# Patient Record
Sex: FEMALE | Race: WHITE | NOT HISPANIC OR LATINO | Employment: OTHER | URBAN - METROPOLITAN AREA
[De-identification: names, ages, dates, MRNs, and addresses within clinical notes are randomized per-mention and may not be internally consistent; named-entity substitution may affect disease eponyms.]

---

## 2017-08-16 ENCOUNTER — TRANSCRIBE ORDERS (OUTPATIENT)
Dept: BONE DENSITY | Facility: HOSPITAL | Age: 72
End: 2017-08-16

## 2017-08-16 DIAGNOSIS — Z78.0 MENOPAUSE: Primary | ICD-10-CM

## 2017-09-12 ENCOUNTER — HOSPITAL ENCOUNTER (OUTPATIENT)
Dept: BONE DENSITY | Facility: HOSPITAL | Age: 72
Discharge: HOME | End: 2017-09-12
Admitting: INTERNAL MEDICINE

## 2017-09-12 DIAGNOSIS — Z78.0 MENOPAUSE: ICD-10-CM

## 2017-09-12 PROCEDURE — 77080 DXA BONE DENSITY AXIAL: CPT

## 2017-09-22 ENCOUNTER — OFFICE VISIT (OUTPATIENT)
Dept: PULMONOLOGY | Facility: CLINIC | Age: 72
End: 2017-09-22

## 2017-09-22 VITALS
HEIGHT: 65 IN | WEIGHT: 136 LBS | BODY MASS INDEX: 22.66 KG/M2 | RESPIRATION RATE: 16 BRPM | DIASTOLIC BLOOD PRESSURE: 68 MMHG | OXYGEN SATURATION: 95 % | HEART RATE: 74 BPM | TEMPERATURE: 97.2 F | SYSTOLIC BLOOD PRESSURE: 104 MMHG

## 2017-09-22 DIAGNOSIS — J98.4 RESTRICTIVE LUNG DISEASE: Primary | ICD-10-CM

## 2017-09-22 PROCEDURE — 94375 RESPIRATORY FLOW VOLUME LOOP: CPT | Performed by: INTERNAL MEDICINE

## 2017-09-22 PROCEDURE — 99204 OFFICE O/P NEW MOD 45 MIN: CPT | Performed by: INTERNAL MEDICINE

## 2017-09-22 PROCEDURE — 94729 DIFFUSING CAPACITY: CPT | Performed by: INTERNAL MEDICINE

## 2017-09-22 PROCEDURE — 94726 PLETHYSMOGRAPHY LUNG VOLUMES: CPT | Performed by: INTERNAL MEDICINE

## 2017-09-22 RX ORDER — ERGOCALCIFEROL 1.25 MG/1
50000 CAPSULE ORAL DAILY
COMMUNITY
End: 2017-10-17

## 2017-09-22 RX ORDER — MULTIVIT-MIN/IRON/FOLIC ACID/K 18-600-40
1000 CAPSULE ORAL DAILY
COMMUNITY

## 2017-09-22 RX ORDER — LAMOTRIGINE 100 MG/1
50 TABLET ORAL DAILY
Refills: 0 | COMMUNITY
Start: 2017-09-15 | End: 2018-05-04

## 2017-09-22 RX ORDER — PANTOPRAZOLE SODIUM 40 MG/1
1 TABLET, DELAYED RELEASE ORAL DAILY
Refills: 0 | COMMUNITY
Start: 2017-08-09 | End: 2018-06-28 | Stop reason: SDUPTHER

## 2017-09-22 RX ORDER — CHLORAL HYDRATE 500 MG
1000 CAPSULE ORAL
COMMUNITY
End: 2023-01-06

## 2017-09-22 RX ORDER — FLUOXETINE 10 MG/1
1 CAPSULE ORAL DAILY
Refills: 0 | COMMUNITY
Start: 2017-08-30 | End: 2017-09-22 | Stop reason: SDUPTHER

## 2017-09-22 RX ORDER — DESLORATADINE 5 MG/1
5 TABLET ORAL DAILY
COMMUNITY
End: 2017-10-17

## 2017-09-22 NOTE — PROGRESS NOTES
Initial Pulmonary Consult Note    Subjective   Reason for consultation: Shortness of Breath    Maura Barry is a 72 y.o. female is being seen for consultation today at the request of Dr. Aleida Stein MD    History of Present Illness  Ms. Barry is a 71yo F who is referred for shortness of breath and an abnormal chest xray. She notes that she began feeling unwell back in July. She had a chest xray performed and there were changes in the left lower lobe. She was treated with antibiotics and had a repeat chest xray which continued to show the same changes. She does not have a chronic cough and she is able to do most of her ADLS. She mainly experiencing shortness of breath with exertion. She is a never smoker. Her symptoms have been stable to slightly worse over time.     Active Ambulatory Problems     Diagnosis Date Noted   • No Active Ambulatory Problems     Resolved Ambulatory Problems     Diagnosis Date Noted   • No Resolved Ambulatory Problems     Past Medical History:   Diagnosis Date   • Depression    • Depression        Past Surgical History:   Procedure Laterality Date   • BLADDER SUSPENSION         Family History   Problem Relation Age of Onset   • Stroke Mother    • Alzheimer's disease Father    • Asthma Brother    • Heart failure Brother    • Breast cancer Neg Hx        Social History     Social History   • Marital status:      Spouse name: N/A   • Number of children: N/A   • Years of education: N/A     Occupational History   • Not on file.     Social History Main Topics   • Smoking status: Never Smoker   • Smokeless tobacco: Never Used   • Alcohol use No   • Drug use: No   • Sexual activity: Defer     Other Topics Concern   • Not on file     Social History Narrative       Allergies   Allergen Reactions   • Penicillins        Current Outpatient Prescriptions   Medication Sig Dispense Refill   • ALLERGY SERUM INJECTION Inject  under the skin 1 (One) Time Per Week.     • Ascorbic Acid (VITAMIN C)  "500 MG capsule Take 100 mg by mouth Daily.     • aspirin 81 MG tablet Take 81 mg by mouth Daily.     • Calcium-Magnesium (CALCIUM MAGNESIUM 750) 300-300 MG tablet Take 3 tablets by mouth Daily.     • desloratadine (CLARINEX) 5 MG tablet Take 5 mg by mouth Daily.     • estradiol (ESTRACE) 1 MG tablet Take 0.5 tablets by mouth 2 (two) times a day.     • FLUoxetine (PROZAC) 20 MG capsule Take 30 mg by mouth Daily.     • Glucosamine HCl 1000 MG tablet Take 2,000 mg by mouth Daily.     • lamoTRIgine (LaMICtal) 100 MG tablet 1 tablet Daily.  0   • magnesium oxide (MAGOX) 400 (241.3 Mg) MG tablet tablet Take 400 mg by mouth Daily.     • memantine (NAMENDA) 10 MG tablet Take 10 mg by mouth daily.     • montelukast (SINGULAIR) 10 MG tablet Take 10 mg by mouth every night.     • Omega-3 Fatty Acids (FISH OIL) 1000 MG capsule capsule Take 1,000 mg by mouth Daily With Breakfast.     • pantoprazole (PROTONIX) 40 MG EC tablet 1 tablet Daily.  0   • traZODone (DESYREL) 50 MG tablet Take 50 mg by mouth every night.     • vitamin D (ERGOCALCIFEROL) 07330 units capsule capsule Take 50,000 Units by mouth Daily.       No current facility-administered medications for this visit.        Review of Systems  All other systems were reviewed and are negative.  Exceptions are included in the HPI or as otherwise noted above.     Objective   Blood pressure 104/68, pulse 74, temperature 97.2 °F (36.2 °C), resp. rate 16, height 65\" (165.1 cm), weight 136 lb (61.7 kg), SpO2 95 %.  Physical Exam   Constitutional: She is oriented to person, place, and time. She appears well-developed and well-nourished. No distress.   HENT:   Head: Normocephalic and atraumatic.   Mouth/Throat: Oropharynx is clear and moist.   Eyes: Conjunctivae are normal. Pupils are equal, round, and reactive to light. No scleral icterus.   Neck: Normal range of motion. Neck supple. No tracheal deviation present. No thyromegaly present.   Cardiovascular: Normal rate, regular rhythm " and normal heart sounds.    Pulmonary/Chest: Effort normal. No respiratory distress. She has no wheezes. She has rales in the right lower field and the left lower field.   Abdominal: Soft. Bowel sounds are normal. There is no tenderness.   Musculoskeletal: Normal range of motion. She exhibits no edema.   Lymphadenopathy:     She has no cervical adenopathy.   Neurological: She is alert and oriented to person, place, and time. She exhibits normal muscle tone. Coordination normal.   Skin: Skin is warm and dry. No rash noted. No erythema.   Psychiatric: She has a normal mood and affect. Her speech is normal and behavior is normal. Judgment normal.   Nursing note and vitals reviewed.      PFTs:  Performed in clinic and personally reviewed.   There is no obstruction.   There is mild restriction.   DLCO is reduced.     Imaging:  Outside imaging personally reviewed. Chest xrays from June and August 2017 show increased reticulation at the bases bilaterally. This is worse on the left.     Assessment/Plan   Maura was seen today for shortness of breath.    Diagnoses and all orders for this visit:    Restrictive lung disease  -     CT Chest Hi Resolution; Future  -     Pulmonary Function Test        Discussion:  Ms. Barry is a 73yo F who is referred for abnormal chest xray.     1. Restrictive Lung Disease  - PFTs show mild restriction and low DLCO. This raises the concern for an interstitial lung disease but could also be secondary to kyphosis from aging.   - Check a high resolution CT scan of the chest to assess for interstitial lung disease. If there is an NSIP pattern, plan to check ILD labs. If there is a UIP pattern, will need to discuss starting one of the newer medications for IPF.   - If CT scan is negative, can continue to monitor PFTs.   - I will call Ms. Barry with the results of her CT scan. She will follow up in clinic after that.          I personally spent over half of a total 45 minutes face to face with the  patient in counseling and discussion and/or coordination of care as described above.

## 2017-09-27 ENCOUNTER — TRANSCRIBE ORDERS (OUTPATIENT)
Dept: ADMINISTRATIVE | Facility: HOSPITAL | Age: 72
End: 2017-09-27

## 2017-09-27 DIAGNOSIS — Z12.31 VISIT FOR SCREENING MAMMOGRAM: Primary | ICD-10-CM

## 2017-09-28 ENCOUNTER — HOSPITAL ENCOUNTER (OUTPATIENT)
Dept: CT IMAGING | Facility: HOSPITAL | Age: 72
Discharge: HOME | End: 2017-09-28
Attending: INTERNAL MEDICINE | Admitting: INTERNAL MEDICINE

## 2017-09-28 DIAGNOSIS — J98.4 RESTRICTIVE LUNG DISEASE: ICD-10-CM

## 2017-09-28 PROCEDURE — 71250 CT THORAX DX C-: CPT

## 2017-10-12 ENCOUNTER — TELEPHONE (OUTPATIENT)
Dept: PULMONOLOGY | Facility: CLINIC | Age: 72
End: 2017-10-12

## 2017-10-12 NOTE — TELEPHONE ENCOUNTER
Ms Barry called for her CT results, which I reviewed with her.     She also mentioned the possibility of having a sleep study performed. She is followed by a neurologist for memory problems and states that her neurologist recommended a sleep study. Her neurologist is located at [facility] and she would rather have the study performed at [facility]. I explained the need for documentation from a face-to-face visit in order for insurance to approve a study. She is agreeable and will come in to see me next week.

## 2017-10-17 ENCOUNTER — OFFICE VISIT (OUTPATIENT)
Dept: PULMONOLOGY | Facility: CLINIC | Age: 72
End: 2017-10-17

## 2017-10-17 VITALS
SYSTOLIC BLOOD PRESSURE: 100 MMHG | HEART RATE: 80 BPM | WEIGHT: 137.8 LBS | OXYGEN SATURATION: 94 % | HEIGHT: 65 IN | RESPIRATION RATE: 16 BRPM | TEMPERATURE: 96.9 F | DIASTOLIC BLOOD PRESSURE: 60 MMHG | BODY MASS INDEX: 22.96 KG/M2

## 2017-10-17 DIAGNOSIS — R29.818 SUSPECTED SLEEP APNEA: Primary | ICD-10-CM

## 2017-10-17 DIAGNOSIS — R41.3 MEMORY DIFFICULTIES: ICD-10-CM

## 2017-10-17 PROCEDURE — 99212 OFFICE O/P EST SF 10 MIN: CPT | Performed by: NURSE PRACTITIONER

## 2017-10-17 RX ORDER — INFLUENZA VACCINE, ADJUVANTED 15; 15; 15 UG/.5ML; UG/.5ML; UG/.5ML
INJECTION, SUSPENSION INTRAMUSCULAR ONCE
Refills: 0 | COMMUNITY
Start: 2017-09-22 | End: 2020-05-18

## 2017-10-17 RX ORDER — FLUTICASONE PROPIONATE 50 MCG
SPRAY, SUSPENSION (ML) NASAL DAILY
Refills: 1 | COMMUNITY
Start: 2017-07-26 | End: 2020-01-22 | Stop reason: SDUPTHER

## 2017-10-17 NOTE — PROGRESS NOTES
"[facility] Pulmonary Follow up    CHIEF COMPLAINT    Possible sleep apnea    HISTORY OF PRESENT ILLNESS    Maura Barry is a 72 y.o.female here today to discuss testing for sleep apnea    She was first evaluated in our office by Dr. Stevenson on 9/22/17 for shortness of breath and an abnormal chest x-ray.  She had a CT of the chest and contacted me for results.  During that call she mentioned the possibility of sleep apnea and returns today to discuss testing.    She has been seen by neurologist for almost 2 years for memory problems.  She states that her neurologist mentioned being tested for sleep apnea however he is located at [place] and she would like to have the study performed at [facility].      She reports shortness of breath and EXCESSIVE daytime sleepiness.  She denies having to nap but states that she does have to rest throughout the day.  She typically sleeps alone but traveled with friends earlier this year.  On 2 separate occasions she was told by 2 separate friends that she stopped breathing in her sleep.    She did have a sleep study performed about 10 years ago but by her report it was \"fine\".  She takes trazodone 25 mg daily at bedtime but states that she has been on this for about 5 years.      There is no problem list on file for this patient.      Allergies   Allergen Reactions   • Penicillins        Current Outpatient Prescriptions:   •  ALLERGY SERUM INJECTION, Inject  under the skin 1 (One) Time Per Week., Disp: , Rfl:   •  Ascorbic Acid (VITAMIN C) 500 MG capsule, Take 1,000 mg by mouth Daily., Disp: , Rfl:   •  aspirin 81 MG tablet, Take 81 mg by mouth Daily., Disp: , Rfl:   •  Calcium-Magnesium (CALCIUM MAGNESIUM 750) 300-300 MG tablet, Take 3 tablets by mouth Daily., Disp: , Rfl:   •  Cholecalciferol (VITAMIN D3) 5000 units capsule capsule, Take 5,000 Units by mouth Daily., Disp: , Rfl:   •  FLUAD 0.5 ML suspension prefilled syringe, 1 (One) Time., Disp: , Rfl: 0  •  FLUoxetine (PROZAC) 20 " "MG capsule, Take 30 mg by mouth Daily., Disp: , Rfl:   •  fluticasone (FLONASE) 50 MCG/ACT nasal spray, Daily., Disp: , Rfl: 1  •  Glucosamine HCl 1000 MG tablet, Take 2,000 mg by mouth Daily., Disp: , Rfl:   •  lamoTRIgine (LaMICtal) 100 MG tablet, 50 mg Daily., Disp: , Rfl: 0  •  magnesium oxide (MAGOX) 400 (241.3 Mg) MG tablet tablet, Take 400 mg by mouth Daily., Disp: , Rfl:   •  memantine (NAMENDA) 10 MG tablet, Take 10 mg by mouth 2 (Two) Times a Day., Disp: , Rfl:   •  montelukast (SINGULAIR) 10 MG tablet, Take 10 mg by mouth every night., Disp: , Rfl:   •  Omega-3 Fatty Acids (FISH OIL) 1000 MG capsule capsule, Take 1,000 mg by mouth Daily With Breakfast., Disp: , Rfl:   •  pantoprazole (PROTONIX) 40 MG EC tablet, 1 tablet Daily., Disp: , Rfl: 0  •  traZODone (DESYREL) 50 MG tablet, Take 25 mg by mouth Every Night., Disp: , Rfl:   MEDICATION LIST AND ALLERGIES REVIEWED.    Social History   Substance Use Topics   • Smoking status: Never Smoker   • Smokeless tobacco: Never Used   • Alcohol use No       FAMILY AND SOCIAL HISTORY REVIEWED.    Review of Systems   Constitutional: Negative for activity change and fatigue.   Respiratory: Positive for shortness of breath.    Cardiovascular: Negative for chest pain and palpitations.   Neurological:        Memory problems   .    /60  Pulse 80  Temp 96.9 °F (36.1 °C)  Resp 16  Ht 65\" (165.1 cm)  Wt 137 lb 12.8 oz (62.5 kg)  SpO2 94% Comment: RA  BMI 22.93 kg/m2    Physical Exam   Constitutional: She is oriented to person, place, and time. She appears well-developed. No distress.   HENT:   Head: Normocephalic and atraumatic.   Neck: Neck supple.   Pulmonary/Chest: Effort normal. No stridor. No respiratory distress.   Musculoskeletal: She exhibits no edema.   Neurological: She is alert and oriented to person, place, and time.   Skin: Skin is warm and dry.   Psychiatric: She has a normal mood and affect. Her behavior is normal.   Vitals " reviewed.        RESULTS      PROBLEM LIST    Problem List Items Addressed This Visit     None      Visit Diagnoses     Suspected sleep apnea    -  Primary    Relevant Orders    Polysomnography 4 or More Parameters    Memory difficulties        Relevant Orders    Polysomnography 4 or More Parameters            DISCUSSION    She does have some symptoms of sleep apnea including witnessed apneas, daytime somnolence, and memory problems.  We will set her up for a sleep study in the sleep lab.  She is agreeable to CPAP or BiPAP therapy if warranted.  I will follow-up with her after her sleep study.    Marleny Alvarez, REGGIE  10/17/23010:44 PM  Electronically signed     Please note that portions of this note were completed with a voice recognition program. Efforts were made to edit the dictations, but occasionally words are mistranscribed.      CC: Aleida Stein MD

## 2017-10-23 ENCOUNTER — HOSPITAL ENCOUNTER (OUTPATIENT)
Dept: SLEEP MEDICINE | Facility: HOSPITAL | Age: 72
Discharge: HOME | End: 2017-10-23
Admitting: NURSE PRACTITIONER

## 2017-10-23 VITALS
BODY MASS INDEX: 22.82 KG/M2 | WEIGHT: 137 LBS | HEIGHT: 65 IN | SYSTOLIC BLOOD PRESSURE: 118 MMHG | RESPIRATION RATE: 16 BRPM | DIASTOLIC BLOOD PRESSURE: 55 MMHG

## 2017-10-23 DIAGNOSIS — R29.818 SUSPECTED SLEEP APNEA: ICD-10-CM

## 2017-10-23 DIAGNOSIS — R41.3 MEMORY DIFFICULTIES: ICD-10-CM

## 2017-10-23 PROCEDURE — 95800 SLP STDY UNATTENDED: CPT

## 2017-10-23 PROCEDURE — 95800 SLP STDY UNATTENDED: CPT | Performed by: INTERNAL MEDICINE

## 2017-11-13 ENCOUNTER — HOSPITAL ENCOUNTER (OUTPATIENT)
Dept: MAMMOGRAPHY | Facility: HOSPITAL | Age: 72
Discharge: HOME | End: 2017-11-13
Attending: OBSTETRICS & GYNECOLOGY | Admitting: OBSTETRICS & GYNECOLOGY

## 2017-11-13 DIAGNOSIS — Z12.31 VISIT FOR SCREENING MAMMOGRAM: ICD-10-CM

## 2017-11-13 PROCEDURE — 77063 BREAST TOMOSYNTHESIS BI: CPT

## 2017-11-13 PROCEDURE — G0202 SCR MAMMO BI INCL CAD: HCPCS | Performed by: RADIOLOGY

## 2017-11-13 PROCEDURE — 77063 BREAST TOMOSYNTHESIS BI: CPT | Performed by: RADIOLOGY

## 2017-11-13 PROCEDURE — G0202 SCR MAMMO BI INCL CAD: HCPCS

## 2018-01-08 ENCOUNTER — OFFICE VISIT (OUTPATIENT)
Dept: PULMONOLOGY | Facility: CLINIC | Age: 73
End: 2018-01-08

## 2018-01-08 VITALS
HEIGHT: 65 IN | OXYGEN SATURATION: 94 % | SYSTOLIC BLOOD PRESSURE: 120 MMHG | DIASTOLIC BLOOD PRESSURE: 72 MMHG | BODY MASS INDEX: 22.99 KG/M2 | TEMPERATURE: 98.7 F | WEIGHT: 138 LBS | HEART RATE: 84 BPM

## 2018-01-08 DIAGNOSIS — Z99.89 OSA ON CPAP: Primary | ICD-10-CM

## 2018-01-08 DIAGNOSIS — G47.33 OSA ON CPAP: Primary | ICD-10-CM

## 2018-01-08 PROCEDURE — 99213 OFFICE O/P EST LOW 20 MIN: CPT | Performed by: NURSE PRACTITIONER

## 2018-01-08 RX ORDER — RANITIDINE 300 MG/1
TABLET ORAL
Refills: 0 | COMMUNITY
Start: 2017-11-13 | End: 2018-07-26 | Stop reason: SDUPTHER

## 2018-01-08 NOTE — PROGRESS NOTES
[facility] Pulmonary Follow up    CHIEF COMPLAINT    KRISTY    HISTORY OF PRESENT ILLNESS    Maura Barry is a 72 y.o.female here today for follow up on her sleep apnea.    She was initially seen by Dr Stevenson for evaluation of an abnormal chest xray as well as some dyspnea on exertion. She had a HRCT that showed limited basilar bronchiectasis. When she contacted me for her CT results, she mentioned the possibility of a sleep study as this had been suggested to her by her neurologist for numerous symptoms of sleep apnea.    She had a home sleep study on 10/27/17 which revealed an AHI of 7.2. Auto CPAP 8-18 cm was initiated. She tries to wear it each night for at least 7 hours. She has downloaded the accompanying amandeep for her CPAP to her phone and was quite excited to show me all of her numbers. I did not see an overall AHI for the last 30 days but it appears her nightly AHI is 4 or less. She has noticed an improvement with more energy and less fatigue. She still has some shortness of breath when walking up hill and has not noticed an improvement in her memory problems.     She does have an upcoming trip to [facility] planned. She does not intend to take her CPAP but is concerned about being reported non-compliant to her insurance company during her 2 week trip.         Patient Active Problem List   Diagnosis   • KRISTY on CPAP       Allergies   Allergen Reactions   • Penicillins Hives       Current Outpatient Prescriptions:   •  ALLERGY SERUM INJECTION, Inject  under the skin 1 (One) Time Per Week., Disp: , Rfl:   •  Ascorbic Acid (VITAMIN C) 500 MG capsule, Take 1,000 mg by mouth Daily., Disp: , Rfl:   •  aspirin 81 MG tablet, Take 81 mg by mouth Daily., Disp: , Rfl:   •  Calcium-Magnesium (CALCIUM MAGNESIUM 750) 300-300 MG tablet, Take 3 tablets by mouth Daily., Disp: , Rfl:   •  Cholecalciferol (VITAMIN D3) 5000 units capsule capsule, Take 5,000 Units by mouth Daily., Disp: , Rfl:   •  FLUAD 0.5 ML suspension prefilled  "syringe, 1 (One) Time., Disp: , Rfl: 0  •  FLUoxetine (PROZAC) 20 MG capsule, Take 40 mg by mouth Daily., Disp: , Rfl:   •  fluticasone (FLONASE) 50 MCG/ACT nasal spray, Daily., Disp: , Rfl: 1  •  Glucosamine HCl 1000 MG tablet, Take 2,000 mg by mouth Daily., Disp: , Rfl:   •  lamoTRIgine (LaMICtal) 100 MG tablet, 50 mg Daily., Disp: , Rfl: 0  •  magnesium oxide (MAGOX) 400 (241.3 Mg) MG tablet tablet, Take 400 mg by mouth Daily., Disp: , Rfl:   •  memantine (NAMENDA) 10 MG tablet, Take 10 mg by mouth 2 (Two) Times a Day., Disp: , Rfl:   •  montelukast (SINGULAIR) 10 MG tablet, Take 10 mg by mouth every night., Disp: , Rfl:   •  Omega-3 Fatty Acids (FISH OIL) 1000 MG capsule capsule, Take 1,000 mg by mouth Daily With Breakfast., Disp: , Rfl:   •  pantoprazole (PROTONIX) 40 MG EC tablet, 1 tablet Daily., Disp: , Rfl: 0  •  raNITIdine (ZANTAC) 300 MG tablet, , Disp: , Rfl: 0  •  traZODone (DESYREL) 50 MG tablet, Take 25 mg by mouth Every Night., Disp: , Rfl:   MEDICATION LIST AND ALLERGIES REVIEWED.    Social History   Substance Use Topics   • Smoking status: Never Smoker   • Smokeless tobacco: Never Used   • Alcohol use No       FAMILY AND SOCIAL HISTORY REVIEWED.    Review of Systems   Constitutional: Negative for activity change and fatigue.   Respiratory: Positive for shortness of breath (on exertion).    Cardiovascular: Negative for chest pain and palpitations.   Neurological:        Memory problems   .    /72 (BP Location: Right arm, Patient Position: Sitting, Cuff Size: Adult)  Pulse 84  Temp 98.7 °F (37.1 °C)  Ht 165.1 cm (65\")  Wt 62.6 kg (138 lb)  SpO2 94%  BMI 22.96 kg/m2    Physical Exam   Constitutional: She is oriented to person, place, and time. She appears well-developed. No distress.   HENT:   Head: Normocephalic and atraumatic.   Neck: Neck supple.   Pulmonary/Chest: Effort normal. No stridor. No respiratory distress.   Musculoskeletal: She exhibits no edema.   Neurological: She is alert " and oriented to person, place, and time.   Skin: Skin is warm and dry.   Psychiatric: She has a normal mood and affect. Her behavior is normal.   Vitals reviewed.        RESULTS      Ct Chest Hi Resolution    Result Date: 9/28/2017  1.  Limited basilar bronchiectasis is noted. Trace scarring of the posterior inferior perimeter interstitium is noted. 2.  Otherwise, there is no groundglass disease, advanced parenchymal degenerative disease, bullous disease or pleural disease. Secondary perimeter, secondary pulmonary lobular septa are normal. Therefore, advanced fibrotic lung disease or significant interstitial global abnormalities are not identified.  D:  09/28/2017 E:  09/28/2017  This report was finalized on 9/28/2017 4:22 PM by Dr. Jairo Alvarez MD.        PROBLEM LIST    Problem List Items Addressed This Visit        Respiratory    KRISTY on CPAP - Primary    Overview     Home sleep study from 10/27/17 reveals mild KRISTY with AHI 7.2                 DISCUSSION    She has been quite compliant with her CPAP with a noticeable difference in her energy levels. We'll renew her CPAP supplies for 1 year.    I've recommended that she contact her insurance company regarding her upcoming trip to [facility] and ask about their compliance policy. Perhaps if she notifies them ahead of time, it will not be an issue.     She'll return to the office in 1 year or sooner if needed.     I spent 15 minutes with the patient. I spent > 50% percent of this time counseling and discussing diagnosis, current status and treatment options.    REGGIE Crocker  01/08/20181:58 PM  Electronically signed     Please note that portions of this note were completed with a voice recognition program. Efforts were made to edit the dictations, but occasionally words are mistranscribed.      CC: Aleida Stein MD

## 2018-05-04 ENCOUNTER — OFFICE VISIT (OUTPATIENT)
Dept: INTERNAL MEDICINE | Facility: CLINIC | Age: 73
End: 2018-05-04

## 2018-05-04 VITALS
HEIGHT: 65 IN | OXYGEN SATURATION: 95 % | SYSTOLIC BLOOD PRESSURE: 98 MMHG | HEART RATE: 86 BPM | WEIGHT: 126 LBS | DIASTOLIC BLOOD PRESSURE: 60 MMHG | BODY MASS INDEX: 20.99 KG/M2

## 2018-05-04 DIAGNOSIS — D64.9 ANEMIA, UNSPECIFIED TYPE: ICD-10-CM

## 2018-05-04 DIAGNOSIS — J30.89 ALLERGIC RHINITIS DUE TO OTHER ALLERGIC TRIGGER, UNSPECIFIED CHRONICITY, UNSPECIFIED SEASONALITY: ICD-10-CM

## 2018-05-04 DIAGNOSIS — F34.9 PERSISTENT MOOD DISORDER (HCC): ICD-10-CM

## 2018-05-04 DIAGNOSIS — R42 DIZZINESS: Primary | ICD-10-CM

## 2018-05-04 PROCEDURE — 99203 OFFICE O/P NEW LOW 30 MIN: CPT | Performed by: INTERNAL MEDICINE

## 2018-05-04 NOTE — PROGRESS NOTES
Establish Care; Fatigue (Onset for many years ); and Nausea (Started after she came back from [place]. Pt has felt loss of appetitte because of the nausea.)    Subjective   Maura Barry is a 72 y.o. female is here today for follow-up.    History of Present Illness   Kunal and Shesenthil's friend. Pt. Is here to establish, and has been with Dr. Stein here and at [facility], but this office is more convenient.Reports she is light headed , dizzy , nauseous.  Also has palps and weakness, and  Feels like she needs to sleep all the time.  Depression is getting worse, and is followed by [facility], and is having issues with being jittery.  Had labs when she got back from [place], and was having diarrhea, and had labs and meds , resolved now.  SHe reports was healthy , until she was taken off her estrogen, 10 yrs ago, getting and was taken off again 2 yrs ago.      Current Outpatient Prescriptions:   •  ALLERGY SERUM INJECTION, Inject  under the skin 1 (One) Time Per Week., Disp: , Rfl:   •  Ascorbic Acid (VITAMIN C) 500 MG capsule, Take 1,000 mg by mouth Daily., Disp: , Rfl:   •  aspirin 81 MG tablet, Take 81 mg by mouth Daily., Disp: , Rfl:   •  Calcium-Magnesium (CALCIUM MAGNESIUM 750) 300-300 MG tablet, Take 3 tablets by mouth Daily., Disp: , Rfl:   •  Cholecalciferol (VITAMIN D3) 5000 units capsule capsule, Take 5,000 Units by mouth Daily., Disp: , Rfl:   •  FLUAD 0.5 ML suspension prefilled syringe, 1 (One) Time., Disp: , Rfl: 0  •  FLUoxetine (PROZAC) 20 MG capsule, Take 40 mg by mouth Daily., Disp: , Rfl:   •  fluticasone (FLONASE) 50 MCG/ACT nasal spray, Daily., Disp: , Rfl: 1  •  Glucosamine HCl 1000 MG tablet, Take 2,000 mg by mouth Daily., Disp: , Rfl:   •  magnesium oxide (MAGOX) 400 (241.3 Mg) MG tablet tablet, Take 400 mg by mouth Daily., Disp: , Rfl:   •  memantine (NAMENDA) 10 MG tablet, Take 10 mg by mouth 2 (Two) Times a Day., Disp: , Rfl:   •  Omega-3 Fatty Acids (FISH OIL) 1000 MG capsule capsule, Take  "1,000 mg by mouth Daily With Breakfast., Disp: , Rfl:   •  pantoprazole (PROTONIX) 40 MG EC tablet, 1 tablet Daily., Disp: , Rfl: 0  •  raNITIdine (ZANTAC) 300 MG tablet, , Disp: , Rfl: 0  •  traZODone (DESYREL) 50 MG tablet, Take 25 mg by mouth Every Night., Disp: , Rfl:       The following portions of the patient's history were reviewed and updated as appropriate: allergies, current medications, past family history, past medical history, past social history, past surgical history and problem list.    Review of Systems   Constitutional: Negative for chills and fever.   HENT: Negative for ear discharge, ear pain, sinus pressure and sore throat.    Respiratory: Negative for cough, chest tightness and shortness of breath.    Cardiovascular: Negative for chest pain, palpitations and leg swelling.   Gastrointestinal: Positive for nausea. Negative for diarrhea and vomiting.   Musculoskeletal: Negative for arthralgias, back pain and myalgias.   Neurological: Positive for dizziness, weakness and light-headedness. Negative for syncope and headaches.   Psychiatric/Behavioral: Positive for sleep disturbance. Negative for confusion.       Objective   BP 98/60   Pulse 86   Ht 165.1 cm (65\")   Wt 57.2 kg (126 lb)   SpO2 95%   BMI 20.97 kg/m²   Physical Exam   Constitutional: She is oriented to person, place, and time. She appears well-developed and well-nourished.   HENT:   Head: Normocephalic and atraumatic.   Right Ear: Tympanic membrane is bulging.   Left Ear: Tympanic membrane is bulging.   Mouth/Throat: Posterior oropharyngeal erythema present. No oropharyngeal exudate or tonsillar abscesses.   Eyes: Conjunctivae are normal. Pupils are equal, round, and reactive to light.   Neck: Normal range of motion. Neck supple. No thyromegaly present.   Cardiovascular: Normal rate and regular rhythm.    Pulmonary/Chest: Effort normal and breath sounds normal. No stridor.   Abdominal: Soft. Bowel sounds are normal. She exhibits no " distension. There is no tenderness.   Musculoskeletal: She exhibits no edema.   Lymphadenopathy:     She has no cervical adenopathy.   Neurological: She is alert and oriented to person, place, and time. No cranial nerve deficit.   Skin: Skin is warm and dry.   Psychiatric: Judgment normal. Her mood appears anxious. She exhibits a depressed mood.   Nursing note and vitals reviewed.        No results found for this or any previous visit.          Assessment/Plan   Diagnoses and all orders for this visit:    Dizziness  -     CBC & Differential  -     Comprehensive Metabolic Panel  -     TSH  -     Vitamin B12  -     KEEGAN With / DsDNA, RNP, Sjogrens A / B, Crump    Allergic rhinitis due to other allergic trigger, unspecified chronicity, unspecified seasonality    Anemia, unspecified type  -     CBC & Differential  -     KEEGAN With / DsDNA, RNP, Sjogrens A / B, Smith  -     Nuclear Antigen Antibody, IFA  -     Ferritin  -     Iron Profile    Persistent mood disorder   -     TSH             Adv. To hold off on going back on Estrogen for now. She has been able to come off it twice. Would try other modalities first.    Return in about 6 weeks (around 6/15/2018) for Next scheduled follow up.

## 2018-05-07 LAB
ALBUMIN SERPL-MCNC: 4.3 G/DL (ref 3.2–4.8)
ALBUMIN/GLOB SERPL: 1.7 G/DL (ref 1.5–2.5)
ALP SERPL-CCNC: 130 U/L (ref 25–100)
ALT SERPL-CCNC: 29 U/L (ref 7–40)
ANA SER QL: NEGATIVE
ANA TITR SER IF: NEGATIVE {TITER}
AST SERPL-CCNC: 30 U/L (ref 0–33)
BASOPHILS # BLD AUTO: 0.06 10*3/MM3 (ref 0–0.2)
BASOPHILS NFR BLD AUTO: 0.6 % (ref 0–1)
BILIRUB SERPL-MCNC: 0.3 MG/DL (ref 0.3–1.2)
BUN SERPL-MCNC: 20 MG/DL (ref 9–23)
BUN/CREAT SERPL: 25 (ref 7–25)
CALCIUM SERPL-MCNC: 9.6 MG/DL (ref 8.7–10.4)
CHLORIDE SERPL-SCNC: 106 MMOL/L (ref 99–109)
CO2 SERPL-SCNC: 33 MMOL/L (ref 20–31)
CREAT SERPL-MCNC: 0.8 MG/DL (ref 0.6–1.3)
EOSINOPHIL # BLD AUTO: 0.58 10*3/MM3 (ref 0–0.3)
EOSINOPHIL NFR BLD AUTO: 5.9 % (ref 0–3)
ERYTHROCYTE [DISTWIDTH] IN BLOOD BY AUTOMATED COUNT: 14.7 % (ref 11.3–14.5)
FERRITIN SERPL-MCNC: 31 NG/ML (ref 10–291)
GFR SERPLBLD CREATININE-BSD FMLA CKD-EPI: 71 ML/MIN/1.73
GFR SERPLBLD CREATININE-BSD FMLA CKD-EPI: 86 ML/MIN/1.73
GLOBULIN SER CALC-MCNC: 2.5 GM/DL
GLUCOSE SERPL-MCNC: 82 MG/DL (ref 70–100)
HCT VFR BLD AUTO: 43.7 % (ref 34.5–44)
HGB BLD-MCNC: 14 G/DL (ref 11.5–15.5)
IMM GRANULOCYTES # BLD: 0.02 10*3/MM3 (ref 0–0.03)
IMM GRANULOCYTES NFR BLD: 0.2 % (ref 0–0.6)
IRON SATN MFR SERPL: 18 % (ref 15–50)
IRON SERPL-MCNC: 60 MCG/DL (ref 50–175)
LYMPHOCYTES # BLD AUTO: 1.47 10*3/MM3 (ref 0.6–4.8)
LYMPHOCYTES NFR BLD AUTO: 15 % (ref 24–44)
MCH RBC QN AUTO: 31.3 PG (ref 27–31)
MCHC RBC AUTO-ENTMCNC: 32 G/DL (ref 32–36)
MCV RBC AUTO: 97.5 FL (ref 80–99)
MONOCYTES # BLD AUTO: 0.75 10*3/MM3 (ref 0–1)
MONOCYTES NFR BLD AUTO: 7.7 % (ref 0–12)
NEUTROPHILS # BLD AUTO: 6.92 10*3/MM3 (ref 1.5–8.3)
NEUTROPHILS NFR BLD AUTO: 70.6 % (ref 41–71)
PLATELET # BLD AUTO: 337 10*3/MM3 (ref 150–450)
POTASSIUM SERPL-SCNC: 4.8 MMOL/L (ref 3.5–5.5)
PROT SERPL-MCNC: 6.8 G/DL (ref 5.7–8.2)
RBC # BLD AUTO: 4.48 10*6/MM3 (ref 3.89–5.14)
SODIUM SERPL-SCNC: 142 MMOL/L (ref 132–146)
TIBC SERPL-MCNC: 340 MCG/DL (ref 250–450)
TSH SERPL DL<=0.005 MIU/L-ACNC: 1.63 MIU/ML (ref 0.35–5.35)
UIBC SERPL-MCNC: 280 MCG/DL
VIT B12 SERPL-MCNC: 503 PG/ML (ref 211–911)
WBC # BLD AUTO: 9.8 10*3/MM3 (ref 3.5–10.8)

## 2018-06-15 ENCOUNTER — OFFICE VISIT (OUTPATIENT)
Dept: INTERNAL MEDICINE | Facility: CLINIC | Age: 73
End: 2018-06-15

## 2018-06-15 VITALS
HEIGHT: 65 IN | BODY MASS INDEX: 20.99 KG/M2 | SYSTOLIC BLOOD PRESSURE: 124 MMHG | HEART RATE: 87 BPM | WEIGHT: 126 LBS | OXYGEN SATURATION: 97 % | DIASTOLIC BLOOD PRESSURE: 70 MMHG

## 2018-06-15 DIAGNOSIS — E61.1 IRON DEFICIENCY: ICD-10-CM

## 2018-06-15 DIAGNOSIS — E53.8 B12 DEFICIENCY: Primary | ICD-10-CM

## 2018-06-15 DIAGNOSIS — E55.9 VITAMIN D DEFICIENCY: ICD-10-CM

## 2018-06-15 DIAGNOSIS — E78.5 DYSLIPIDEMIA: ICD-10-CM

## 2018-06-15 DIAGNOSIS — R53.83 OTHER FATIGUE: ICD-10-CM

## 2018-06-15 PROCEDURE — 99213 OFFICE O/P EST LOW 20 MIN: CPT | Performed by: INTERNAL MEDICINE

## 2018-06-15 RX ORDER — FLUOXETINE HYDROCHLORIDE 60 MG/1
60 TABLET, FILM COATED ORAL; ORAL DAILY
COMMUNITY
End: 2019-09-18

## 2018-06-15 RX ORDER — FERROUS SULFATE 325(65) MG
325 TABLET ORAL
Qty: 30 TABLET | Refills: 5 | Status: SHIPPED | OUTPATIENT
Start: 2018-06-15 | End: 2019-01-01 | Stop reason: SDUPTHER

## 2018-06-15 NOTE — PROGRESS NOTES
Dizziness (6 wk follow up)    Subjective   Maura Barry is a 72 y.o. female is here today for follow-up.    History of Present Illness   She is here for follow up on her dizziness, which she reports is much better, than before. Reports she has been drinking a lot of fluids.  Would like to review labs , that she had last visit.    Current Outpatient Prescriptions:   •  ALLERGY SERUM INJECTION, Inject  under the skin 1 (One) Time Per Week., Disp: , Rfl:   •  Ascorbic Acid (VITAMIN C) 500 MG capsule, Take 1,000 mg by mouth Daily., Disp: , Rfl:   •  aspirin 81 MG tablet, Take 81 mg by mouth Daily., Disp: , Rfl:   •  Calcium-Magnesium (CALCIUM MAGNESIUM 750) 300-300 MG tablet, Take 3 tablets by mouth Daily., Disp: , Rfl:   •  Cholecalciferol (VITAMIN D3) 5000 units capsule capsule, Take 5,000 Units by mouth Daily., Disp: , Rfl:   •  FLUAD 0.5 ML suspension prefilled syringe, 1 (One) Time., Disp: , Rfl: 0  •  FLUoxetine (PROzac) 60 MG tablet, Take 60 mg by mouth Daily., Disp: , Rfl:   •  fluticasone (FLONASE) 50 MCG/ACT nasal spray, Daily., Disp: , Rfl: 1  •  Glucosamine HCl 1000 MG tablet, Take 2,000 mg by mouth Daily., Disp: , Rfl:   •  magnesium oxide (MAGOX) 400 (241.3 Mg) MG tablet tablet, Take 400 mg by mouth Daily., Disp: , Rfl:   •  memantine (NAMENDA) 10 MG tablet, Take 10 mg by mouth 2 (Two) Times a Day., Disp: , Rfl:   •  Omega-3 Fatty Acids (FISH OIL) 1000 MG capsule capsule, Take 1,000 mg by mouth Daily With Breakfast., Disp: , Rfl:   •  pantoprazole (PROTONIX) 40 MG EC tablet, 1 tablet Daily., Disp: , Rfl: 0  •  raNITIdine (ZANTAC) 300 MG tablet, , Disp: , Rfl: 0  •  traZODone (DESYREL) 50 MG tablet, Take 25 mg by mouth Every Night., Disp: , Rfl:   •  Cyanocobalamin 1000 MCG sublingual tablet, Place 1 tablet under the tongue Daily., Disp: 30 each, Rfl: 5  •  ferrous sulfate 325 (65 FE) MG tablet, Take 1 tablet by mouth Daily. With food, Disp: 30 tablet, Rfl: 5      The following portions of the patient's  "history were reviewed and updated as appropriate: allergies, current medications, past family history, past medical history, past social history, past surgical history and problem list.    Review of Systems   Constitutional: Positive for fatigue. Negative for chills and fever.   HENT: Negative for ear discharge, ear pain, sinus pressure and sore throat.    Respiratory: Negative for cough, chest tightness and shortness of breath.    Cardiovascular: Negative for chest pain, palpitations and leg swelling.   Gastrointestinal: Negative for diarrhea, nausea and vomiting.   Musculoskeletal: Negative for arthralgias, back pain and myalgias.   Neurological: Positive for dizziness. Negative for syncope and headaches.   Psychiatric/Behavioral: Negative for confusion and sleep disturbance.       Objective   /70   Pulse 87   Ht 165.1 cm (65\")   Wt 57.2 kg (126 lb)   SpO2 97%   BMI 20.97 kg/m²   Physical Exam   Constitutional: She is oriented to person, place, and time. She appears well-developed and well-nourished.   HENT:   Head: Normocephalic and atraumatic.   Right Ear: Tympanic membrane is bulging.   Left Ear: Tympanic membrane is bulging.   Mouth/Throat: Posterior oropharyngeal erythema present. No oropharyngeal exudate or tonsillar abscesses.   Eyes: Conjunctivae are normal. Pupils are equal, round, and reactive to light.   Neck: Normal range of motion. Neck supple. No thyromegaly present.   Cardiovascular: Normal rate and regular rhythm.    Pulmonary/Chest: Effort normal and breath sounds normal. No stridor.   Abdominal: Soft. Bowel sounds are normal. She exhibits no distension. There is no tenderness.   Musculoskeletal: She exhibits no edema.   Lymphadenopathy:     She has no cervical adenopathy.   Neurological: She is alert and oriented to person, place, and time. No cranial nerve deficit.   Skin: Skin is warm and dry.   Psychiatric: Judgment normal. Her mood appears anxious. She exhibits a depressed mood. "   Nursing note and vitals reviewed.        Results for orders placed or performed in visit on 05/04/18   Comprehensive Metabolic Panel   Result Value Ref Range    Glucose 82 70 - 100 mg/dL    BUN 20 9 - 23 mg/dL    Creatinine 0.80 0.60 - 1.30 mg/dL    eGFR Non African Am 71 >60 mL/min/1.73    eGFR African Am 86 >60 mL/min/1.73    BUN/Creatinine Ratio 25.0 7.0 - 25.0    Sodium 142 132 - 146 mmol/L    Potassium 4.8 3.5 - 5.5 mmol/L    Chloride 106 99 - 109 mmol/L    Total CO2 33.0 (H) 20.0 - 31.0 mmol/L    Calcium 9.6 8.7 - 10.4 mg/dL    Total Protein 6.8 5.7 - 8.2 g/dL    Albumin 4.30 3.20 - 4.80 g/dL    Globulin 2.5 gm/dL    A/G Ratio 1.7 1.5 - 2.5 g/dL    Total Bilirubin 0.3 0.3 - 1.2 mg/dL    Alkaline Phosphatase 130 (H) 25 - 100 U/L    AST (SGOT) 30 0 - 33 U/L    ALT (SGPT) 29 7 - 40 U/L   TSH   Result Value Ref Range    TSH 1.630 0.350 - 5.350 mIU/mL   Vitamin B12   Result Value Ref Range    Vitamin B-12 503 211 - 911 pg/mL   KEEGAN With / DsDNA, RNP, Sjogrens A / B, Crump   Result Value Ref Range    KEEGAN Direct Negative Negative   Nuclear Antigen Antibody, IFA   Result Value Ref Range    KEEGAN Negative    Ferritin   Result Value Ref Range    Ferritin 31.00 10.00 - 291.00 ng/mL   Iron Profile   Result Value Ref Range    TIBC 340 250 - 450 mcg/dL    UIBC 280 mcg/dL    Iron 60 50 - 175 mcg/dL    Iron Saturation 18 15 - 50 %   CBC & Differential   Result Value Ref Range    WBC 9.80 3.50 - 10.80 10*3/mm3    RBC 4.48 3.89 - 5.14 10*6/mm3    Hemoglobin 14.0 11.5 - 15.5 g/dL    Hematocrit 43.7 34.5 - 44.0 %    MCV 97.5 80.0 - 99.0 fL    MCH 31.3 (H) 27.0 - 31.0 pg    MCHC 32.0 32.0 - 36.0 g/dL    RDW 14.7 (H) 11.3 - 14.5 %    Platelets 337 150 - 450 10*3/mm3    Neutrophil Rel % 70.6 41.0 - 71.0 %    Lymphocyte Rel % 15.0 (L) 24.0 - 44.0 %    Monocyte Rel % 7.7 0.0 - 12.0 %    Eosinophil Rel % 5.9 (H) 0.0 - 3.0 %    Basophil Rel % 0.6 0.0 - 1.0 %    Neutrophils Absolute 6.92 1.50 - 8.30 10*3/mm3    Lymphocytes Absolute 1.47  0.60 - 4.80 10*3/mm3    Monocytes Absolute 0.75 0.00 - 1.00 10*3/mm3    Eosinophils Absolute 0.58 (H) 0.00 - 0.30 10*3/mm3    Basophils Absolute 0.06 0.00 - 0.20 10*3/mm3    Immature Granulocyte Rel % 0.2 0.0 - 0.6 %    Immature Grans Absolute 0.02 0.00 - 0.03 10*3/mm3             Assessment/Plan   Diagnoses and all orders for this visit:    B12 deficiency  -     Cyanocobalamin 1000 MCG sublingual tablet; Place 1 tablet under the tongue Daily.  -     CBC (No Diff); Future  -     Vitamin B12; Future    Iron deficiency  -     ferrous sulfate 325 (65 FE) MG tablet; Take 1 tablet by mouth Daily. With food  -     CBC (No Diff); Future  -     Ferritin; Future  -     Iron Profile; Future    Dyslipidemia  -     Comprehensive Metabolic Panel; Future  -     Lipid Panel; Future    Other fatigue    Vitamin D deficiency  -     Vitamin D 25 Hydroxy; Future    Other orders  -     FLUoxetine (PROzac) 60 MG tablet; Take 60 mg by mouth Daily.                 Return in about 3 months (around 9/15/2018) for Medicare Wellness- 9/5/18 at 12.30.

## 2018-06-28 RX ORDER — PANTOPRAZOLE SODIUM 40 MG/1
TABLET, DELAYED RELEASE ORAL
Qty: 30 TABLET | Refills: 5 | Status: SHIPPED | OUTPATIENT
Start: 2018-06-28 | End: 2019-06-07 | Stop reason: SDUPTHER

## 2018-07-26 RX ORDER — RANITIDINE 300 MG/1
TABLET ORAL
Qty: 30 TABLET | Refills: 5 | Status: SHIPPED | OUTPATIENT
Start: 2018-07-26 | End: 2019-09-18 | Stop reason: SDUPTHER

## 2018-08-15 ENCOUNTER — APPOINTMENT (OUTPATIENT)
Dept: LAB | Facility: HOSPITAL | Age: 73
End: 2018-08-15

## 2018-09-05 ENCOUNTER — OFFICE VISIT (OUTPATIENT)
Dept: INTERNAL MEDICINE | Facility: CLINIC | Age: 73
End: 2018-09-05

## 2018-09-05 VITALS
BODY MASS INDEX: 20.99 KG/M2 | WEIGHT: 126 LBS | HEIGHT: 65 IN | DIASTOLIC BLOOD PRESSURE: 56 MMHG | OXYGEN SATURATION: 98 % | SYSTOLIC BLOOD PRESSURE: 118 MMHG | HEART RATE: 92 BPM

## 2018-09-05 DIAGNOSIS — E61.1 IRON DEFICIENCY: ICD-10-CM

## 2018-09-05 DIAGNOSIS — E78.5 DYSLIPIDEMIA: ICD-10-CM

## 2018-09-05 DIAGNOSIS — E53.8 B12 DEFICIENCY: ICD-10-CM

## 2018-09-05 DIAGNOSIS — K21.9 GASTROESOPHAGEAL REFLUX DISEASE WITHOUT ESOPHAGITIS: ICD-10-CM

## 2018-09-05 DIAGNOSIS — R94.6 ABNORMAL THYROID FUNCTION TEST: Primary | ICD-10-CM

## 2018-09-05 PROCEDURE — G0008 ADMIN INFLUENZA VIRUS VAC: HCPCS | Performed by: INTERNAL MEDICINE

## 2018-09-05 PROCEDURE — G0439 PPPS, SUBSEQ VISIT: HCPCS | Performed by: INTERNAL MEDICINE

## 2018-09-05 PROCEDURE — 96160 PT-FOCUSED HLTH RISK ASSMT: CPT | Performed by: INTERNAL MEDICINE

## 2018-09-05 PROCEDURE — 90662 IIV NO PRSV INCREASED AG IM: CPT | Performed by: INTERNAL MEDICINE

## 2018-09-05 NOTE — PROGRESS NOTES
QUICK REFERENCE INFORMATION:  The ABCs of the Annual Wellness Visit    Subsequent Medicare Wellness Visit    HEALTH RISK ASSESSMENT    1945    Recent Hospitalizations:  No hospitalization(s) within the last year..        Current Medical Providers:  Patient Care Team:  Aleida Stein MD as PCP - General (Internal Medicine)  Aleida Stein MD as Referring Physician (Internal Medicine)        Smoking Status:  History   Smoking Status   • Never Smoker   Smokeless Tobacco   • Never Used       Alcohol Consumption:  History   Alcohol Use No       Depression Screen:   PHQ-2/PHQ-9 Depression Screening 9/5/2018   Little interest or pleasure in doing things 0   Feeling down, depressed, or hopeless 0   Total Score 0       Health Habits and Functional and Cognitive Screening:  Functional & Cognitive Status 9/5/2018   Do you have difficulty preparing food and eating? No   Do you have difficulty bathing yourself, getting dressed or grooming yourself? No   Do you have difficulty using the toilet? No   Do you have difficulty moving around from place to place? No   Do you have trouble with steps or getting out of a bed or a chair? No   In the past year have you fallen or experienced a near fall? No   Current Diet Well Balanced Diet   Dental Exam Up to date   Eye Exam Up to date   Exercise (times per week) 7 times per week   Current Exercise Activities Include Pilates   Do you need help using the phone?  No   Are you deaf or do you have serious difficulty hearing?  No   Do you need help with transportation? No   Do you need help shopping? No   Do you need help preparing meals?  No   Do you need help with housework?  No   Do you need help with laundry? No   Do you need help taking your medications? No   Do you need help managing money? No   Do you ever drive or ride in a car without wearing a seat belt? No   Have you felt unusual stress, anger or loneliness in the last month? No   Who do you live with? Alone   If you need  help, do you have trouble finding someone available to you? No   Have you been bothered in the last four weeks by sexual problems? No   Do you have difficulty concentrating, remembering or making decisions? No           Does the patient have evidence of cognitive impairment? No    Aspirin use counseling: Does not need ASA (and currently is not on it)      Recent Lab Results:  CMP:  Lab Results   Component Value Date    GLU 82 05/04/2018    BUN 20 05/04/2018    CREATININE 0.80 05/04/2018    EGFRIFNONA 71 05/04/2018    EGFRIFAFRI 86 05/04/2018    BCR 25.0 05/04/2018     05/04/2018    K 4.8 05/04/2018    CO2 33.0 (H) 05/04/2018    CALCIUM 9.6 05/04/2018    PROTENTOTREF 6.8 05/04/2018    ALBUMIN 4.30 05/04/2018    LABGLOBREF 2.5 05/04/2018    LABIL2 1.7 05/04/2018    BILITOT 0.3 05/04/2018    ALKPHOS 130 (H) 05/04/2018    AST 30 05/04/2018    ALT 29 05/04/2018     Lipid Panel:     HbA1c:       Visual Acuity:  No exam data present    Age-appropriate Screening Schedule:  Refer to the list below for future screening recommendations based on patient's age, sex and/or medical conditions. Orders for these recommended tests are listed in the plan section. The patient has been provided with a written plan.    Health Maintenance   Topic Date Due   • ZOSTER VACCINE (2 of 2) 07/05/2018   • MAMMOGRAM  11/13/2019   • COLONOSCOPY  07/10/2024   • TDAP/TD VACCINES (3 - Td) 07/11/2026   • INFLUENZA VACCINE  Completed   • PNEUMOCOCCAL VACCINES (65+ LOW/MEDIUM RISK)  Completed        Subjective   History of Present Illness    Maura Barry is a 73 y.o. female who presents for an Subsequent Wellness Visit.    The following portions of the patient's history were reviewed and updated as appropriate: allergies, current medications, past family history, past medical history, past social history, past surgical history and problem list.    Outpatient Medications Prior to Visit   Medication Sig Dispense Refill   • ALLERGY SERUM INJECTION  Inject  under the skin 1 (One) Time Per Week.     • Ascorbic Acid (VITAMIN C) 500 MG capsule Take 1,000 mg by mouth Daily.     • aspirin 81 MG tablet Take 81 mg by mouth Daily.     • Calcium-Magnesium (CALCIUM MAGNESIUM 750) 300-300 MG tablet Take 3 tablets by mouth Daily.     • Cholecalciferol (VITAMIN D3) 5000 units capsule capsule Take 5,000 Units by mouth Daily.     • Cyanocobalamin 1000 MCG sublingual tablet Place 1 tablet under the tongue Daily. 30 each 5   • ferrous sulfate 325 (65 FE) MG tablet Take 1 tablet by mouth Daily. With food 30 tablet 5   • FLUAD 0.5 ML suspension prefilled syringe 1 (One) Time.  0   • FLUoxetine (PROzac) 60 MG tablet Take 60 mg by mouth Daily.     • fluticasone (FLONASE) 50 MCG/ACT nasal spray Daily.  1   • Glucosamine HCl 1000 MG tablet Take 2,000 mg by mouth Daily.     • magnesium oxide (MAGOX) 400 (241.3 Mg) MG tablet tablet Take 400 mg by mouth Daily.     • memantine (NAMENDA) 10 MG tablet Take 10 mg by mouth 2 (Two) Times a Day.     • Omega-3 Fatty Acids (FISH OIL) 1000 MG capsule capsule Take 1,000 mg by mouth Daily With Breakfast.     • pantoprazole (PROTONIX) 40 MG EC tablet take 1 tablet by mouth once daily 30 tablet 5   • raNITIdine (ZANTAC) 300 MG tablet take 1 tablet by mouth at bedtime for STOMACH ACID 30 tablet 5   • traZODone (DESYREL) 50 MG tablet Take 25 mg by mouth Every Night.       No facility-administered medications prior to visit.        Patient Active Problem List   Diagnosis   • KRISTY on CPAP   • B12 deficiency   • Iron deficiency   • Dyslipidemia   • Other fatigue       Advance Care Planning:  has an advance directive - a copy HAS NOT been provided. Have asked the patient to send this to us to add to record.    Identification of Risk Factors:  Risk factors include: cardiovascular risk.    Review of Systems   Constitutional: Negative.  Negative for chills and fever.   HENT: Positive for congestion and postnasal drip. Negative for ear discharge, ear pain,  sinus pressure and sore throat.    Respiratory: Negative for cough, chest tightness and shortness of breath.    Cardiovascular: Negative for chest pain, palpitations and leg swelling.   Gastrointestinal: Negative for diarrhea, nausea and vomiting.   Musculoskeletal: Negative for arthralgias, back pain and myalgias.   Neurological: Positive for dizziness (better) and headaches. Negative for syncope.   Psychiatric/Behavioral: Negative for confusion and sleep disturbance.       Compared to one year ago, the patient feels her physical health is better.  Compared to one year ago, the patient feels her mental health is better.    Objective     Physical Exam   Constitutional: She is oriented to person, place, and time. She appears well-developed and well-nourished.   HENT:   Head: Normocephalic and atraumatic.   Right Ear: External ear normal. Tympanic membrane is bulging.   Left Ear: External ear normal. Tympanic membrane is bulging.   Mouth/Throat: Posterior oropharyngeal erythema present. No oropharyngeal exudate or tonsillar abscesses.   Eyes: Pupils are equal, round, and reactive to light. Conjunctivae are normal. No scleral icterus.   Neck: Normal range of motion. Neck supple. No thyromegaly present.   Cardiovascular: Normal rate and regular rhythm.  Exam reveals no friction rub.    No murmur heard.  Pulmonary/Chest: Effort normal and breath sounds normal. No stridor. She has no wheezes. She has no rales.   Abdominal: Soft. Bowel sounds are normal. She exhibits no distension and no mass. There is no tenderness. There is no guarding.   Musculoskeletal: She exhibits no edema.   Lymphadenopathy:     She has no cervical adenopathy.   Neurological: She is alert and oriented to person, place, and time. She displays normal reflexes. No cranial nerve deficit. She exhibits normal muscle tone. Coordination normal.   Skin: Skin is warm and dry.   Psychiatric: She has a normal mood and affect. Her behavior is normal. Judgment  "and thought content normal.   Nursing note and vitals reviewed.      Vitals:    09/05/18 1255   BP: 118/56   Pulse: 92   SpO2: 98%   Weight: 57.2 kg (126 lb)   Height: 165.1 cm (65\")   PainSc: 2  Comment: Pt side,       Patient's Body mass index is 20.97 kg/m². BMI is within normal parameters. No follow-up required.      Assessment/Plan   Patient Self-Management and Personalized Health Advice  The patient has been provided with information about: diet, exercise, weight management, prevention of cardiac or vascular disease, the relationship between weight and GERD, fall prevention, designing advance directives and supplements and preventive services including:   · Counseling for cardiovascular disease risk reduction, Diabetes screening, see lab orders, Exercise counseling provided, Fall Risk assessment done, Nutrition counseling provided, Zostavax vaccine (Herpes Zoster).    Visit Diagnoses:    ICD-10-CM ICD-9-CM   1. Abnormal thyroid function test R94.6 794.5   2. Iron deficiency E61.1 280.9   3. B12 deficiency E53.8 266.2   4. Dyslipidemia E78.5 272.4   5. Gastroesophageal reflux disease without esophagitis K21.9 530.81       Orders Placed This Encounter   Procedures   • Flu Vaccine High Dose PF 65YR+ ([id number])   • Thyroglobulin     Standing Status:   Future     Number of Occurrences:   1     Standing Expiration Date:   8/2/2019   • Thyroid Peroxidase Antibody     Standing Status:   Future     Number of Occurrences:   1       Outpatient Encounter Prescriptions as of 9/5/2018   Medication Sig Dispense Refill   • ALLERGY SERUM INJECTION Inject  under the skin 1 (One) Time Per Week.     • Ascorbic Acid (VITAMIN C) 500 MG capsule Take 1,000 mg by mouth Daily.     • aspirin 81 MG tablet Take 81 mg by mouth Daily.     • Calcium-Magnesium (CALCIUM MAGNESIUM 750) 300-300 MG tablet Take 3 tablets by mouth Daily.     • Cholecalciferol (VITAMIN D3) 5000 units capsule capsule Take 5,000 Units by mouth Daily.     • " Cyanocobalamin 1000 MCG sublingual tablet Place 1 tablet under the tongue Daily. 30 each 5   • ferrous sulfate 325 (65 FE) MG tablet Take 1 tablet by mouth Daily. With food 30 tablet 5   • FLUAD 0.5 ML suspension prefilled syringe 1 (One) Time.  0   • FLUoxetine (PROzac) 60 MG tablet Take 60 mg by mouth Daily.     • fluticasone (FLONASE) 50 MCG/ACT nasal spray Daily.  1   • Glucosamine HCl 1000 MG tablet Take 2,000 mg by mouth Daily.     • magnesium oxide (MAGOX) 400 (241.3 Mg) MG tablet tablet Take 400 mg by mouth Daily.     • memantine (NAMENDA) 10 MG tablet Take 10 mg by mouth 2 (Two) Times a Day.     • Omega-3 Fatty Acids (FISH OIL) 1000 MG capsule capsule Take 1,000 mg by mouth Daily With Breakfast.     • pantoprazole (PROTONIX) 40 MG EC tablet take 1 tablet by mouth once daily 30 tablet 5   • raNITIdine (ZANTAC) 300 MG tablet take 1 tablet by mouth at bedtime for STOMACH ACID 30 tablet 5   • traZODone (DESYREL) 50 MG tablet Take 25 mg by mouth Every Night.       No facility-administered encounter medications on file as of 9/5/2018.        Reviewed use of high risk medication in the elderly: yes  Reviewed for potential of harmful drug interactions in the elderly: yes    Subsequent Medicare Wellness    Subjective   Maura Barry is a 73 y.o. female is here today for follow-up.    HPI:     Pt is here for a follow up on her dizziness, gerd, iron deficiency.  Reports doing much better. Taking the decongestant and hydration helps.      Current Outpatient Prescriptions:   •  ALLERGY SERUM INJECTION, Inject  under the skin 1 (One) Time Per Week., Disp: , Rfl:   •  Ascorbic Acid (VITAMIN C) 500 MG capsule, Take 1,000 mg by mouth Daily., Disp: , Rfl:   •  aspirin 81 MG tablet, Take 81 mg by mouth Daily., Disp: , Rfl:   •  Calcium-Magnesium (CALCIUM MAGNESIUM 750) 300-300 MG tablet, Take 3 tablets by mouth Daily., Disp: , Rfl:   •  Cholecalciferol (VITAMIN D3) 5000 units capsule capsule, Take 5,000 Units by mouth  "Daily., Disp: , Rfl:   •  Cyanocobalamin 1000 MCG sublingual tablet, Place 1 tablet under the tongue Daily., Disp: 30 each, Rfl: 5  •  ferrous sulfate 325 (65 FE) MG tablet, Take 1 tablet by mouth Daily. With food, Disp: 30 tablet, Rfl: 5  •  FLUAD 0.5 ML suspension prefilled syringe, 1 (One) Time., Disp: , Rfl: 0  •  FLUoxetine (PROzac) 60 MG tablet, Take 60 mg by mouth Daily., Disp: , Rfl:   •  fluticasone (FLONASE) 50 MCG/ACT nasal spray, Daily., Disp: , Rfl: 1  •  Glucosamine HCl 1000 MG tablet, Take 2,000 mg by mouth Daily., Disp: , Rfl:   •  magnesium oxide (MAGOX) 400 (241.3 Mg) MG tablet tablet, Take 400 mg by mouth Daily., Disp: , Rfl:   •  memantine (NAMENDA) 10 MG tablet, Take 10 mg by mouth 2 (Two) Times a Day., Disp: , Rfl:   •  Omega-3 Fatty Acids (FISH OIL) 1000 MG capsule capsule, Take 1,000 mg by mouth Daily With Breakfast., Disp: , Rfl:   •  pantoprazole (PROTONIX) 40 MG EC tablet, take 1 tablet by mouth once daily, Disp: 30 tablet, Rfl: 5  •  raNITIdine (ZANTAC) 300 MG tablet, take 1 tablet by mouth at bedtime for STOMACH ACID, Disp: 30 tablet, Rfl: 5  •  traZODone (DESYREL) 50 MG tablet, Take 25 mg by mouth Every Night., Disp: , Rfl:       The following portions of the patient's history were reviewed and updated as appropriate: allergies, current medications, past family history, past medical history, past social history, past surgical history and problem list.        Objective   /56   Pulse 92   Ht 165.1 cm (65\")   Wt 57.2 kg (126 lb)   SpO2 98%   Breastfeeding? No   BMI 20.97 kg/m²         Results for orders placed or performed in visit on 05/04/18   Comprehensive Metabolic Panel   Result Value Ref Range    Glucose 82 70 - 100 mg/dL    BUN 20 9 - 23 mg/dL    Creatinine 0.80 0.60 - 1.30 mg/dL    eGFR Non African Am 71 >60 mL/min/1.73    eGFR African Am 86 >60 mL/min/1.73    BUN/Creatinine Ratio 25.0 7.0 - 25.0    Sodium 142 132 - 146 mmol/L    Potassium 4.8 3.5 - 5.5 mmol/L    " Chloride 106 99 - 109 mmol/L    Total CO2 33.0 (H) 20.0 - 31.0 mmol/L    Calcium 9.6 8.7 - 10.4 mg/dL    Total Protein 6.8 5.7 - 8.2 g/dL    Albumin 4.30 3.20 - 4.80 g/dL    Globulin 2.5 gm/dL    A/G Ratio 1.7 1.5 - 2.5 g/dL    Total Bilirubin 0.3 0.3 - 1.2 mg/dL    Alkaline Phosphatase 130 (H) 25 - 100 U/L    AST (SGOT) 30 0 - 33 U/L    ALT (SGPT) 29 7 - 40 U/L   TSH   Result Value Ref Range    TSH 1.630 0.350 - 5.350 mIU/mL   Vitamin B12   Result Value Ref Range    Vitamin B-12 503 211 - 911 pg/mL   KEEGAN With / DsDNA, RNP, Sjogrens A / B, Crump   Result Value Ref Range    KEEGAN Direct Negative Negative   Nuclear Antigen Antibody, IFA   Result Value Ref Range    KEEGAN Negative    Ferritin   Result Value Ref Range    Ferritin 31.00 10.00 - 291.00 ng/mL   Iron Profile   Result Value Ref Range    TIBC 340 250 - 450 mcg/dL    UIBC 280 mcg/dL    Iron 60 50 - 175 mcg/dL    Iron Saturation 18 15 - 50 %   CBC & Differential   Result Value Ref Range    WBC 9.80 3.50 - 10.80 10*3/mm3    RBC 4.48 3.89 - 5.14 10*6/mm3    Hemoglobin 14.0 11.5 - 15.5 g/dL    Hematocrit 43.7 34.5 - 44.0 %    MCV 97.5 80.0 - 99.0 fL    MCH 31.3 (H) 27.0 - 31.0 pg    MCHC 32.0 32.0 - 36.0 g/dL    RDW 14.7 (H) 11.3 - 14.5 %    Platelets 337 150 - 450 10*3/mm3    Neutrophil Rel % 70.6 41.0 - 71.0 %    Lymphocyte Rel % 15.0 (L) 24.0 - 44.0 %    Monocyte Rel % 7.7 0.0 - 12.0 %    Eosinophil Rel % 5.9 (H) 0.0 - 3.0 %    Basophil Rel % 0.6 0.0 - 1.0 %    Neutrophils Absolute 6.92 1.50 - 8.30 10*3/mm3    Lymphocytes Absolute 1.47 0.60 - 4.80 10*3/mm3    Monocytes Absolute 0.75 0.00 - 1.00 10*3/mm3    Eosinophils Absolute 0.58 (H) 0.00 - 0.30 10*3/mm3    Basophils Absolute 0.06 0.00 - 0.20 10*3/mm3    Immature Granulocyte Rel % 0.2 0.0 - 0.6 %    Immature Grans Absolute 0.02 0.00 - 0.03 10*3/mm3             Assessment/Plan   Diagnoses and all orders for this visit:    Abnormal thyroid function test  -     Thyroglobulin; Future  -     Thyroid Peroxidase  Antibody; Future    Iron deficiency    B12 deficiency    Dyslipidemia    Gastroesophageal reflux disease without esophagitis    Other orders  -     Flu Vaccine High Dose PF 65YR+ ([id number])    STable, continue current meds and supplements.  Labs done, reviewed with patient-   Cholesterol, electrolytes, thyroid labs are normal.           Return in about 1 year (around 9/5/2019) for Medicare Wellness.    Follow Up:  Return in about 1 year (around 9/5/2019) for Medicare Wellness.     An After Visit Summary and PPPS with all of these plans were given to the patient.

## 2018-10-09 ENCOUNTER — TRANSCRIBE ORDERS (OUTPATIENT)
Dept: ADMINISTRATIVE | Facility: HOSPITAL | Age: 73
End: 2018-10-09

## 2018-10-09 DIAGNOSIS — Z12.31 VISIT FOR SCREENING MAMMOGRAM: Primary | ICD-10-CM

## 2018-10-22 ENCOUNTER — TELEPHONE (OUTPATIENT)
Dept: PULMONOLOGY | Facility: CLINIC | Age: 73
End: 2018-10-22

## 2018-10-22 NOTE — TELEPHONE ENCOUNTER
CALL FROM PT STATING THAT SHE IS NO LONGER USING HER C-PAP MACHINE AND DOES NOT THINK THAT SHE NEEDS HER YEARLY FOLLOW UP WITH JACOB SILVA. SHE WILL CALL BACK IF ANYTHING ARISES AND SHE FILLS SHE NEEDS TO BE SEEN.

## 2018-10-29 ENCOUNTER — PATIENT MESSAGE (OUTPATIENT)
Dept: INTERNAL MEDICINE | Facility: CLINIC | Age: 73
End: 2018-10-29

## 2018-10-29 RX ORDER — DESLORATADINE 5 MG/1
5 TABLET ORAL DAILY
Qty: 90 TABLET | Refills: 1 | Status: SHIPPED | OUTPATIENT
Start: 2018-10-29 | End: 2019-04-26 | Stop reason: SDUPTHER

## 2018-10-29 NOTE — TELEPHONE ENCOUNTER
From: Maura Barry  To: Bronwyn Bird MD  Sent: 10/29/2018 9:39 AM EDT  Subject: Prescription Question    Hi Dr. LESLIE  Last week my pharmacy ([facility]) sent you a request to have my Desoratadine Rx refilled. They have not had a response from your office as yet. I am wondering if it is because my last Rx was a refill left over from Dr. Stein. I do not believe that you,as yet, have given me an Rx for this medication. I have been out for several days, so please respond as quickly as is convenient for you.   Thank you, Portia Barry

## 2018-11-27 ENCOUNTER — HOSPITAL ENCOUNTER (OUTPATIENT)
Dept: MAMMOGRAPHY | Facility: HOSPITAL | Age: 73
Discharge: HOME | End: 2018-11-27
Attending: OBSTETRICS & GYNECOLOGY | Admitting: OBSTETRICS & GYNECOLOGY

## 2018-11-27 DIAGNOSIS — Z12.31 VISIT FOR SCREENING MAMMOGRAM: ICD-10-CM

## 2018-11-27 PROCEDURE — 77067 SCR MAMMO BI INCL CAD: CPT | Performed by: RADIOLOGY

## 2018-11-27 PROCEDURE — 77067 SCR MAMMO BI INCL CAD: CPT

## 2018-11-27 PROCEDURE — 77063 BREAST TOMOSYNTHESIS BI: CPT

## 2018-11-27 PROCEDURE — 77063 BREAST TOMOSYNTHESIS BI: CPT | Performed by: RADIOLOGY

## 2018-12-11 ENCOUNTER — TELEPHONE (OUTPATIENT)
Dept: INTERNAL MEDICINE | Facility: CLINIC | Age: 73
End: 2018-12-11

## 2018-12-11 RX ORDER — PSEUDOEPHEDRINE HCL 120 MG/1
120 TABLET, FILM COATED, EXTENDED RELEASE ORAL EVERY 12 HOURS
Qty: 60 TABLET | Refills: 5 | Status: SHIPPED | OUTPATIENT
Start: 2018-12-11 | End: 2020-08-12 | Stop reason: SDUPTHER

## 2018-12-11 NOTE — TELEPHONE ENCOUNTER
PATIENT WOULD LIKE TO KNOW IF DR THRASHER COULD CALL HER IN A SCRIPT FOR [employer]? SHE STATES THAT SHE NORMALLY GETS IT OVER THE COUNTER BUT SHE WAS TOLD SHE NEEDED TO CALL HER PRIMARY DOCTOR TO HAVE THEM SEND IN A SCRIPT. THE PT WOULD LIKE TO GET A CALL BACK [phone] AS SHE WOULD LIKE THIS CALLED IN TODAY.

## 2019-01-01 DIAGNOSIS — E61.1 IRON DEFICIENCY: ICD-10-CM

## 2019-01-02 RX ORDER — FERROUS SULFATE 325(65) MG
TABLET ORAL
Qty: 30 TABLET | Refills: 0 | Status: SHIPPED | OUTPATIENT
Start: 2019-01-02 | End: 2019-01-27 | Stop reason: SDUPTHER

## 2019-01-27 DIAGNOSIS — E61.1 IRON DEFICIENCY: ICD-10-CM

## 2019-01-28 RX ORDER — FERROUS SULFATE 325(65) MG
TABLET ORAL
Qty: 30 TABLET | Refills: 5 | Status: SHIPPED | OUTPATIENT
Start: 2019-01-28 | End: 2019-09-18

## 2019-04-26 RX ORDER — DESLORATADINE 5 MG/1
5 TABLET ORAL DAILY
Qty: 90 TABLET | Refills: 0 | Status: SHIPPED | OUTPATIENT
Start: 2019-04-26 | End: 2019-09-18 | Stop reason: SDUPTHER

## 2019-06-07 RX ORDER — PANTOPRAZOLE SODIUM 40 MG/1
TABLET, DELAYED RELEASE ORAL
Qty: 30 TABLET | Refills: 0 | Status: SHIPPED | OUTPATIENT
Start: 2019-06-07 | End: 2019-08-23 | Stop reason: SDUPTHER

## 2019-08-23 RX ORDER — PANTOPRAZOLE SODIUM 40 MG/1
TABLET, DELAYED RELEASE ORAL
Qty: 30 TABLET | Refills: 0 | Status: SHIPPED | OUTPATIENT
Start: 2019-08-23 | End: 2019-09-18 | Stop reason: SDUPTHER

## 2019-09-02 RX ORDER — RANITIDINE 300 MG/1
TABLET ORAL
Qty: 30 TABLET | Refills: 0 | OUTPATIENT
Start: 2019-09-02

## 2019-09-09 ENCOUNTER — PATIENT MESSAGE (OUTPATIENT)
Dept: INTERNAL MEDICINE | Facility: CLINIC | Age: 74
End: 2019-09-09

## 2019-09-13 ENCOUNTER — LAB REQUISITION (OUTPATIENT)
Dept: LAB | Facility: HOSPITAL | Age: 74
End: 2019-09-13

## 2019-09-13 DIAGNOSIS — Z00.00 ROUTINE GENERAL MEDICAL EXAMINATION AT A HEALTH CARE FACILITY: ICD-10-CM

## 2019-09-13 PROCEDURE — 36415 COLL VENOUS BLD VENIPUNCTURE: CPT | Performed by: INTERNAL MEDICINE

## 2019-09-14 ENCOUNTER — RESULTS ENCOUNTER (OUTPATIENT)
Dept: INTERNAL MEDICINE | Facility: CLINIC | Age: 74
End: 2019-09-14

## 2019-09-14 DIAGNOSIS — E53.8 B12 DEFICIENCY: ICD-10-CM

## 2019-09-16 LAB
Lab: NORMAL
VIT B12 SERPL-MCNC: 1322 PG/ML (ref 232–1245)

## 2019-09-17 LAB
25(OH)D3+25(OH)D2 SERPL-MCNC: 85.8 NG/ML (ref 30–100)
ALBUMIN SERPL-MCNC: 4.3 G/DL (ref 3.5–4.8)
ALBUMIN/GLOB SERPL: 2 {RATIO} (ref 1.2–2.2)
ALP SERPL-CCNC: 100 IU/L (ref 39–117)
ALT SERPL-CCNC: 23 IU/L (ref 0–32)
AST SERPL-CCNC: 27 IU/L (ref 0–40)
BILIRUB SERPL-MCNC: 0.3 MG/DL (ref 0–1.2)
BUN SERPL-MCNC: 19 MG/DL (ref 8–27)
BUN/CREAT SERPL: 21 (ref 12–28)
CALCIUM SERPL-MCNC: 9.6 MG/DL (ref 8.7–10.3)
CHLORIDE SERPL-SCNC: 104 MMOL/L (ref 96–106)
CHOLEST SERPL-MCNC: 230 MG/DL (ref 100–199)
CO2 SERPL-SCNC: 24 MMOL/L (ref 20–29)
CREAT SERPL-MCNC: 0.89 MG/DL (ref 0.57–1)
ERYTHROCYTE [DISTWIDTH] IN BLOOD BY AUTOMATED COUNT: 13.7 % (ref 12.3–15.4)
GLOBULIN SER CALC-MCNC: 2.2 G/DL (ref 1.5–4.5)
GLUCOSE SERPL-MCNC: 81 MG/DL (ref 65–99)
HCT VFR BLD AUTO: 40.5 % (ref 34–46.6)
HDLC SERPL-MCNC: 67 MG/DL
HGB BLD-MCNC: 13.6 G/DL (ref 11.1–15.9)
LDLC SERPL CALC-MCNC: 149 MG/DL (ref 0–99)
MCH RBC QN AUTO: 31.9 PG (ref 26.6–33)
MCHC RBC AUTO-ENTMCNC: 33.6 G/DL (ref 31.5–35.7)
MCV RBC AUTO: 95 FL (ref 79–97)
PLATELET # BLD AUTO: 294 X10E3/UL (ref 150–450)
POTASSIUM SERPL-SCNC: 4.5 MMOL/L (ref 3.5–5.2)
PROT SERPL-MCNC: 6.5 G/DL (ref 6–8.5)
RBC # BLD AUTO: 4.27 X10E6/UL (ref 3.77–5.28)
SODIUM SERPL-SCNC: 143 MMOL/L (ref 134–144)
TRIGL SERPL-MCNC: 72 MG/DL (ref 0–149)
TSH SERPL DL<=0.005 MIU/L-ACNC: 3.7 UIU/ML (ref 0.45–4.5)
VLDLC SERPL CALC-MCNC: 14 MG/DL (ref 5–40)
WBC # BLD AUTO: 5.4 X10E3/UL (ref 3.4–10.8)
WRITTEN AUTHORIZATION: NORMAL

## 2019-09-18 ENCOUNTER — OFFICE VISIT (OUTPATIENT)
Dept: INTERNAL MEDICINE | Facility: CLINIC | Age: 74
End: 2019-09-18

## 2019-09-18 ENCOUNTER — TRANSCRIBE ORDERS (OUTPATIENT)
Dept: ADMINISTRATIVE | Facility: HOSPITAL | Age: 74
End: 2019-09-18

## 2019-09-18 VITALS
HEIGHT: 65 IN | DIASTOLIC BLOOD PRESSURE: 70 MMHG | WEIGHT: 130.2 LBS | BODY MASS INDEX: 21.69 KG/M2 | OXYGEN SATURATION: 98 % | SYSTOLIC BLOOD PRESSURE: 122 MMHG | HEART RATE: 88 BPM

## 2019-09-18 DIAGNOSIS — Z12.31 VISIT FOR SCREENING MAMMOGRAM: Primary | ICD-10-CM

## 2019-09-18 DIAGNOSIS — M54.50 CHRONIC RIGHT-SIDED LOW BACK PAIN WITHOUT SCIATICA: ICD-10-CM

## 2019-09-18 DIAGNOSIS — M79.641 PAIN IN BOTH HANDS: ICD-10-CM

## 2019-09-18 DIAGNOSIS — Z78.0 POSTMENOPAUSAL: ICD-10-CM

## 2019-09-18 DIAGNOSIS — G47.33 OSA (OBSTRUCTIVE SLEEP APNEA): ICD-10-CM

## 2019-09-18 DIAGNOSIS — E53.8 B12 DEFICIENCY: ICD-10-CM

## 2019-09-18 DIAGNOSIS — J30.89 NON-SEASONAL ALLERGIC RHINITIS DUE TO OTHER ALLERGIC TRIGGER: ICD-10-CM

## 2019-09-18 DIAGNOSIS — E55.9 VITAMIN D DEFICIENCY: ICD-10-CM

## 2019-09-18 DIAGNOSIS — E78.5 DYSLIPIDEMIA: ICD-10-CM

## 2019-09-18 DIAGNOSIS — G89.29 CHRONIC RIGHT-SIDED LOW BACK PAIN WITHOUT SCIATICA: ICD-10-CM

## 2019-09-18 DIAGNOSIS — Z00.00 MEDICARE ANNUAL WELLNESS VISIT, SUBSEQUENT: Primary | ICD-10-CM

## 2019-09-18 DIAGNOSIS — E61.1 IRON DEFICIENCY: ICD-10-CM

## 2019-09-18 DIAGNOSIS — M79.642 PAIN IN BOTH HANDS: ICD-10-CM

## 2019-09-18 PROCEDURE — G0444 DEPRESSION SCREEN ANNUAL: HCPCS | Performed by: INTERNAL MEDICINE

## 2019-09-18 PROCEDURE — G0439 PPPS, SUBSEQ VISIT: HCPCS | Performed by: INTERNAL MEDICINE

## 2019-09-18 PROCEDURE — 96160 PT-FOCUSED HLTH RISK ASSMT: CPT | Performed by: INTERNAL MEDICINE

## 2019-09-18 PROCEDURE — 99212 OFFICE O/P EST SF 10 MIN: CPT | Performed by: INTERNAL MEDICINE

## 2019-09-18 RX ORDER — DESLORATADINE 5 MG/1
5 TABLET ORAL DAILY
Qty: 90 TABLET | Refills: 1 | Status: SHIPPED | OUTPATIENT
Start: 2019-09-18 | End: 2020-01-22 | Stop reason: SDUPTHER

## 2019-09-18 RX ORDER — RANITIDINE 300 MG/1
300 TABLET ORAL NIGHTLY
Qty: 30 TABLET | Refills: 5 | Status: SHIPPED | OUTPATIENT
Start: 2019-09-18 | End: 2020-05-18

## 2019-09-18 RX ORDER — TRAZODONE HYDROCHLORIDE 50 MG/1
25 TABLET ORAL NIGHTLY
Qty: 90 TABLET | Refills: 3 | Status: SHIPPED | OUTPATIENT
Start: 2019-09-18 | End: 2020-04-28 | Stop reason: SDUPTHER

## 2019-09-18 RX ORDER — BUPROPION HYDROCHLORIDE 300 MG/1
1 TABLET ORAL DAILY
Refills: 2 | COMMUNITY
Start: 2019-07-12

## 2019-09-18 RX ORDER — PANTOPRAZOLE SODIUM 40 MG/1
40 TABLET, DELAYED RELEASE ORAL DAILY
Qty: 90 TABLET | Refills: 3 | Status: SHIPPED | OUTPATIENT
Start: 2019-09-18 | End: 2020-09-23 | Stop reason: SDUPTHER

## 2019-09-18 NOTE — PROGRESS NOTES
The ABCs of the Annual Wellness Visit  Subsequent Medicare Wellness Visit    Chief Complaint   Patient presents with   • Medicare Wellness-subsequent       Subjective   History of Present Illness:  Maura Barry is a 74 y.o. female who presents for a Subsequent Medicare Wellness Visit.    HEALTH RISK ASSESSMENT    Recent Hospitalizations:  No hospitalization(s) within the last year.    Current Medical Providers:  Patient Care Team:  Bronwyn Bird MD as PCP - General (Internal Medicine)    Smoking Status:  Social History     Tobacco Use   Smoking Status Never Smoker   Smokeless Tobacco Never Used       Alcohol Consumption:  Social History     Substance and Sexual Activity   Alcohol Use Yes    Comment: 1 or less a week       Depression Screen:   PHQ-2/PHQ-9 Depression Screening 9/18/2019   Little interest or pleasure in doing things 0   Feeling down, depressed, or hopeless 1   Total Score 1       Fall Risk Screen:  IRMA Fall Risk Assessment was completed, and patient is at MODERATE risk for falls. Assessment completed on:9/18/2019    Health Habits and Functional and Cognitive Screening:  Functional & Cognitive Status 9/18/2019   Do you have difficulty preparing food and eating? No   Do you have difficulty bathing yourself, getting dressed or grooming yourself? No   Do you have difficulty using the toilet? No   Do you have difficulty moving around from place to place? No   Do you have trouble with steps or getting out of a bed or a chair? No   Current Diet Well Balanced Diet   Dental Exam Up to date   Eye Exam Up to date   Exercise (times per week) 7 times per week   Current Exercise Activities Include Walking   Do you need help using the phone?  No   Are you deaf or do you have serious difficulty hearing?  No   Do you need help with transportation? No   Do you need help shopping? No   Do you need help preparing meals?  No   Do you need help with housework?  No   Do you need help with laundry? No   Do you need  help taking your medications? No   Do you need help managing money? No   Do you ever drive or ride in a car without wearing a seat belt? No   Have you felt unusual stress, anger or loneliness in the last month? No   Who do you live with? Other   If you need help, do you have trouble finding someone available to you? No   Have you been bothered in the last four weeks by sexual problems? No   Do you have difficulty concentrating, remembering or making decisions? Yes         Does the patient have evidence of cognitive impairment? No    Asprin use counseling:Does not need ASA but is currently taking (advised patient that ASA is not indicated and patient chooses to stop it)    Age-appropriate Screening Schedule:  Refer to the list below for future screening recommendations based on patient's age, sex and/or medical conditions. Orders for these recommended tests are listed in the plan section. The patient has been provided with a written plan.    Health Maintenance   Topic Date Due   • DXA SCAN  09/18/2019   • INFLUENZA VACCINE  10/13/2019 (Originally 8/1/2019)   • ZOSTER VACCINE (2 of 2) 09/26/2020 (Originally 7/5/2018)   • MAMMOGRAM  11/27/2020   • COLONOSCOPY  07/10/2024   • TDAP/TD VACCINES (3 - Td) 07/11/2026   • PNEUMOCOCCAL VACCINES (65+ LOW/MEDIUM RISK)  Completed          The following portions of the patient's history were reviewed and updated as appropriate: allergies, current medications, past family history, past medical history, past social history, past surgical history and problem list.    Outpatient Medications Prior to Visit   Medication Sig Dispense Refill   • ALLERGY SERUM INJECTION Inject  under the skin 1 (One) Time Per Week.     • Ascorbic Acid (VITAMIN C) 500 MG capsule Take 1,000 mg by mouth Daily.     • Calcium-Magnesium (CALCIUM MAGNESIUM 750) 300-300 MG tablet Take 3 tablets by mouth Daily.     • Cholecalciferol (VITAMIN D3) 5000 units capsule capsule Take 5,000 Units by mouth Daily.     •  Cyanocobalamin 1000 MCG sublingual tablet Place 1 tablet under the tongue Daily. 30 each 5   • FLUAD 0.5 ML suspension prefilled syringe 1 (One) Time.  0   • fluticasone (FLONASE) 50 MCG/ACT nasal spray Daily.  1   • Glucosamine HCl 1000 MG tablet Take 2,000 mg by mouth Daily.     • magnesium oxide (MAGOX) 400 (241.3 Mg) MG tablet tablet Take 400 mg by mouth Daily.     • memantine (NAMENDA) 10 MG tablet Take 10 mg by mouth 2 (Two) Times a Day.     • Omega-3 Fatty Acids (FISH OIL) 1000 MG capsule capsule Take 1,000 mg by mouth Daily With Breakfast.     • pseudoephedrine (SUDAFED 12 HOUR) 120 MG 12 hr tablet Take 1 tablet by mouth Every 12 (Twelve) Hours. 60 tablet 5   • aspirin 81 MG tablet Take 81 mg by mouth Daily.     • desloratadine (CLARINEX) 5 MG tablet TAKE 1 TABLET BY MOUTH DAILY 90 tablet 0   • pantoprazole (PROTONIX) 40 MG EC tablet TAKE 1 TABLET BY MOUTH ONCE DAILY 30 tablet 0   • raNITIdine (ZANTAC) 300 MG tablet take 1 tablet by mouth at bedtime for STOMACH ACID 30 tablet 5   • traZODone (DESYREL) 50 MG tablet Take 25 mg by mouth Every Night.     • buPROPion XL (WELLBUTRIN XL) 300 MG 24 hr tablet Take 1 tablet by mouth Daily.  2   • FEROSUL 325 (65 Fe) MG tablet TAKE 1 TABLET BY MOUTH ONCE DAILY WITH FOOD 30 tablet 5   • FLUoxetine (PROzac) 60 MG tablet Take 60 mg by mouth Daily.       No facility-administered medications prior to visit.        Patient Active Problem List   Diagnosis   • KRISTY (obstructive sleep apnea)   • B12 deficiency   • Iron deficiency   • Dyslipidemia   • Other fatigue       Advanced Care Planning:  Patient has an advance directive - a copy has not been provided. Have asked the patient to send this to us to add to record    Review of Systems   Constitutional: Negative.  Negative for chills, fatigue and fever.   HENT: Negative for congestion, ear discharge, ear pain, sinus pressure and sore throat.    Eyes: Negative for pain, redness and visual disturbance.   Respiratory: Negative for  "cough, chest tightness and shortness of breath.    Cardiovascular: Negative for chest pain, palpitations and leg swelling.   Gastrointestinal: Negative for abdominal pain, diarrhea, nausea and vomiting.   Endocrine: Negative for cold intolerance and heat intolerance.   Genitourinary: Negative for flank pain and urgency.   Musculoskeletal: Positive for arthralgias (hand joint swelling and pain) and back pain. Negative for gait problem and myalgias.   Skin: Negative for pallor and rash.   Neurological: Negative for dizziness, syncope, weakness and headaches.   Psychiatric/Behavioral: Negative for confusion, dysphoric mood and sleep disturbance. The patient is nervous/anxious.        Compared to one year ago, the patient feels her physical health is better.  Compared to one year ago, the patient feels her mental health is better.    Reviewed chart for potential of high risk medication in the elderly: yes  Reviewed chart for potential of harmful drug interactions in the elderly:yes    Objective         Vitals:    09/18/19 0749   BP: 122/70   Pulse: 88   SpO2: 98%  Comment: ra   Weight: 59.1 kg (130 lb 3.2 oz)   Height: 165.1 cm (65\")   PainSc:   8   PainLoc: Generalized       Body mass index is 21.67 kg/m².  Discussed the patient's BMI with her. The BMI is in the acceptable range.    Physical Exam   Constitutional: She is oriented to person, place, and time. She appears well-developed and well-nourished.   HENT:   Head: Normocephalic and atraumatic.   Right Ear: External ear normal.   Left Ear: External ear normal.   Mouth/Throat: No oropharyngeal exudate.   Eyes: Conjunctivae are normal. Pupils are equal, round, and reactive to light.   Neck: Neck supple. No thyromegaly present.   Cardiovascular: Normal rate, regular rhythm and intact distal pulses.   Pulmonary/Chest: Effort normal and breath sounds normal.   Abdominal: Soft. Bowel sounds are normal. She exhibits no distension. There is no tenderness. "   Musculoskeletal: She exhibits tenderness and deformity (2,3 mcp joints, ). She exhibits no edema.   Neurological: She is alert and oriented to person, place, and time. No cranial nerve deficit.   Skin: Skin is warm and dry.   Psychiatric: She has a normal mood and affect. Judgment normal.   Nursing note and vitals reviewed.      Lab Results   Component Value Date    GLU 81 09/13/2019    CHLPL 230 (H) 09/13/2019    TRIG 72 09/13/2019    HDL 67 09/13/2019     (H) 09/13/2019    VLDL 14 09/13/2019        Assessment/Plan   Medicare Risks and Personalized Health Plan  CMS Preventative Services Quick Reference  Breast Cancer/Mammogram Screening  Chronic Pain   Fall Risk  Osteoprorosis Risk    The above risks/problems have been discussed with the patient.  Pertinent information has been shared with the patient in the After Visit Summary.  Follow up plans and orders are seen below in the Assessment/Plan Section.    Medicare Wellness-subsequent    Subjective   Maura Barry is a 74 y.o. female is here today for follow-up.    HPI  Having back issues and joint pains in hands.  Also allergies continue to be acting up.  Unable to use cpap.    Current Outpatient Medications:   •  ALLERGY SERUM INJECTION, Inject  under the skin 1 (One) Time Per Week., Disp: , Rfl:   •  Ascorbic Acid (VITAMIN C) 500 MG capsule, Take 1,000 mg by mouth Daily., Disp: , Rfl:   •  Calcium-Magnesium (CALCIUM MAGNESIUM 750) 300-300 MG tablet, Take 3 tablets by mouth Daily., Disp: , Rfl:   •  Cholecalciferol (VITAMIN D3) 5000 units capsule capsule, Take 5,000 Units by mouth Daily., Disp: , Rfl:   •  Cyanocobalamin 1000 MCG sublingual tablet, Place 1 tablet under the tongue Daily., Disp: 30 each, Rfl: 5  •  desloratadine (CLARINEX) 5 MG tablet, Take 1 tablet by mouth Daily., Disp: 90 tablet, Rfl: 1  •  FLUAD 0.5 ML suspension prefilled syringe, 1 (One) Time., Disp: , Rfl: 0  •  fluticasone (FLONASE) 50 MCG/ACT nasal spray, Daily., Disp: , Rfl:  "1  •  Glucosamine HCl 1000 MG tablet, Take 2,000 mg by mouth Daily., Disp: , Rfl:   •  magnesium oxide (MAGOX) 400 (241.3 Mg) MG tablet tablet, Take 400 mg by mouth Daily., Disp: , Rfl:   •  memantine (NAMENDA) 10 MG tablet, Take 10 mg by mouth 2 (Two) Times a Day., Disp: , Rfl:   •  Omega-3 Fatty Acids (FISH OIL) 1000 MG capsule capsule, Take 1,000 mg by mouth Daily With Breakfast., Disp: , Rfl:   •  pantoprazole (PROTONIX) 40 MG EC tablet, Take 1 tablet by mouth Daily., Disp: 90 tablet, Rfl: 3  •  pseudoephedrine (SUDAFED 12 HOUR) 120 MG 12 hr tablet, Take 1 tablet by mouth Every 12 (Twelve) Hours., Disp: 60 tablet, Rfl: 5  •  raNITIdine (ZANTAC) 300 MG tablet, Take 1 tablet by mouth Every Night., Disp: 30 tablet, Rfl: 5  •  traZODone (DESYREL) 50 MG tablet, Take 0.5 tablets by mouth Every Night., Disp: 90 tablet, Rfl: 3  •  buPROPion XL (WELLBUTRIN XL) 300 MG 24 hr tablet, Take 1 tablet by mouth Daily., Disp: , Rfl: 2      The following portions of the patient's history were reviewed and updated as appropriate: allergies, current medications, past family history, past medical history, past social history, past surgical history and problem list.        Objective   /70   Pulse 88   Ht 165.1 cm (65\")   Wt 59.1 kg (130 lb 3.2 oz)   SpO2 98% Comment: ra  BMI 21.67 kg/m²         Results for orders placed or performed in visit on 09/18/19   Cyclic Citrul Peptide Antibody, IgG / IgA   Result Value Ref Range    CCP Antibodies IgG/IgA 6 0 - 19 units   Sedimentation Rate   Result Value Ref Range    Sed Rate 10 0 - 30 mm/hr   C-reactive Protein   Result Value Ref Range    C-Reactive Protein 0.28 0.00 - 0.50 mg/dL   Ferritin   Result Value Ref Range    Ferritin 112.00 13.00 - 150.00 ng/mL   Iron Profile   Result Value Ref Range    TIBC 392 mcg/dL    UIBC 278 112 - 346 mcg/dL    Iron 114 37 - 145 mcg/dL    Iron Saturation 29 20 - 50 %             Assessment/Plan   Diagnoses and all orders for this visit:    Medicare " annual wellness visit, subsequent    B12 deficiency  -     CBC (No Diff); Future  -     Vitamin B12; Future    Iron deficiency  -     CBC (No Diff); Future  -     Ferritin  -     Iron Profile    Dyslipidemia  -     Comprehensive Metabolic Panel; Future  -     Lipid Panel; Future  -     TSH; Future    Vitamin D deficiency  -     Vitamin D 25 Hydroxy; Future    KRISTY (obstructive sleep apnea)  -     Ferritin  -     Iron Profile    Pain in both hands  -     Cyclic Citrul Peptide Antibody, IgG / IgA  -     Rheumatoid Factor  -     Sedimentation Rate  -     C-reactive Protein  -     Ferritin  -     Iron Profile    Postmenopausal  -     DEXA Bone Density Axial    Non-seasonal allergic rhinitis due to other allergic trigger  -     desloratadine (CLARINEX) 5 MG tablet; Take 1 tablet by mouth Daily.    Chronic right-sided low back pain without sciatica  -     Ambulatory Referral to Physical Medicine Rehab    Other orders  -     buPROPion XL (WELLBUTRIN XL) 300 MG 24 hr tablet; Take 1 tablet by mouth Daily.  -     raNITIdine (ZANTAC) 300 MG tablet; Take 1 tablet by mouth Every Night.  -     traZODone (DESYREL) 50 MG tablet; Take 0.5 tablets by mouth Every Night.  -     pantoprazole (PROTONIX) 40 MG EC tablet; Take 1 tablet by mouth Daily.           PHQ-2/PHQ-9 Depression screening reviewed with patient . Total time spent today for depression screening  with Maura Barry  was 15 minutes in counseling, along with plans for any diagnositc work-up and treatment.      Follow Up:  Return in about 1 year (around 9/18/2020).     An After Visit Summary and PPPS were given to the patient.

## 2019-09-20 LAB
CCP IGA+IGG SERPL IA-ACNC: 6 UNITS (ref 0–19)
CRP SERPL-MCNC: 0.28 MG/DL (ref 0–0.5)
ERYTHROCYTE [SEDIMENTATION RATE] IN BLOOD BY WESTERGREN METHOD: 10 MM/HR (ref 0–30)
FERRITIN SERPL-MCNC: 112 NG/ML (ref 13–150)
IRON SATN MFR SERPL: 29 % (ref 20–50)
IRON SERPL-MCNC: 114 MCG/DL (ref 37–145)
TIBC SERPL-MCNC: 392 MCG/DL
UIBC SERPL-MCNC: 278 MCG/DL (ref 112–346)

## 2019-12-16 ENCOUNTER — HOSPITAL ENCOUNTER (OUTPATIENT)
Dept: BONE DENSITY | Facility: HOSPITAL | Age: 74
Discharge: HOME | End: 2019-12-16

## 2019-12-16 ENCOUNTER — HOSPITAL ENCOUNTER (OUTPATIENT)
Dept: MAMMOGRAPHY | Facility: HOSPITAL | Age: 74
Discharge: HOME | End: 2019-12-16
Admitting: INTERNAL MEDICINE

## 2019-12-16 DIAGNOSIS — Z12.31 VISIT FOR SCREENING MAMMOGRAM: ICD-10-CM

## 2019-12-16 PROCEDURE — 77080 DXA BONE DENSITY AXIAL: CPT

## 2019-12-16 PROCEDURE — 77067 SCR MAMMO BI INCL CAD: CPT | Performed by: RADIOLOGY

## 2019-12-16 PROCEDURE — 77067 SCR MAMMO BI INCL CAD: CPT

## 2019-12-16 PROCEDURE — 77063 BREAST TOMOSYNTHESIS BI: CPT

## 2019-12-16 PROCEDURE — 77063 BREAST TOMOSYNTHESIS BI: CPT | Performed by: RADIOLOGY

## 2019-12-18 ENCOUNTER — TELEPHONE (OUTPATIENT)
Dept: INTERNAL MEDICINE | Facility: CLINIC | Age: 74
End: 2019-12-18

## 2019-12-19 NOTE — TELEPHONE ENCOUNTER
----- Message from Maura Barry sent at 12/18/2019  7:28 AM EST -----  Regarding: Non-Urgent Medical Question  Contact: [phone]  Dr. MARTINEZ, I have seen Dr Stevenson. New MRI shows continued deterioration of lower spine. I am now being referred to Dr. Stovall for more precise dx using injections. It is very hard for me to walk any distance now and I do so with a limp. Would you be willing to sign an application for a temporary handicapped parking placard? I can ask Dr. Stevenson however her practice is very limited and only open 3 days per week. It may be a while before she can get it done. If you are willing, I can drop off the form for your signature as soon as I locate and fill out the form. Thank you, PEB

## 2019-12-20 ENCOUNTER — TELEPHONE (OUTPATIENT)
Dept: INTERNAL MEDICINE | Facility: CLINIC | Age: 74
End: 2019-12-20

## 2019-12-20 NOTE — TELEPHONE ENCOUNTER
PATIENT IS NEEDING TO HAVE HER DISABILITY LICENSE PLATE PAPERWORK SIGNED. DR THRASHER STATED THAT SHE WOULD HAVE IT SIGNED TODAY FOR HER. SHE HAS DROPPED IT OFF IN THE FRONT. SHE IS WAITING IN CLINIC TO HAVE PAPER SIGNED

## 2019-12-20 NOTE — TELEPHONE ENCOUNTER
Dr Bird, I found her handicap parking paper that needs to be signed in your box up front. I put it in your yellow folder. Please advise when to let the patient know that it will be ready for her to pick it up.

## 2020-01-22 ENCOUNTER — OFFICE VISIT (OUTPATIENT)
Dept: INTERNAL MEDICINE | Facility: CLINIC | Age: 75
End: 2020-01-22

## 2020-01-22 VITALS
OXYGEN SATURATION: 97 % | DIASTOLIC BLOOD PRESSURE: 70 MMHG | HEIGHT: 65 IN | HEART RATE: 92 BPM | SYSTOLIC BLOOD PRESSURE: 114 MMHG | BODY MASS INDEX: 21.76 KG/M2 | WEIGHT: 130.6 LBS

## 2020-01-22 DIAGNOSIS — J30.89 NON-SEASONAL ALLERGIC RHINITIS DUE TO OTHER ALLERGIC TRIGGER: ICD-10-CM

## 2020-01-22 DIAGNOSIS — M54.50 CHRONIC RIGHT-SIDED LOW BACK PAIN WITHOUT SCIATICA: Primary | ICD-10-CM

## 2020-01-22 DIAGNOSIS — M81.0 POSTMENOPAUSAL OSTEOPOROSIS: ICD-10-CM

## 2020-01-22 DIAGNOSIS — G89.29 CHRONIC RIGHT-SIDED LOW BACK PAIN WITHOUT SCIATICA: Primary | ICD-10-CM

## 2020-01-22 PROCEDURE — 99213 OFFICE O/P EST LOW 20 MIN: CPT | Performed by: INTERNAL MEDICINE

## 2020-01-22 RX ORDER — MELOXICAM 7.5 MG/1
7.5 TABLET ORAL DAILY
Qty: 30 TABLET | Refills: 2 | Status: SHIPPED | OUTPATIENT
Start: 2020-01-22 | End: 2020-05-18

## 2020-01-22 RX ORDER — FLUTICASONE PROPIONATE 50 MCG
1 SPRAY, SUSPENSION (ML) NASAL DAILY
Qty: 1 BOTTLE | Refills: 5 | Status: SHIPPED | OUTPATIENT
Start: 2020-01-22 | End: 2020-09-23 | Stop reason: SDUPTHER

## 2020-01-22 RX ORDER — ALENDRONATE SODIUM 70 MG/1
70 TABLET ORAL
Qty: 12 TABLET | Refills: 3 | Status: SHIPPED | OUTPATIENT
Start: 2020-01-22 | End: 2020-03-27

## 2020-01-22 RX ORDER — DESLORATADINE 5 MG/1
5 TABLET ORAL DAILY
Qty: 90 TABLET | Refills: 1 | Status: SHIPPED | OUTPATIENT
Start: 2020-01-22 | End: 2020-05-18

## 2020-01-22 NOTE — PROGRESS NOTES
dexa results    Subjective   Maura Barry is a 74 y.o. female is here today for follow-up.    History of Present Illness   Pat is here for a f/u on her recent dexa scan showing Osteoporosis.  Persisting back pain, Dr. Stevenson referred her to Dr. Real.   Trouble walking , bending. Started on meloxicam by Dr. Stevenson- would like refills.    Current Outpatient Medications:   •  ALLERGY SERUM INJECTION, Inject  under the skin 1 (One) Time Per Week., Disp: , Rfl:   •  Ascorbic Acid (VITAMIN C) 500 MG capsule, Take 1,000 mg by mouth Daily., Disp: , Rfl:   •  buPROPion XL (WELLBUTRIN XL) 300 MG 24 hr tablet, Take 1 tablet by mouth Daily., Disp: , Rfl: 2  •  Calcium-Magnesium (CALCIUM MAGNESIUM 750) 300-300 MG tablet, Take 3 tablets by mouth Daily., Disp: , Rfl:   •  Cholecalciferol (VITAMIN D3) 5000 units capsule capsule, Take 5,000 Units by mouth Daily., Disp: , Rfl:   •  Cyanocobalamin 1000 MCG sublingual tablet, Place 1 tablet under the tongue Daily., Disp: 30 each, Rfl: 5  •  desloratadine (CLARINEX) 5 MG tablet, Take 1 tablet by mouth Daily., Disp: 90 tablet, Rfl: 1  •  FLUAD 0.5 ML suspension prefilled syringe, 1 (One) Time., Disp: , Rfl: 0  •  fluticasone (FLONASE) 50 MCG/ACT nasal spray, 1 spray into the nostril(s) as directed by provider Daily., Disp: 1 bottle, Rfl: 5  •  Glucosamine HCl 1000 MG tablet, Take 2,000 mg by mouth Daily., Disp: , Rfl:   •  magnesium oxide (MAGOX) 400 (241.3 Mg) MG tablet tablet, Take 400 mg by mouth Daily., Disp: , Rfl:   •  memantine (NAMENDA) 10 MG tablet, Take 10 mg by mouth 2 (Two) Times a Day., Disp: , Rfl:   •  Omega-3 Fatty Acids (FISH OIL) 1000 MG capsule capsule, Take 1,000 mg by mouth Daily With Breakfast., Disp: , Rfl:   •  pantoprazole (PROTONIX) 40 MG EC tablet, Take 1 tablet by mouth Daily., Disp: 90 tablet, Rfl: 3  •  pseudoephedrine (SUDAFED 12 HOUR) 120 MG 12 hr tablet, Take 1 tablet by mouth Every 12 (Twelve) Hours., Disp: 60 tablet, Rfl: 5  •  raNITIdine (ZANTAC)  "300 MG tablet, Take 1 tablet by mouth Every Night., Disp: 30 tablet, Rfl: 5  •  traZODone (DESYREL) 50 MG tablet, Take 0.5 tablets by mouth Every Night., Disp: 90 tablet, Rfl: 3  •  alendronate (FOSAMAX) 70 MG tablet, Take 1 tablet by mouth Every 7 (Seven) Days., Disp: 12 tablet, Rfl: 3  •  azithromycin (ZITHROMAX Z-MARIN) 250 MG tablet, Take 2 tablets the first day, then 1 tablet daily for 4 days., Disp: 6 tablet, Rfl: 0  •  meloxicam (MOBIC) 7.5 MG tablet, Take 1 tablet by mouth Daily., Disp: 30 tablet, Rfl: 2      The following portions of the patient's history were reviewed and updated as appropriate: allergies, current medications, past family history, past medical history, past social history, past surgical history and problem list.    Review of Systems   Constitutional: Negative.  Negative for chills and fever.   HENT: Negative for ear discharge, ear pain, sinus pressure and sore throat.    Respiratory: Negative for cough, chest tightness and shortness of breath.    Cardiovascular: Negative for chest pain, palpitations and leg swelling.   Gastrointestinal: Negative for diarrhea, nausea and vomiting.   Musculoskeletal: Positive for arthralgias and back pain. Negative for myalgias.   Neurological: Negative for dizziness, syncope and headaches.   Psychiatric/Behavioral: Negative for confusion and sleep disturbance.       Objective   /70   Pulse 92   Ht 165.1 cm (65\")   Wt 59.2 kg (130 lb 9.6 oz)   SpO2 97% Comment: ra  BMI 21.73 kg/m²   Physical Exam   Constitutional: She is oriented to person, place, and time. She appears well-developed and well-nourished.   HENT:   Head: Normocephalic and atraumatic.   Eyes: Pupils are equal, round, and reactive to light. EOM are normal.   Cardiovascular: Normal rate and regular rhythm.   Pulmonary/Chest: Effort normal and breath sounds normal.   Musculoskeletal: Normal range of motion. She exhibits tenderness (back).   Neurological: She is alert and oriented to " person, place, and time.   Psychiatric: She has a normal mood and affect. Judgment normal.         Results for orders placed or performed in visit on 09/18/19   Cyclic Citrul Peptide Antibody, IgG / IgA   Result Value Ref Range    CCP Antibodies IgG/IgA 6 0 - 19 units   Sedimentation Rate   Result Value Ref Range    Sed Rate 10 0 - 30 mm/hr   C-reactive Protein   Result Value Ref Range    C-Reactive Protein 0.28 0.00 - 0.50 mg/dL   Ferritin   Result Value Ref Range    Ferritin 112.00 13.00 - 150.00 ng/mL   Iron Profile   Result Value Ref Range    TIBC 392 mcg/dL    UIBC 278 112 - 346 mcg/dL    Iron 114 37 - 145 mcg/dL    Iron Saturation 29 20 - 50 %             Assessment/Plan   Diagnoses and all orders for this visit:    Chronic right-sided low back pain without sciatica  -     fluticasone (FLONASE) 50 MCG/ACT nasal spray; 1 spray into the nostril(s) as directed by provider Daily.  -     meloxicam (MOBIC) 7.5 MG tablet; Take 1 tablet by mouth Daily.    Non-seasonal allergic rhinitis due to other allergic trigger  -     desloratadine (CLARINEX) 5 MG tablet; Take 1 tablet by mouth Daily.    Postmenopausal osteoporosis  -     alendronate (FOSAMAX) 70 MG tablet; Take 1 tablet by mouth Every 7 (Seven) Days.  -     Ambulatory Referral to [facility] For Infusion Treatment    Addendum 3/15/2020-patient requesting alternative treatment for her osteoporosis as Fosamax was making her acid issues worse.  Increasing heartburn and GI intolerance.  Advised we will place a consult for Prolia, patient agreeable.             Return for Next scheduled follow up.

## 2020-01-27 DIAGNOSIS — J30.89 NON-SEASONAL ALLERGIC RHINITIS DUE TO OTHER ALLERGIC TRIGGER: ICD-10-CM

## 2020-01-27 RX ORDER — DESLORATADINE 5 MG/1
5 TABLET ORAL DAILY
Qty: 90 TABLET | Refills: 1 | OUTPATIENT
Start: 2020-01-27

## 2020-03-27 ENCOUNTER — TELEPHONE (OUTPATIENT)
Dept: INTERNAL MEDICINE | Facility: CLINIC | Age: 75
End: 2020-03-27

## 2020-03-27 DIAGNOSIS — M81.0 POSTMENOPAUSAL OSTEOPOROSIS: Primary | ICD-10-CM

## 2020-03-31 ENCOUNTER — HOSPITAL ENCOUNTER (OUTPATIENT)
Dept: ONCOLOGY | Facility: HOSPITAL | Age: 75
Setting detail: INFUSION SERIES
Discharge: HOME | End: 2020-03-31

## 2020-04-01 ENCOUNTER — TELEPHONE (OUTPATIENT)
Dept: INTERNAL MEDICINE | Facility: CLINIC | Age: 75
End: 2020-04-01

## 2020-04-01 DIAGNOSIS — M81.0 POSTMENOPAUSAL OSTEOPOROSIS: Primary | ICD-10-CM

## 2020-04-01 RX ORDER — ZOLEDRONIC ACID 0.04 MG/ML
4 INJECTION, SOLUTION INTRAVENOUS ONCE
Qty: 100 ML | Refills: 0
Start: 2020-04-01 | End: 2020-04-01

## 2020-04-01 NOTE — TELEPHONE ENCOUNTER
----- Message from Geena Gonzlaez sent at 4/1/2020 11:54 AM EDT -----  Good morning. We are attempting to have prolia authorized for your patient, Maura Barry. However, [employer] has denied this. [employer] follows a step therapy policy and zometa must be first line therapy. Zometa can be approved for her but we will need a new order. You may either enter into epic or fax to [phone]. Thank you so much for your help.  Geena

## 2020-04-06 PROBLEM — M81.0 MENOPAUSAL OSTEOPOROSIS: Status: ACTIVE | Noted: 2020-04-06

## 2020-04-07 ENCOUNTER — HOSPITAL ENCOUNTER (OUTPATIENT)
Dept: ONCOLOGY | Facility: HOSPITAL | Age: 75
Setting detail: INFUSION SERIES
Discharge: HOME | End: 2020-04-07

## 2020-04-07 VITALS
TEMPERATURE: 97.7 F | WEIGHT: 132 LBS | HEIGHT: 65 IN | DIASTOLIC BLOOD PRESSURE: 50 MMHG | RESPIRATION RATE: 20 BRPM | HEART RATE: 105 BPM | BODY MASS INDEX: 21.99 KG/M2 | SYSTOLIC BLOOD PRESSURE: 94 MMHG

## 2020-04-07 DIAGNOSIS — M81.0 MENOPAUSAL OSTEOPOROSIS: Primary | ICD-10-CM

## 2020-04-07 PROCEDURE — 96374 THER/PROPH/DIAG INJ IV PUSH: CPT

## 2020-04-07 PROCEDURE — 25010000002 ZOLEDRONIC ACID PER 1 MG: Performed by: INTERNAL MEDICINE

## 2020-04-07 PROCEDURE — 82565 ASSAY OF CREATININE: CPT

## 2020-04-07 PROCEDURE — 96375 TX/PRO/DX INJ NEW DRUG ADDON: CPT

## 2020-04-07 RX ORDER — SODIUM CHLORIDE 9 MG/ML
250 INJECTION, SOLUTION INTRAVENOUS ONCE
Status: DISCONTINUED | OUTPATIENT
Start: 2020-04-07 | End: 2020-04-08 | Stop reason: HOSPADM

## 2020-04-07 RX ORDER — SODIUM CHLORIDE 9 MG/ML
250 INJECTION, SOLUTION INTRAVENOUS ONCE
Status: CANCELLED | OUTPATIENT
Start: 2020-04-07

## 2020-04-07 RX ADMIN — ZOLEDRONIC ACID 4 MG: 4 INJECTION, SOLUTION, CONCENTRATE INTRAVENOUS at 14:17

## 2020-04-08 LAB — CREAT BLDA-MCNC: 0.9 MG/DL (ref 0.6–1.3)

## 2020-04-28 ENCOUNTER — PATIENT MESSAGE (OUTPATIENT)
Dept: INTERNAL MEDICINE | Facility: CLINIC | Age: 75
End: 2020-04-28

## 2020-04-28 RX ORDER — TRAZODONE HYDROCHLORIDE 50 MG/1
100 TABLET ORAL NIGHTLY
Qty: 180 TABLET | Refills: 1 | Status: SHIPPED | OUTPATIENT
Start: 2020-04-28 | End: 2020-09-23 | Stop reason: SDUPTHER

## 2020-05-14 ENCOUNTER — TELEPHONE (OUTPATIENT)
Dept: INTERNAL MEDICINE | Facility: CLINIC | Age: 75
End: 2020-05-14

## 2020-05-15 NOTE — TELEPHONE ENCOUNTER
----- Message from Maura Barry sent at 5/14/2020  5:25 PM EDT -----  Regarding: Non-Urgent Medical Question  Contact: [phone]  Dr. MARIAMA MAYO just scheduled an appt with you, via [facility], for your first available. It is on May 26. I have a very inflamed vaginal area and was hoping to get in sooner. It interferes with my ability to sit. Is there any way to get in sooner? Thank you, Portia Barry

## 2020-05-15 NOTE — TELEPHONE ENCOUNTER
Yohana, Can you reschedule her appointment to Monday the 18th at 8.15 am?  I have notified her via [employer] .    thanks

## 2020-05-18 ENCOUNTER — APPOINTMENT (OUTPATIENT)
Dept: LAB | Facility: HOSPITAL | Age: 75
End: 2020-05-18

## 2020-05-18 ENCOUNTER — OFFICE VISIT (OUTPATIENT)
Dept: INTERNAL MEDICINE | Facility: CLINIC | Age: 75
End: 2020-05-18

## 2020-05-18 VITALS
DIASTOLIC BLOOD PRESSURE: 64 MMHG | BODY MASS INDEX: 22.13 KG/M2 | SYSTOLIC BLOOD PRESSURE: 110 MMHG | OXYGEN SATURATION: 98 % | HEIGHT: 65 IN | HEART RATE: 85 BPM | WEIGHT: 132.8 LBS

## 2020-05-18 DIAGNOSIS — G89.29 CHRONIC RIGHT SHOULDER PAIN: ICD-10-CM

## 2020-05-18 DIAGNOSIS — K21.9 GASTROESOPHAGEAL REFLUX DISEASE WITHOUT ESOPHAGITIS: ICD-10-CM

## 2020-05-18 DIAGNOSIS — E78.5 DYSLIPIDEMIA: ICD-10-CM

## 2020-05-18 DIAGNOSIS — M54.2 CERVICALGIA: ICD-10-CM

## 2020-05-18 DIAGNOSIS — E55.9 VITAMIN D DEFICIENCY: ICD-10-CM

## 2020-05-18 DIAGNOSIS — M25.511 CHRONIC RIGHT SHOULDER PAIN: ICD-10-CM

## 2020-05-18 DIAGNOSIS — N95.2 ATROPHIC VAGINITIS: Primary | ICD-10-CM

## 2020-05-18 DIAGNOSIS — E53.8 B12 DEFICIENCY: ICD-10-CM

## 2020-05-18 PROCEDURE — 99214 OFFICE O/P EST MOD 30 MIN: CPT | Performed by: INTERNAL MEDICINE

## 2020-05-18 RX ORDER — VILAZODONE HYDROCHLORIDE 20 MG/1
1 TABLET ORAL DAILY
COMMUNITY
Start: 2020-03-29 | End: 2023-01-06

## 2020-05-18 RX ORDER — ESTRADIOL 10 UG/1
1 INSERT VAGINAL 2 TIMES WEEKLY
Qty: 8 TABLET | Refills: 5 | Status: SHIPPED | OUTPATIENT
Start: 2020-05-18 | End: 2020-07-29

## 2020-05-18 RX ORDER — FAMOTIDINE 40 MG/1
40 TABLET, FILM COATED ORAL DAILY
Qty: 30 TABLET | Refills: 2 | Status: SHIPPED | OUTPATIENT
Start: 2020-05-18 | End: 2020-09-23 | Stop reason: SDUPTHER

## 2020-05-18 NOTE — PROGRESS NOTES
Vaginal inflmation    Subjective   Maura Barry is a 74 y.o. female is here today for follow-up.    History of Present Illness   Presents for an OV, wioth the c/o vaginal inflammation for several years, on and off, and has been more painful and irritated.  Has previously seen pelvic floor rehab, thought due to scar from episiotomy.  Got a ilio psoas shot from Dr. Real.  Rt shoulder pain x several mos, trouble lifting, has not tried PT for it before. Would like to go to [place].        Current Outpatient Medications:   •  ALLERGY SERUM INJECTION, Inject  under the skin 1 (One) Time Per Week., Disp: , Rfl:   •  Ascorbic Acid (VITAMIN C) 500 MG capsule, Take 1,000 mg by mouth Daily., Disp: , Rfl:   •  buPROPion XL (WELLBUTRIN XL) 300 MG 24 hr tablet, Take 1 tablet by mouth Daily., Disp: , Rfl: 2  •  Calcium-Magnesium (CALCIUM MAGNESIUM 750) 300-300 MG tablet, Take 3 tablets by mouth Daily., Disp: , Rfl:   •  Cholecalciferol (VITAMIN D3) 5000 units capsule capsule, Take 5,000 Units by mouth Daily., Disp: , Rfl:   •  Cyanocobalamin 1000 MCG sublingual tablet, Place 1 tablet under the tongue Daily., Disp: 30 each, Rfl: 5  •  fluticasone (FLONASE) 50 MCG/ACT nasal spray, 1 spray into the nostril(s) as directed by provider Daily., Disp: 1 bottle, Rfl: 5  •  Glucosamine HCl 1000 MG tablet, Take 2,000 mg by mouth Daily., Disp: , Rfl:   •  magnesium oxide (MAGOX) 400 (241.3 Mg) MG tablet tablet, Take 400 mg by mouth Daily., Disp: , Rfl:   •  memantine (NAMENDA) 10 MG tablet, Take 10 mg by mouth 2 (Two) Times a Day., Disp: , Rfl:   •  Omega-3 Fatty Acids (FISH OIL) 1000 MG capsule capsule, Take 1,000 mg by mouth Daily With Breakfast., Disp: , Rfl:   •  pantoprazole (PROTONIX) 40 MG EC tablet, Take 1 tablet by mouth Daily., Disp: 90 tablet, Rfl: 3  •  pseudoephedrine (SUDAFED 12 HOUR) 120 MG 12 hr tablet, Take 1 tablet by mouth Every 12 (Twelve) Hours., Disp: 60 tablet, Rfl: 5  •  traZODone (DESYREL) 50 MG tablet, Take 2  "tablets by mouth Every Night., Disp: 180 tablet, Rfl: 1  •  VIIBRYD 20 MG tablet tablet, Take 1 tablet by mouth Daily., Disp: , Rfl:   •  estradiol (VAGIFEM) 10 MCG tablet vaginal tablet, Insert 1 tablet into the vagina 2 (Two) Times a Week., Disp: 8 tablet, Rfl: 5  •  famotidine (PEPCID) 40 MG tablet, Take 1 tablet by mouth Daily., Disp: 30 tablet, Rfl: 2      The following portions of the patient's history were reviewed and updated as appropriate: allergies, current medications, past family history, past medical history, past social history, past surgical history and problem list.    Review of Systems   Constitutional: Negative for chills and fever.   HENT: Negative for sore throat.    Gastrointestinal: Negative for abdominal pain, constipation, diarrhea, nausea and vomiting.   Genitourinary: Positive for flank pain and urgency. Negative for dysuria, frequency, hematuria, pelvic pain and vaginal discharge.   Musculoskeletal: Positive for back pain.   Skin: Negative for rash.   Neurological: Negative for headaches.       Objective   /64   Pulse 85   Ht 165.1 cm (65\")   Wt 60.2 kg (132 lb 12.8 oz)   SpO2 98% Comment: ra  BMI 22.10 kg/m²   Physical Exam   Constitutional: She is oriented to person, place, and time. She appears well-developed and well-nourished.   HENT:   Head: Normocephalic and atraumatic.   Eyes: Pupils are equal, round, and reactive to light. EOM are normal. No scleral icterus.   Neck: Normal range of motion. Neck supple.   Pulmonary/Chest: Effort normal. No respiratory distress.   Genitourinary: Uterus normal. No vaginal discharge found.   Genitourinary Comments: Vaginal atrophy   Musculoskeletal: She exhibits tenderness (rt superior trap muscle margin).   Neurological: She is alert and oriented to person, place, and time.   Psychiatric: She has a normal mood and affect. Judgment normal.         Results for orders placed or performed during the hospital encounter of 04/07/20   POC " Creatinine   Result Value Ref Range    Creatinine 0.90 0.60 - 1.30 mg/dL             Assessment/Plan   Diagnoses and all orders for this visit:    Atrophic vaginitis  -     Ambulatory Referral to Physical Therapy Evaluate and treat  -     estradiol (VAGIFEM) 10 MCG tablet vaginal tablet; Insert 1 tablet into the vagina 2 (Two) Times a Week.  -     NuSwab Vaginitis (VG) - Swab, Vagina    Chronic right shoulder pain  -     Ambulatory Referral to Physical Therapy Evaluate and treat    Cervicalgia  -     Ambulatory Referral to Physical Therapy Evaluate and treat    Gastroesophageal reflux disease without esophagitis  -     famotidine (PEPCID) 40 MG tablet; Take 1 tablet by mouth Daily.    Other orders  -     VIIBRYD 20 MG tablet tablet; Take 1 tablet by mouth Daily.      Check vaginosis panel, start vagifem empirically.           Return for Next scheduled follow up.  Answers for HPI/ROS submitted by the patient on 5/16/2020   Female genitourinary complaint  What is the primary reason for your visit?: Vaginal Discharge/Irritation

## 2020-05-31 LAB
A VAGINAE DNA VAG QL NAA+PROBE: NORMAL SCORE
BVAB2 DNA VAG QL NAA+PROBE: NORMAL SCORE
C ALBICANS DNA VAG QL NAA+PROBE: NEGATIVE
C GLABRATA DNA VAG QL NAA+PROBE: NEGATIVE
MEGA1 DNA VAG QL NAA+PROBE: NORMAL SCORE
T VAGINALIS DNA VAG QL NAA+PROBE: NEGATIVE

## 2020-08-12 ENCOUNTER — TELEMEDICINE (OUTPATIENT)
Dept: INTERNAL MEDICINE | Facility: CLINIC | Age: 75
End: 2020-08-12

## 2020-08-12 DIAGNOSIS — J30.1 SEASONAL ALLERGIC RHINITIS DUE TO POLLEN: Primary | ICD-10-CM

## 2020-08-12 PROCEDURE — 99213 OFFICE O/P EST LOW 20 MIN: CPT | Performed by: NURSE PRACTITIONER

## 2020-08-12 RX ORDER — PSEUDOEPHEDRINE HCL 120 MG/1
120 TABLET, FILM COATED, EXTENDED RELEASE ORAL EVERY 12 HOURS
Qty: 60 TABLET | Refills: 5 | Status: SHIPPED | OUTPATIENT
Start: 2020-08-12 | End: 2021-09-07

## 2020-08-12 RX ORDER — PREDNISONE 10 MG/1
TABLET ORAL
Qty: 48 TABLET | Refills: 0 | Status: SHIPPED | OUTPATIENT
Start: 2020-08-12 | End: 2020-09-23

## 2020-08-12 RX ORDER — BENZONATATE 200 MG/1
200 CAPSULE ORAL 3 TIMES DAILY PRN
Qty: 20 CAPSULE | Refills: 0 | Status: SHIPPED | OUTPATIENT
Start: 2020-08-12 | End: 2020-09-23

## 2020-08-12 NOTE — PROGRESS NOTES
Subjective     Maura Barry is a 75 y.o. female here today for Allergic Rhinitis (Been taking OTC and no relief)        I have reviewed the following portions of the patient's history and confirmed they are accurate: allergies, current medications, past family history, past medical history, past social history, past surgical history and problem list    I have personally completed the patient's review of systems.  You have chosen to receive care through a telehealth visit.  Do you consent to use a video/audio connection for your medical care today? Yes    Allergic Rhinitis   Presents for follow-up visit. She complains of cough, itchy nose and sneezing. She reports no congestion, fever, rash or rhinorrhea. The problem occurs daily. Timing of symptoms is constant. Symptom severity has been moderate. The treatment provided mild relief. Compliance with medications is %. There are no compliance problems.        Review of Systems   Constitutional: Negative for chills and fever.   HENT: Positive for postnasal drip and sneezing. Negative for congestion and rhinorrhea.    Eyes: Negative for blurred vision and visual disturbance.   Respiratory: Positive for cough.    Cardiovascular: Negative for chest pain, palpitations and leg swelling.   Gastrointestinal: Negative for abdominal pain and nausea.   Skin: Negative for rash.   Allergic/Immunologic: Negative for immunocompromised state.   Neurological: Negative for headache.   Hematological: Negative for adenopathy.       Current Outpatient Medications on File Prior to Visit   Medication Sig   • albuterol sulfate  (90 Base) MCG/ACT inhaler Inhale 2 puffs Every 4 (Four) Hours As Needed for Wheezing for up to 30 days.   • ALLERGY SERUM INJECTION Inject  under the skin 1 (One) Time Per Week.   • Ascorbic Acid (VITAMIN C) 500 MG capsule Take 1,000 mg by mouth Daily.   • buPROPion XL (WELLBUTRIN XL) 300 MG 24 hr tablet Take 1 tablet by mouth Daily.   •  Calcium-Magnesium (CALCIUM MAGNESIUM 750) 300-300 MG tablet Take 3 tablets by mouth Daily.   • Cholecalciferol (VITAMIN D3) 5000 units capsule capsule Take 5,000 Units by mouth Daily.   • Cyanocobalamin 1000 MCG sublingual tablet Place 1 tablet under the tongue Daily.   • DICLOFENAC PO Take  by mouth.   • famotidine (PEPCID) 40 MG tablet Take 1 tablet by mouth Daily.   • fluticasone (FLONASE) 50 MCG/ACT nasal spray 1 spray into the nostril(s) as directed by provider Daily.   • Glucosamine HCl 1000 MG tablet Take 2,000 mg by mouth Daily.   • magnesium oxide (MAGOX) 400 (241.3 Mg) MG tablet tablet Take 400 mg by mouth Daily.   • memantine (NAMENDA) 10 MG tablet Take 10 mg by mouth 2 (Two) Times a Day.   • Omega-3 Fatty Acids (FISH OIL) 1000 MG capsule capsule Take 1,000 mg by mouth Daily With Breakfast.   • pantoprazole (PROTONIX) 40 MG EC tablet Take 1 tablet by mouth Daily.   • traZODone (DESYREL) 50 MG tablet Take 2 tablets by mouth Every Night.   • VIIBRYD 20 MG tablet tablet Take 1 tablet by mouth Daily.   • zoledronic acid (ZOMETA) 4 MG/100ML solution infusion (premix) Infuse 4 mg into a venous catheter 1 (One) Time.   • [DISCONTINUED] azithromycin (ZITHROMAX) 250 MG tablet Take 2 tablets the first day, then 1 tablet daily for 4 days.   • [DISCONTINUED] methylPREDNISolone (MEDROL, MARIN,) 4 MG tablet Take as directed on package instructions.   • [DISCONTINUED] pseudoephedrine (SUDAFED 12 HOUR) 120 MG 12 hr tablet Take 1 tablet by mouth Every 12 (Twelve) Hours.     No current facility-administered medications on file prior to visit.        Objective   There were no vitals filed for this visit.  There is no height or weight on file to calculate BMI.    Physical Exam   Constitutional: She appears well-developed and well-nourished. No distress.   HENT:   Head: Normocephalic and atraumatic.   Eyes: Conjunctivae and EOM are normal.   Neck: Neck normal appearance.  Pulmonary/Chest: Effort normal.   Dry cough      Musculoskeletal: Normal range of motion.   Neurological: She is alert.   Skin: She is not diaphoretic.   Psychiatric: She has a normal mood and affect. She mood appears normal. Her affect is normal. Her behavior is normal. Thought content is normal. She does not express abnormal judgement.   Vitals reviewed.      Assessment/Plan   Problems Addressed this Visit     None      Visit Diagnoses     Seasonal allergic rhinitis due to pollen    -  Primary    Take medications as prescribed along with daily allergy meds. F/U as needed.    Relevant Medications    benzonatate (TESSALON) 200 MG capsule    predniSONE (DELTASONE) 10 MG (48) tablet pack    pseudoephedrine (Sudafed 12 Hour) 120 MG 12 hr tablet             Current Outpatient Medications:   •  albuterol sulfate  (90 Base) MCG/ACT inhaler, Inhale 2 puffs Every 4 (Four) Hours As Needed for Wheezing for up to 30 days., Disp: 6.7 g, Rfl: 0  •  ALLERGY SERUM INJECTION, Inject  under the skin 1 (One) Time Per Week., Disp: , Rfl:   •  Ascorbic Acid (VITAMIN C) 500 MG capsule, Take 1,000 mg by mouth Daily., Disp: , Rfl:   •  benzonatate (TESSALON) 200 MG capsule, Take 1 capsule by mouth 3 (Three) Times a Day As Needed for Cough., Disp: 20 capsule, Rfl: 0  •  buPROPion XL (WELLBUTRIN XL) 300 MG 24 hr tablet, Take 1 tablet by mouth Daily., Disp: , Rfl: 2  •  Calcium-Magnesium (CALCIUM MAGNESIUM 750) 300-300 MG tablet, Take 3 tablets by mouth Daily., Disp: , Rfl:   •  Cholecalciferol (VITAMIN D3) 5000 units capsule capsule, Take 5,000 Units by mouth Daily., Disp: , Rfl:   •  Cyanocobalamin 1000 MCG sublingual tablet, Place 1 tablet under the tongue Daily., Disp: 30 each, Rfl: 5  •  DICLOFENAC PO, Take  by mouth., Disp: , Rfl:   •  famotidine (PEPCID) 40 MG tablet, Take 1 tablet by mouth Daily., Disp: 30 tablet, Rfl: 2  •  fluticasone (FLONASE) 50 MCG/ACT nasal spray, 1 spray into the nostril(s) as directed by provider Daily., Disp: 1 bottle, Rfl: 5  •  Glucosamine HCl  1000 MG tablet, Take 2,000 mg by mouth Daily., Disp: , Rfl:   •  magnesium oxide (MAGOX) 400 (241.3 Mg) MG tablet tablet, Take 400 mg by mouth Daily., Disp: , Rfl:   •  memantine (NAMENDA) 10 MG tablet, Take 10 mg by mouth 2 (Two) Times a Day., Disp: , Rfl:   •  Omega-3 Fatty Acids (FISH OIL) 1000 MG capsule capsule, Take 1,000 mg by mouth Daily With Breakfast., Disp: , Rfl:   •  pantoprazole (PROTONIX) 40 MG EC tablet, Take 1 tablet by mouth Daily., Disp: 90 tablet, Rfl: 3  •  predniSONE (DELTASONE) 10 MG (48) tablet pack, Take medication as prescribed, Disp: 48 tablet, Rfl: 0  •  pseudoephedrine (Sudafed 12 Hour) 120 MG 12 hr tablet, Take 1 tablet by mouth Every 12 (Twelve) Hours., Disp: 60 tablet, Rfl: 5  •  traZODone (DESYREL) 50 MG tablet, Take 2 tablets by mouth Every Night., Disp: 180 tablet, Rfl: 1  •  VIIBRYD 20 MG tablet tablet, Take 1 tablet by mouth Daily., Disp: , Rfl:   •  zoledronic acid (ZOMETA) 4 MG/100ML solution infusion (premix), Infuse 4 mg into a venous catheter 1 (One) Time., Disp: , Rfl:        Plan of care reviewed with the patient at the conclusion of today's visit.  Education was provided regarding diagnosis, management, and any prescribed or recommended OTC medications.  Patient verbalized understanding of and agreement with management plan.    This was an audio and video enabled telemedicine encounter.     Return if symptoms worsen or fail to improve.      REGGIE Flores

## 2020-09-17 ENCOUNTER — TELEMEDICINE (OUTPATIENT)
Dept: INTERNAL MEDICINE | Facility: CLINIC | Age: 75
End: 2020-09-17

## 2020-09-17 ENCOUNTER — TELEPHONE (OUTPATIENT)
Dept: INTERNAL MEDICINE | Facility: CLINIC | Age: 75
End: 2020-09-17

## 2020-09-17 DIAGNOSIS — J30.2 SEASONAL ALLERGIC RHINITIS, UNSPECIFIED TRIGGER: Primary | ICD-10-CM

## 2020-09-17 PROCEDURE — 99213 OFFICE O/P EST LOW 20 MIN: CPT | Performed by: NURSE PRACTITIONER

## 2020-09-17 RX ORDER — AZELASTINE 1 MG/ML
2 SPRAY, METERED NASAL DAILY
Qty: 1 EACH | Refills: 2 | Status: SHIPPED | OUTPATIENT
Start: 2020-09-17 | End: 2020-12-08

## 2020-09-17 RX ORDER — DEXTROMETHORPHAN HYDROBROMIDE AND PROMETHAZINE HYDROCHLORIDE 15; 6.25 MG/5ML; MG/5ML
5 SYRUP ORAL 4 TIMES DAILY PRN
Qty: 240 ML | Refills: 0 | Status: SHIPPED | OUTPATIENT
Start: 2020-09-17 | End: 2020-09-23 | Stop reason: SDUPTHER

## 2020-09-17 RX ORDER — MONTELUKAST SODIUM 10 MG/1
10 TABLET ORAL NIGHTLY
Qty: 30 TABLET | Refills: 2 | Status: SHIPPED | OUTPATIENT
Start: 2020-09-17 | End: 2020-12-08

## 2020-09-17 NOTE — PROGRESS NOTES
Chief Complaint   Patient presents with   • Allergic Rhinitis       History of Present Illness    Maura Barry is a 75 y.o. female who presents today for an audio/video visit during the Covid-19 pandemic/federally declared state of public health emergency for allergic rhinitis. The use of a video visit has been reviewed with the patient and verbal informed consent has been obtained.    URI   This is a recurrent (Allergic rhinitis) problem. Episode onset: about 2 months ago. The problem has been gradually worsening. There has been no fever. Associated symptoms include coughing (productive, clear), headaches and rhinorrhea. Pertinent negatives include no abdominal pain, chest pain, congestion, diarrhea, dysuria, ear pain, joint pain, joint swelling, nausea, neck pain, plugged ear sensation, rash, sinus pain, sneezing, sore throat, swollen glands, vomiting or wheezing. Associated symptoms comments: (-) chills, myalgias, loss of taste or smell. (+) PND. She has tried antihistamine and decongestant (allery shots with allergist, prednisone x 2 rounds, sudafed, tessalon, cetirizine, fluticasone, ) for the symptoms. The treatment provided mild relief.   Denies sick contacts recent travel by plane or boat.  Has had 3 COVID tests performed, all negative.       Review of Systems  Review of Systems   Constitutional: Negative for appetite change, chills, diaphoresis, fatigue and fever.   HENT: Positive for postnasal drip and rhinorrhea. Negative for congestion, ear discharge, ear pain, facial swelling, nosebleeds, sinus pressure, sinus pain, sneezing, sore throat and trouble swallowing.    Eyes: Negative for pain, discharge, redness, itching and visual disturbance.   Respiratory: Positive for cough (productive, clear). Negative for chest tightness, shortness of breath and wheezing.    Cardiovascular: Negative for chest pain and palpitations.   Gastrointestinal: Negative for abdominal pain, constipation, diarrhea, nausea and  vomiting.   Genitourinary: Negative for dysuria.   Musculoskeletal: Negative for joint pain and neck pain.   Skin: Negative for color change and rash.   Neurological: Positive for headaches. Negative for dizziness, weakness and light-headedness.   Psychiatric/Behavioral: Positive for sleep disturbance.       [facility]    The following portions of the patient's history were reviewed and updated as appropriate: allergies, current medications, past family history, past medical history, past social history, past surgical history and problem list.     Social History     Tobacco Use   • Smoking status: Never Smoker   • Smokeless tobacco: Never Used   Substance Use Topics   • Alcohol use: Yes     Comment: 1 or less a week       Past Medical History:   Diagnosis Date   • Allergic    • Arthritis    • Backache    • Depression    • Depression    • Dysuria    • Neck pain    • Pelvic pain    • Vaginitis        Past Surgical History:   Procedure Laterality Date   • BLADDER SUSPENSION         Family History   Problem Relation Age of Onset   • Stroke Mother    • Arthritis Mother    • Mental illness Mother    • Osteoporosis Mother    • Alzheimer's disease Father    • Hyperlipidemia Father    • Asthma Brother    • Heart failure Brother    • Hypertension Brother    • Mental illness Brother    • Cancer Brother    • Heart attack Brother    • Liver disease Brother    • Breast cancer Neg Hx    • Ovarian cancer Neg Hx        Allergies   Allergen Reactions   • Penicillins Hives         Current Outpatient Medications:   •  ALLERGY SERUM INJECTION, Inject  under the skin 1 (One) Time Per Week., Disp: , Rfl:   •  Ascorbic Acid (VITAMIN C) 500 MG capsule, Take 1,000 mg by mouth Daily., Disp: , Rfl:   •  azelastine (ASTELIN) 0.1 % nasal spray, 2 sprays into the nostril(s) as directed by provider Daily. Use in each nostril as directed, Disp: 1 each, Rfl: 2  •  benzonatate (TESSALON) 200 MG capsule, Take 1 capsule by mouth 3 (Three) Times a Day As  Needed for Cough., Disp: 20 capsule, Rfl: 0  •  buPROPion XL (WELLBUTRIN XL) 300 MG 24 hr tablet, Take 1 tablet by mouth Daily., Disp: , Rfl: 2  •  Calcium-Magnesium (CALCIUM MAGNESIUM 750) 300-300 MG tablet, Take 3 tablets by mouth Daily., Disp: , Rfl:   •  Cholecalciferol (VITAMIN D3) 5000 units capsule capsule, Take 5,000 Units by mouth Daily., Disp: , Rfl:   •  Cyanocobalamin 1000 MCG sublingual tablet, Place 1 tablet under the tongue Daily., Disp: 30 each, Rfl: 5  •  DICLOFENAC PO, Take  by mouth., Disp: , Rfl:   •  famotidine (PEPCID) 40 MG tablet, Take 1 tablet by mouth Daily., Disp: 30 tablet, Rfl: 2  •  fluticasone (FLONASE) 50 MCG/ACT nasal spray, 1 spray into the nostril(s) as directed by provider Daily., Disp: 1 bottle, Rfl: 5  •  Glucosamine HCl 1000 MG tablet, Take 2,000 mg by mouth Daily., Disp: , Rfl:   •  magnesium oxide (MAGOX) 400 (241.3 Mg) MG tablet tablet, Take 400 mg by mouth Daily., Disp: , Rfl:   •  memantine (NAMENDA) 10 MG tablet, Take 10 mg by mouth 2 (Two) Times a Day., Disp: , Rfl:   •  montelukast (Singulair) 10 MG tablet, Take 1 tablet by mouth Every Night., Disp: 30 tablet, Rfl: 2  •  Omega-3 Fatty Acids (FISH OIL) 1000 MG capsule capsule, Take 1,000 mg by mouth Daily With Breakfast., Disp: , Rfl:   •  pantoprazole (PROTONIX) 40 MG EC tablet, Take 1 tablet by mouth Daily., Disp: 90 tablet, Rfl: 3  •  predniSONE (DELTASONE) 10 MG (48) tablet pack, Take medication as prescribed, Disp: 48 tablet, Rfl: 0  •  promethazine-dextromethorphan (PROMETHAZINE-DM) 6.25-15 MG/5ML syrup, Take 5 mL by mouth 4 (Four) Times a Day As Needed for Cough., Disp: 240 mL, Rfl: 0  •  pseudoephedrine (Sudafed 12 Hour) 120 MG 12 hr tablet, Take 1 tablet by mouth Every 12 (Twelve) Hours., Disp: 60 tablet, Rfl: 5  •  traZODone (DESYREL) 50 MG tablet, Take 2 tablets by mouth Every Night., Disp: 180 tablet, Rfl: 1  •  VIIBRYD 20 MG tablet tablet, Take 1 tablet by mouth Daily., Disp: , Rfl:   •  zoledronic acid  (ZOMETA) 4 MG/100ML solution infusion (premix), Infuse 4 mg into a venous catheter 1 (One) Time., Disp: , Rfl:     Vitals: Unable to obtain due to audio/video encounter  There were no vitals filed for this visit.    Physical Exam: Limited due to audio/video encounter  Physical Exam   Constitutional: She appears well-developed and well-nourished. She does not have a sickly appearance. She does not appear ill. No distress.   HENT:   Head: Normocephalic and atraumatic.   Right Ear: External ear normal.   Left Ear: External ear normal.   Nose: No rhinorrhea. Right sinus exhibits maxillary sinus tenderness. Right sinus exhibits no frontal sinus tenderness. Left sinus exhibits maxillary sinus tenderness. Left sinus exhibits no frontal sinus tenderness. no nasal tenderness.  Mouth/Throat: Mouth/Lips are normal.Oropharynx is clear and moist and mucous membranes are normal.   Pulmonary/Chest: Effort normal. No stridor.  No respiratory distress.  Lymphadenopathy:        Head (right side): No submental and no submandibular adenopathy present.        Head (left side): No submental and no submandibular adenopathy present.   Neurological: She is alert.   Skin: Skin is warm, dry and intact. No rash noted. Her skin appears normal.  Psychiatric: She has a normal mood and affect. Her speech is normal and behavior is normal.   Vitals reviewed.      Labs  None this visit    Assessment/Plan    Maura was seen today for allergic rhinitis.    Diagnoses and all orders for this visit:    Seasonal allergic rhinitis, unspecified trigger  -     montelukast (Singulair) 10 MG tablet; Take 1 tablet by mouth Every Night.  -     promethazine-dextromethorphan (PROMETHAZINE-DM) 6.25-15 MG/5ML syrup; Take 5 mL by mouth 4 (Four) Times a Day As Needed for Cough.  -     azelastine (ASTELIN) 0.1 % nasal spray; 2 sprays into the nostril(s) as directed by provider Daily. Use in each nostril as directed    Discussed allergy vs. viral vs. bacterial  etiology. Signs and symptoms consistent with persistent allergic rhinitis at this time. Advised in symptomatic relief with recommended OTC medications and/or prescribed medications this visit.  Will initiate him on telemetry class given severe persistent symptoms x2 months, switch fluticasone nasal glucocorticoid to Astelin as prescribed.  Patient education provided regarding medication dosing, frequency and technique including proper administration of nasal spray.  Encouraged avoidance of known allergens or suspected triggers of allergy symptoms. Encouraged rest and hydration.  Follow-up with allergist and PCP as indicated.    Today I have spent a total of 20 minutes on a telephone encounter with Maura Barry. During this time, a total of 20 minutes was spent in direct discussion with patient regarding pertinent diagnoses, treatment modalities, medications, and follow-up. Education includes verbal and written discussion on the nature of the diagnoses including treatment, complications, implications, and management. Indications for further evaluation and work-up provided. Patient was informed to notify office of any new, worsening, or persistent symptoms. Patient verbalized understanding and agreement with plan of care.     Follow-Up  Return in 6 days (on 9/23/2020) for F/U with PCP.      Electronically Signed By:  REGGIE Quintanilla/Transcription Disclaimer:  Please note that portions of this encounter note were completed using electronic transcription/translation of spoken language to printed text.  The electronic transcription/translation of spoken language may permit erroneous, or at times, nonsensical words or phrases to be inadvertently transcribed.  Although I have reviewed the note for such errors, some may still exist in this documentation.

## 2020-09-18 ENCOUNTER — TELEPHONE (OUTPATIENT)
Dept: INTERNAL MEDICINE | Facility: CLINIC | Age: 75
End: 2020-09-18

## 2020-09-18 NOTE — TELEPHONE ENCOUNTER
Caller: Maura Barry    Relationship: Self    Best call back number: [phone]    What orders are you requesting (i.e. lab or imaging): [facility] ON 9/23/20    In what timeframe would the patient need to come in: ASAP    Where will you receive your lab/imaging services:     Additional notes: Patient would like a call so that she can come in to get her labs drawn before 9/23/20 appointmennt.

## 2020-09-18 NOTE — TELEPHONE ENCOUNTER
----- Message from REGGIE Quintanilla sent at 9/17/2020  3:39 PM EDT -----  Patient with chronic allergic rhinitis, currently seeing allergist and receiving allergy injections.  Has been on a number of antihistamines, decongestants, glucocorticoid oral and nasal spray without relief.  Symptoms have not changed in nature but have been ongoing x2 months.  3 COVID tests negative.  Patient expressed concern that she would not be allowed to attend annual exam next week due to ongoing cough secondary to postnasal drainage.  I have assured patient that as her symptoms are not acute in nature, and no indication of COVID, she should not be denied access to the office and that I would inform you of ongoing allergy related symptoms.

## 2020-09-22 ENCOUNTER — LAB REQUISITION (OUTPATIENT)
Dept: LAB | Facility: HOSPITAL | Age: 75
End: 2020-09-22

## 2020-09-22 DIAGNOSIS — Z00.00 ROUTINE GENERAL MEDICAL EXAMINATION AT A HEALTH CARE FACILITY: ICD-10-CM

## 2020-09-22 PROCEDURE — 36415 COLL VENOUS BLD VENIPUNCTURE: CPT | Performed by: INTERNAL MEDICINE

## 2020-09-23 ENCOUNTER — OFFICE VISIT (OUTPATIENT)
Dept: INTERNAL MEDICINE | Facility: CLINIC | Age: 75
End: 2020-09-23

## 2020-09-23 VITALS
HEART RATE: 94 BPM | DIASTOLIC BLOOD PRESSURE: 62 MMHG | OXYGEN SATURATION: 98 % | SYSTOLIC BLOOD PRESSURE: 108 MMHG | WEIGHT: 132.4 LBS | BODY MASS INDEX: 22.06 KG/M2 | TEMPERATURE: 97 F | HEIGHT: 65 IN

## 2020-09-23 DIAGNOSIS — F51.01 PRIMARY INSOMNIA: ICD-10-CM

## 2020-09-23 DIAGNOSIS — E78.5 DYSLIPIDEMIA: ICD-10-CM

## 2020-09-23 DIAGNOSIS — M54.50 CHRONIC RIGHT-SIDED LOW BACK PAIN WITHOUT SCIATICA: ICD-10-CM

## 2020-09-23 DIAGNOSIS — K21.9 GASTROESOPHAGEAL REFLUX DISEASE WITHOUT ESOPHAGITIS: Primary | ICD-10-CM

## 2020-09-23 DIAGNOSIS — M81.0 POSTMENOPAUSAL OSTEOPOROSIS: ICD-10-CM

## 2020-09-23 DIAGNOSIS — J30.89 SEASONAL ALLERGIC RHINITIS DUE TO OTHER ALLERGIC TRIGGER: ICD-10-CM

## 2020-09-23 DIAGNOSIS — G89.29 CHRONIC RIGHT-SIDED LOW BACK PAIN WITHOUT SCIATICA: ICD-10-CM

## 2020-09-23 DIAGNOSIS — E53.8 B12 DEFICIENCY: ICD-10-CM

## 2020-09-23 DIAGNOSIS — J30.2 SEASONAL ALLERGIC RHINITIS, UNSPECIFIED TRIGGER: ICD-10-CM

## 2020-09-23 DIAGNOSIS — Z00.00 MEDICARE ANNUAL WELLNESS VISIT, SUBSEQUENT: ICD-10-CM

## 2020-09-23 DIAGNOSIS — L85.3 XEROSIS CUTIS: ICD-10-CM

## 2020-09-23 DIAGNOSIS — E55.9 VITAMIN D DEFICIENCY: ICD-10-CM

## 2020-09-23 LAB
25(OH)D3+25(OH)D2 SERPL-MCNC: 52.4 NG/ML (ref 30–100)
ALBUMIN SERPL-MCNC: 4.6 G/DL (ref 3.5–5.2)
ALBUMIN/GLOB SERPL: 3.1 G/DL
ALP SERPL-CCNC: 83 U/L (ref 39–117)
ALT SERPL-CCNC: 22 U/L (ref 1–33)
AST SERPL-CCNC: 21 U/L (ref 1–32)
BILIRUB SERPL-MCNC: 0.3 MG/DL (ref 0–1.2)
BUN SERPL-MCNC: 16 MG/DL (ref 8–23)
BUN/CREAT SERPL: 21.1 (ref 7–25)
CALCIUM SERPL-MCNC: 9.1 MG/DL (ref 8.6–10.5)
CHLORIDE SERPL-SCNC: 98 MMOL/L (ref 98–107)
CHOLEST SERPL-MCNC: 211 MG/DL (ref 0–200)
CO2 SERPL-SCNC: 30.6 MMOL/L (ref 22–29)
CREAT SERPL-MCNC: 0.76 MG/DL (ref 0.57–1)
ERYTHROCYTE [DISTWIDTH] IN BLOOD BY AUTOMATED COUNT: 12.6 % (ref 12.3–15.4)
GLOBULIN SER CALC-MCNC: 1.5 GM/DL
GLUCOSE SERPL-MCNC: 78 MG/DL (ref 65–99)
HCT VFR BLD AUTO: 38.7 % (ref 34–46.6)
HDLC SERPL-MCNC: 77 MG/DL (ref 40–60)
HGB BLD-MCNC: 13.1 G/DL (ref 12–15.9)
LDLC SERPL CALC-MCNC: 119 MG/DL (ref 0–100)
MCH RBC QN AUTO: 32.8 PG (ref 26.6–33)
MCHC RBC AUTO-ENTMCNC: 33.9 G/DL (ref 31.5–35.7)
MCV RBC AUTO: 96.8 FL (ref 79–97)
PLATELET # BLD AUTO: 316 10*3/MM3 (ref 140–450)
POTASSIUM SERPL-SCNC: 4.9 MMOL/L (ref 3.5–5.2)
PROT SERPL-MCNC: 6.1 G/DL (ref 6–8.5)
RBC # BLD AUTO: 4 10*6/MM3 (ref 3.77–5.28)
SODIUM SERPL-SCNC: 136 MMOL/L (ref 136–145)
TRIGL SERPL-MCNC: 77 MG/DL (ref 0–150)
TSH SERPL DL<=0.005 MIU/L-ACNC: 3.23 UIU/ML (ref 0.27–4.2)
VIT B12 SERPL-MCNC: 1243 PG/ML (ref 211–946)
VLDLC SERPL CALC-MCNC: 15.4 MG/DL
WBC # BLD AUTO: 5.73 10*3/MM3 (ref 3.4–10.8)

## 2020-09-23 PROCEDURE — G0439 PPPS, SUBSEQ VISIT: HCPCS | Performed by: INTERNAL MEDICINE

## 2020-09-23 PROCEDURE — G0444 DEPRESSION SCREEN ANNUAL: HCPCS | Performed by: INTERNAL MEDICINE

## 2020-09-23 PROCEDURE — 90694 VACC AIIV4 NO PRSRV 0.5ML IM: CPT | Performed by: INTERNAL MEDICINE

## 2020-09-23 PROCEDURE — 99397 PER PM REEVAL EST PAT 65+ YR: CPT | Performed by: INTERNAL MEDICINE

## 2020-09-23 PROCEDURE — 99213 OFFICE O/P EST LOW 20 MIN: CPT | Performed by: INTERNAL MEDICINE

## 2020-09-23 PROCEDURE — G0008 ADMIN INFLUENZA VIRUS VAC: HCPCS | Performed by: INTERNAL MEDICINE

## 2020-09-23 PROCEDURE — 96160 PT-FOCUSED HLTH RISK ASSMT: CPT | Performed by: INTERNAL MEDICINE

## 2020-09-23 RX ORDER — DEXTROMETHORPHAN HYDROBROMIDE AND PROMETHAZINE HYDROCHLORIDE 15; 6.25 MG/5ML; MG/5ML
5 SYRUP ORAL 4 TIMES DAILY PRN
Qty: 240 ML | Refills: 0 | Status: SHIPPED | OUTPATIENT
Start: 2020-09-23 | End: 2021-06-14

## 2020-09-23 RX ORDER — PANTOPRAZOLE SODIUM 40 MG/1
40 TABLET, DELAYED RELEASE ORAL DAILY
Qty: 90 TABLET | Refills: 3 | Status: SHIPPED | OUTPATIENT
Start: 2020-09-23 | End: 2021-10-21

## 2020-09-23 RX ORDER — LEVOCETIRIZINE DIHYDROCHLORIDE 5 MG/1
5 TABLET, FILM COATED ORAL EVERY EVENING
Qty: 90 TABLET | Refills: 1 | Status: SHIPPED | OUTPATIENT
Start: 2020-09-23 | End: 2020-12-08

## 2020-09-23 RX ORDER — TRIAMCINOLONE ACETONIDE 0.25 MG/G
CREAM TOPICAL 2 TIMES DAILY
Qty: 80 G | Refills: 5 | Status: SHIPPED | OUTPATIENT
Start: 2020-09-23 | End: 2021-12-13

## 2020-09-23 RX ORDER — FLUTICASONE PROPIONATE 50 MCG
1 SPRAY, SUSPENSION (ML) NASAL 2 TIMES DAILY
Qty: 1 BOTTLE | Refills: 5 | Status: SHIPPED | OUTPATIENT
Start: 2020-09-23 | End: 2021-02-24 | Stop reason: SDUPTHER

## 2020-09-23 RX ORDER — FAMOTIDINE 40 MG/1
40 TABLET, FILM COATED ORAL DAILY
Qty: 30 TABLET | Refills: 2 | Status: SHIPPED | OUTPATIENT
Start: 2020-09-23 | End: 2021-01-11 | Stop reason: SDUPTHER

## 2020-09-23 RX ORDER — TRAZODONE HYDROCHLORIDE 50 MG/1
100 TABLET ORAL NIGHTLY
Qty: 180 TABLET | Refills: 1 | Status: SHIPPED | OUTPATIENT
Start: 2020-09-23 | End: 2021-05-10 | Stop reason: SDUPTHER

## 2020-09-23 NOTE — PROGRESS NOTES
The ABCs of the Annual Wellness Visit  Subsequent Medicare Wellness Visit    Chief Complaint   Patient presents with   • Medicare Wellness-subsequent       Subjective   History of Present Illness:  Maura Barry is a 75 y.o. female who presents for a Subsequent Medicare Wellness Visit.    HEALTH RISK ASSESSMENT    Recent Hospitalizations:  No hospitalization(s) within the last year.    Current Medical Providers:  Patient Care Team:  Bronwyn Bird MD as PCP - General (Internal Medicine)    Smoking Status:  Social History     Tobacco Use   Smoking Status Never Smoker   Smokeless Tobacco Never Used       Alcohol Consumption:  Social History     Substance and Sexual Activity   Alcohol Use Yes    Comment: 1 or less a week       Depression Screen:   PHQ-2/PHQ-9 Depression Screening 9/23/2020   Little interest or pleasure in doing things 0   Feeling down, depressed, or hopeless 0   Total Score 0       Fall Risk Screen:  BUFFYADI Fall Risk Assessment was completed, and patient is at MODERATE risk for falls. Assessment completed on:9/23/2020    Health Habits and Functional and Cognitive Screening:  Functional & Cognitive Status 9/23/2020   Do you have difficulty preparing food and eating? No   Do you have difficulty bathing yourself, getting dressed or grooming yourself? No   Do you have difficulty using the toilet? No   Do you have difficulty moving around from place to place? No   Do you have trouble with steps or getting out of a bed or a chair? No   Current Diet Well Balanced Diet   Dental Exam Up to date   Eye Exam Up to date   Exercise (times per week) -   Current Exercise Activities Include Walking   Do you need help using the phone?  No   Are you deaf or do you have serious difficulty hearing?  No   Do you need help with transportation? No   Do you need help shopping? No   Do you need help preparing meals?  No   Do you need help with housework?  No   Do you need help with laundry? No   Do you need help taking  your medications? No   Do you need help managing money? No   Do you ever drive or ride in a car without wearing a seat belt? No   Have you felt unusual stress, anger or loneliness in the last month? No   Who do you live with? Alone   If you need help, do you have trouble finding someone available to you? No   Have you been bothered in the last four weeks by sexual problems? No   Do you have difficulty concentrating, remembering or making decisions? Yes         Does the patient have evidence of cognitive impairment? No    Asprin use counseling:Start ASA 81 mg daily     Age-appropriate Screening Schedule:  Refer to the list below for future screening recommendations based on patient's age, sex and/or medical conditions. Orders for these recommended tests are listed in the plan section. The patient has been provided with a written plan.    Health Maintenance   Topic Date Due   • MAMMOGRAM  12/16/2021   • DXA SCAN  12/16/2021   • COLONOSCOPY  07/10/2024   • TDAP/TD VACCINES (3 - Td) 07/11/2026   • INFLUENZA VACCINE  Completed   • ZOSTER VACCINE  Completed          The following portions of the patient's history were reviewed and updated as appropriate: allergies, current medications, past family history, past medical history, past social history, past surgical history and problem list.    Outpatient Medications Prior to Visit   Medication Sig Dispense Refill   • ALLERGY SERUM INJECTION Inject  under the skin 1 (One) Time Per Week.     • Ascorbic Acid (VITAMIN C) 500 MG capsule Take 1,000 mg by mouth Daily.     • azelastine (ASTELIN) 0.1 % nasal spray 2 sprays into the nostril(s) as directed by provider Daily. Use in each nostril as directed 1 each 2   • buPROPion XL (WELLBUTRIN XL) 300 MG 24 hr tablet Take 1 tablet by mouth Daily.  2   • Calcium-Magnesium (CALCIUM MAGNESIUM 750) 300-300 MG tablet Take 3 tablets by mouth Daily.     • Cholecalciferol (VITAMIN D3) 5000 units capsule capsule Take 5,000 Units by mouth Daily.      • Cyanocobalamin 1000 MCG sublingual tablet Place 1 tablet under the tongue Daily. 30 each 5   • DICLOFENAC PO Take  by mouth.     • Glucosamine HCl 1000 MG tablet Take 2,000 mg by mouth Daily.     • magnesium oxide (MAGOX) 400 (241.3 Mg) MG tablet tablet Take 400 mg by mouth Daily.     • memantine (NAMENDA) 10 MG tablet Take 10 mg by mouth 2 (Two) Times a Day.     • montelukast (Singulair) 10 MG tablet Take 1 tablet by mouth Every Night. 30 tablet 2   • Omega-3 Fatty Acids (FISH OIL) 1000 MG capsule capsule Take 1,000 mg by mouth Daily With Breakfast.     • pseudoephedrine (Sudafed 12 Hour) 120 MG 12 hr tablet Take 1 tablet by mouth Every 12 (Twelve) Hours. 60 tablet 5   • VIIBRYD 20 MG tablet tablet Take 1 tablet by mouth Daily.     • zoledronic acid (ZOMETA) 4 MG/100ML solution infusion (premix) Infuse 4 mg into a venous catheter 1 (One) Time.     • famotidine (PEPCID) 40 MG tablet Take 1 tablet by mouth Daily. 30 tablet 2   • fluticasone (FLONASE) 50 MCG/ACT nasal spray 1 spray into the nostril(s) as directed by provider Daily. 1 bottle 5   • pantoprazole (PROTONIX) 40 MG EC tablet Take 1 tablet by mouth Daily. 90 tablet 3   • promethazine-dextromethorphan (PROMETHAZINE-DM) 6.25-15 MG/5ML syrup Take 5 mL by mouth 4 (Four) Times a Day As Needed for Cough. 240 mL 0   • traZODone (DESYREL) 50 MG tablet Take 2 tablets by mouth Every Night. 180 tablet 1   • benzonatate (TESSALON) 200 MG capsule Take 1 capsule by mouth 3 (Three) Times a Day As Needed for Cough. 20 capsule 0   • predniSONE (DELTASONE) 10 MG (48) tablet pack Take medication as prescribed 48 tablet 0     No facility-administered medications prior to visit.        Patient Active Problem List   Diagnosis   • KRISTY (obstructive sleep apnea)   • B12 deficiency   • Iron deficiency   • Dyslipidemia   • Other fatigue   • Menopausal osteoporosis       Advanced Care Planning:  ACP discussion was held with the patient during this visit. Patient has an advance  "directive (not in EMR), copy requested.    Review of Systems   Constitutional: Negative.  Negative for chills, fatigue and fever.   HENT: Positive for postnasal drip and sinus pressure. Negative for congestion, ear discharge, ear pain and sore throat.    Eyes: Negative for pain, redness and visual disturbance.   Respiratory: Positive for cough. Negative for chest tightness and shortness of breath.    Cardiovascular: Negative for chest pain, palpitations and leg swelling.   Gastrointestinal: Negative for abdominal pain, diarrhea, nausea and vomiting.   Endocrine: Negative for cold intolerance and heat intolerance.   Genitourinary: Negative for flank pain and urgency.   Musculoskeletal: Positive for arthralgias. Negative for back pain, gait problem and myalgias.   Skin: Negative for pallor and rash.   Neurological: Negative for dizziness, syncope, weakness and headaches.   Psychiatric/Behavioral: Negative for confusion, dysphoric mood and sleep disturbance. The patient is not nervous/anxious.        Compared to one year ago, the patient feels her physical health is better.  Compared to one year ago, the patient feels her mental health is better.    Reviewed chart for potential of high risk medication in the elderly: yes  Reviewed chart for potential of harmful drug interactions in the elderly:yes    Objective         Vitals:    09/23/20 0749   BP: 108/62   Pulse: 94   Temp: 97 °F (36.1 °C)   SpO2: 98%  Comment: ra   Weight: 60.1 kg (132 lb 6.4 oz)   Height: 165.1 cm (65\")   PainSc:   5   PainLoc: Back       Body mass index is 22.03 kg/m².  Discussed the patient's BMI with her. The BMI is in the acceptable range.    Physical Exam  Vitals signs and nursing note reviewed.   Constitutional:       Appearance: She is well-developed.   HENT:      Head: Normocephalic and atraumatic.      Right Ear: External ear normal. There is no impacted cerumen. Tympanic membrane is bulging.      Left Ear: External ear normal. There is no " impacted cerumen. Tympanic membrane is bulging.      Nose: Congestion present. No rhinorrhea.      Mouth/Throat:      Pharynx: Posterior oropharyngeal erythema present. No oropharyngeal exudate.      Tonsils: No tonsillar abscesses.   Eyes:      General: No scleral icterus.     Conjunctiva/sclera: Conjunctivae normal.      Pupils: Pupils are equal, round, and reactive to light.   Neck:      Musculoskeletal: Normal range of motion and neck supple.      Thyroid: No thyromegaly.   Cardiovascular:      Rate and Rhythm: Normal rate and regular rhythm.      Heart sounds: No murmur. No gallop.    Pulmonary:      Effort: Pulmonary effort is normal.      Breath sounds: Normal breath sounds. No stridor. No wheezing or rales.   Abdominal:      General: Bowel sounds are normal. There is no distension.      Palpations: Abdomen is soft. There is no mass.      Tenderness: There is no abdominal tenderness.      Hernia: No hernia is present.   Musculoskeletal:         General: No swelling or tenderness.      Right lower leg: No edema.      Left lower leg: No edema.   Lymphadenopathy:      Cervical: Cervical adenopathy present.   Skin:     General: Skin is warm and dry.      Findings: No lesion.   Neurological:      Mental Status: She is alert and oriented to person, place, and time.      Cranial Nerves: No cranial nerve deficit.      Sensory: No sensory deficit.      Coordination: Coordination normal.      Deep Tendon Reflexes: Reflexes normal.   Psychiatric:         Mood and Affect: Mood normal.         Judgment: Judgment normal.         Lab Results   Component Value Date    GLU 78 09/22/2020    CHLPL 211 (H) 09/22/2020    TRIG 77 09/22/2020    HDL 77 (H) 09/22/2020     (H) 09/22/2020    VLDL 15.4 09/22/2020        Assessment/Plan   Medicare Risks and Personalized Health Plan  CMS Preventative Services Quick Reference  Breast Cancer/Mammogram Screening  Depression/Dysphoria  Diabetic Lab Screening   Fall Risk  Glaucoma  Risk  Immunizations Discussed/Encouraged (specific immunizations; Influenza )  Osteoprorosis Risk    The above risks/problems have been discussed with the patient.  Pertinent information has been shared with the patient in the After Visit Summary.  Follow up plans and orders are seen below in the Assessment/Plan Section.    Medicare Wellness-subsequent    Subjective   Maura Barry is a 75 y.o. female is here today for follow-up.  HPI  Pelvic floor is better, and has been going to [facility].  Also followed by Dr. Real, for her rt hip/ back pain, thought due to bursitis, and is better.  Urination is prolonged, and has a h/o bladder sling.    Increased allergies and cough, and has to spit, was started on meds per Hamida- singulair, astelin and cough meds, but wakes up at night and unable to sleep because of the cough.  Has been going on since June.  Incredible sense of fatigue after meals.  Itchy skin.  Some rash would like rx for kenalog 0.025% .    Answers for HPI/ROS submitted by the patient on 9/21/2020   What is the primary reason for your visit?: Other  Please describe your symptoms.: Annual checkup, Lower back pain  Have you had these symptoms before?: Yes  How long have you been having these symptoms?: Greater than 2 weeks  Please list any medications you are currently taking for this condition.: Steroid injection  Please describe any probable cause for these symptoms. : Twisting motion on back        Current Outpatient Medications:   •  ALLERGY SERUM INJECTION, Inject  under the skin 1 (One) Time Per Week., Disp: , Rfl:   •  Ascorbic Acid (VITAMIN C) 500 MG capsule, Take 1,000 mg by mouth Daily., Disp: , Rfl:   •  azelastine (ASTELIN) 0.1 % nasal spray, 2 sprays into the nostril(s) as directed by provider Daily. Use in each nostril as directed, Disp: 1 each, Rfl: 2  •  buPROPion XL (WELLBUTRIN XL) 300 MG 24 hr tablet, Take 1 tablet by mouth Daily., Disp: , Rfl: 2  •  Calcium-Magnesium (CALCIUM  MAGNESIUM 750) 300-300 MG tablet, Take 3 tablets by mouth Daily., Disp: , Rfl:   •  Cholecalciferol (VITAMIN D3) 5000 units capsule capsule, Take 5,000 Units by mouth Daily., Disp: , Rfl:   •  Cyanocobalamin 1000 MCG sublingual tablet, Place 1 tablet under the tongue Daily., Disp: 30 each, Rfl: 5  •  DICLOFENAC PO, Take  by mouth., Disp: , Rfl:   •  famotidine (PEPCID) 40 MG tablet, Take 1 tablet by mouth Daily., Disp: 30 tablet, Rfl: 2  •  fluticasone (FLONASE) 50 MCG/ACT nasal spray, 1 spray into the nostril(s) as directed by provider 2 (two) times a day., Disp: 1 bottle, Rfl: 5  •  Glucosamine HCl 1000 MG tablet, Take 2,000 mg by mouth Daily., Disp: , Rfl:   •  magnesium oxide (MAGOX) 400 (241.3 Mg) MG tablet tablet, Take 400 mg by mouth Daily., Disp: , Rfl:   •  memantine (NAMENDA) 10 MG tablet, Take 10 mg by mouth 2 (Two) Times a Day., Disp: , Rfl:   •  montelukast (Singulair) 10 MG tablet, Take 1 tablet by mouth Every Night., Disp: 30 tablet, Rfl: 2  •  Omega-3 Fatty Acids (FISH OIL) 1000 MG capsule capsule, Take 1,000 mg by mouth Daily With Breakfast., Disp: , Rfl:   •  pantoprazole (PROTONIX) 40 MG EC tablet, Take 1 tablet by mouth Daily., Disp: 90 tablet, Rfl: 3  •  promethazine-dextromethorphan (PROMETHAZINE-DM) 6.25-15 MG/5ML syrup, Take 5 mL by mouth 4 (Four) Times a Day As Needed for Cough., Disp: 240 mL, Rfl: 0  •  pseudoephedrine (Sudafed 12 Hour) 120 MG 12 hr tablet, Take 1 tablet by mouth Every 12 (Twelve) Hours., Disp: 60 tablet, Rfl: 5  •  traZODone (DESYREL) 50 MG tablet, Take 2 tablets by mouth Every Night., Disp: 180 tablet, Rfl: 1  •  VIIBRYD 20 MG tablet tablet, Take 1 tablet by mouth Daily., Disp: , Rfl:   •  zoledronic acid (ZOMETA) 4 MG/100ML solution infusion (premix), Infuse 4 mg into a venous catheter 1 (One) Time., Disp: , Rfl:   •  levocetirizine (Xyzal) 5 MG tablet, Take 1 tablet by mouth Every Evening., Disp: 90 tablet, Rfl: 1  •  triamcinolone (KENALOG) 0.025 % cream, Apply   "topically to the appropriate area as directed 2 (Two) Times a Day., Disp: 80 g, Rfl: 5      The following portions of the patient's history were reviewed and updated as appropriate: allergies, current medications, past family history, past medical history, past social history, past surgical history and problem list.        Objective   /62   Pulse 94   Temp 97 °F (36.1 °C)   Ht 165.1 cm (65\")   Wt 60.1 kg (132 lb 6.4 oz)   SpO2 98% Comment: ra  BMI 22.03 kg/m²         Results for orders placed or performed in visit on 05/18/20   NuSwab Vaginitis (VG) - Swab, Vagina    Specimen: Vagina; Swab    SWAB   Result Value Ref Range    Atopobium Vaginae Low - 0 Score    BVAB 2 Low - 0 Score    Megasphaera 1 Low - 0 Score    Candida Albicans, STEPHANIE Negative Negative    Candida Glabrata, STEPHANIE Negative Negative    Trichomonas vaginosis Negative Negative   Vitamin B12    Specimen: Blood    BLOOD   Result Value Ref Range    Vitamin B-12 1,243 (H) 211 - 946 pg/mL   Vitamin D 25 Hydroxy    Specimen: Blood    BLOOD   Result Value Ref Range    25 Hydroxy, Vitamin D 52.4 30.0 - 100.0 ng/ml   TSH    Specimen: Blood    BLOOD   Result Value Ref Range    TSH 3.230 0.270 - 4.200 uIU/mL   Lipid Panel    Specimen: Blood    BLOOD   Result Value Ref Range    Total Cholesterol 211 (H) 0 - 200 mg/dL    Triglycerides 77 0 - 150 mg/dL    HDL Cholesterol 77 (H) 40 - 60 mg/dL    VLDL Cholesterol Mann 15.4 mg/dL    LDL Chol Calc (NIH) 119 (H) 0 - 100 mg/dL   Comprehensive Metabolic Panel    Specimen: Blood    BLOOD   Result Value Ref Range    Glucose 78 65 - 99 mg/dL    BUN 16 8 - 23 mg/dL    Creatinine 0.76 0.57 - 1.00 mg/dL    eGFR Non African Am 75 >60 mL/min/1.73    eGFR African Am 90 >60 mL/min/1.73    BUN/Creatinine Ratio 21.1 7.0 - 25.0    Sodium 136 136 - 145 mmol/L    Potassium 4.9 3.5 - 5.2 mmol/L    Chloride 98 98 - 107 mmol/L    Total CO2 30.6 (H) 22.0 - 29.0 mmol/L    Calcium 9.1 8.6 - 10.5 mg/dL    Total Protein 6.1 6.0 - 8.5 g/dL "    Albumin 4.60 3.50 - 5.20 g/dL    Globulin 1.5 gm/dL    A/G Ratio 3.1 g/dL    Total Bilirubin 0.3 0.0 - 1.2 mg/dL    Alkaline Phosphatase 83 39 - 117 U/L    AST (SGOT) 21 1 - 32 U/L    ALT (SGPT) 22 1 - 33 U/L   CBC (No Diff)    Specimen: Blood    BLOOD   Result Value Ref Range    WBC 5.73 3.40 - 10.80 10*3/mm3    RBC 4.00 3.77 - 5.28 10*6/mm3    Hemoglobin 13.1 12.0 - 15.9 g/dL    Hematocrit 38.7 34.0 - 46.6 %    MCV 96.8 79.0 - 97.0 fL    MCH 32.8 26.6 - 33.0 pg    MCHC 33.9 31.5 - 35.7 g/dL    RDW 12.6 12.3 - 15.4 %    Platelets 316 140 - 450 10*3/mm3             Assessment/Plan   Diagnoses and all orders for this visit:    Gastroesophageal reflux disease without esophagitis  -     famotidine (PEPCID) 40 MG tablet; Take 1 tablet by mouth Daily.  -     pantoprazole (PROTONIX) 40 MG EC tablet; Take 1 tablet by mouth Daily.  -     FL Upper GI Single Contrast With KUB    Medicare annual wellness visit, subsequent    B12 deficiency  -     CBC (No Diff); Future  -     Vitamin B12; Future    Dyslipidemia  -     Comprehensive Metabolic Panel; Future  -     Lipid Panel; Future  -     TSH; Future    Vitamin D deficiency  -     Vitamin D 25 Hydroxy; Future    Seasonal allergic rhinitis due to other allergic trigger  -     levocetirizine (Xyzal) 5 MG tablet; Take 1 tablet by mouth Every Evening.  -     fluticasone (FLONASE) 50 MCG/ACT nasal spray; 1 spray into the nostril(s) as directed by provider 2 (two) times a day.    Xerosis cutis  -     triamcinolone (KENALOG) 0.025 % cream; Apply  topically to the appropriate area as directed 2 (Two) Times a Day.    Chronic right-sided low back pain without sciatica  -     fluticasone (FLONASE) 50 MCG/ACT nasal spray; 1 spray into the nostril(s) as directed by provider 2 (two) times a day.    Primary insomnia  -     traZODone (DESYREL) 50 MG tablet; Take 2 tablets by mouth Every Night.    Seasonal allergic rhinitis, unspecified trigger  -     promethazine-dextromethorphan  (PROMETHAZINE-DM) 6.25-15 MG/5ML syrup; Take 5 mL by mouth 4 (Four) Times a Day As Needed for Cough.    Postmenopausal osteoporosis  -     DEXA Bone Density Axial; Future    Other orders  -     Fluad Quad 65+ yrs ([id number])               PHQ-2/PHQ-9 Depression screening reviewed with patient . Total time spent today for depression screening  with Maura Barry  was 15 minutes in counseling, along with plans for any diagnositc work-up and treatment.    Follow Up:  Return in about 1 year (around 9/23/2021) for Medicare Wellness, and 2 day after Upper GI study.     An After Visit Summary and PPPS were given to the patient.

## 2020-09-25 ENCOUNTER — TELEPHONE (OUTPATIENT)
Dept: INTERNAL MEDICINE | Facility: CLINIC | Age: 75
End: 2020-09-25

## 2020-09-25 NOTE — TELEPHONE ENCOUNTER
Patient requesting orders for bone density test and mammogram.     Call back number is [phone]

## 2020-10-02 ENCOUNTER — APPOINTMENT (OUTPATIENT)
Dept: PREADMISSION TESTING | Facility: HOSPITAL | Age: 75
End: 2020-10-02

## 2020-10-02 PROCEDURE — U0004 COV-19 TEST NON-CDC HGH THRU: HCPCS

## 2020-10-02 PROCEDURE — C9803 HOPD COVID-19 SPEC COLLECT: HCPCS

## 2020-10-03 LAB — SARS-COV-2 RNA NOSE QL NAA+PROBE: NOT DETECTED

## 2020-10-05 ENCOUNTER — HOSPITAL ENCOUNTER (OUTPATIENT)
Dept: GENERAL RADIOLOGY | Facility: HOSPITAL | Age: 75
Discharge: HOME | End: 2020-10-05
Admitting: INTERNAL MEDICINE

## 2020-10-05 ENCOUNTER — TRANSCRIBE ORDERS (OUTPATIENT)
Dept: ADMINISTRATIVE | Facility: HOSPITAL | Age: 75
End: 2020-10-05

## 2020-10-05 DIAGNOSIS — Z12.31 VISIT FOR SCREENING MAMMOGRAM: Primary | ICD-10-CM

## 2020-10-05 PROCEDURE — A9270 NON-COVERED ITEM OR SERVICE: HCPCS | Performed by: INTERNAL MEDICINE

## 2020-10-05 PROCEDURE — 74240 X-RAY XM UPR GI TRC 1CNTRST: CPT

## 2020-10-05 PROCEDURE — 63710000001 BARIUM SULFATE 96 % RECONSTITUTED SUSPENSION: Performed by: INTERNAL MEDICINE

## 2020-10-05 RX ADMIN — BARIUM SULFATE 183 ML: 960 POWDER, FOR SUSPENSION ORAL at 08:54

## 2020-10-07 ENCOUNTER — OFFICE VISIT (OUTPATIENT)
Dept: INTERNAL MEDICINE | Facility: CLINIC | Age: 75
End: 2020-10-07

## 2020-10-07 VITALS
OXYGEN SATURATION: 98 % | HEIGHT: 65 IN | TEMPERATURE: 97.7 F | WEIGHT: 129.6 LBS | BODY MASS INDEX: 21.59 KG/M2 | DIASTOLIC BLOOD PRESSURE: 60 MMHG | SYSTOLIC BLOOD PRESSURE: 100 MMHG | HEART RATE: 87 BPM

## 2020-10-07 DIAGNOSIS — R05.9 COUGH: Primary | ICD-10-CM

## 2020-10-07 PROCEDURE — 99213 OFFICE O/P EST LOW 20 MIN: CPT | Performed by: INTERNAL MEDICINE

## 2020-10-07 NOTE — PROGRESS NOTES
Follow-up (on upper GI 10/5/2020)    Subjective   Maura Barry is a 75 y.o. female is here today for follow-up.    History of Present Illness   Here for a follow up on her GERD and allergies.  Alternating protonix and pepcid.   Congestion is better.    Current Outpatient Medications:   •  ALLERGY SERUM INJECTION, Inject  under the skin 1 (One) Time Per Week., Disp: , Rfl:   •  Ascorbic Acid (VITAMIN C) 500 MG capsule, Take 1,000 mg by mouth Daily., Disp: , Rfl:   •  azelastine (ASTELIN) 0.1 % nasal spray, 2 sprays into the nostril(s) as directed by provider Daily. Use in each nostril as directed, Disp: 1 each, Rfl: 2  •  buPROPion XL (WELLBUTRIN XL) 300 MG 24 hr tablet, Take 1 tablet by mouth Daily., Disp: , Rfl: 2  •  Calcium-Magnesium (CALCIUM MAGNESIUM 750) 300-300 MG tablet, Take 3 tablets by mouth Daily., Disp: , Rfl:   •  Cholecalciferol (VITAMIN D3) 5000 units capsule capsule, Take 5,000 Units by mouth Daily., Disp: , Rfl:   •  Cyanocobalamin 1000 MCG sublingual tablet, Place 1 tablet under the tongue Daily., Disp: 30 each, Rfl: 5  •  DICLOFENAC PO, Take  by mouth., Disp: , Rfl:   •  famotidine (PEPCID) 40 MG tablet, Take 1 tablet by mouth Daily., Disp: 30 tablet, Rfl: 2  •  fluticasone (FLONASE) 50 MCG/ACT nasal spray, 1 spray into the nostril(s) as directed by provider 2 (two) times a day., Disp: 1 bottle, Rfl: 5  •  Glucosamine HCl 1000 MG tablet, Take 2,000 mg by mouth Daily., Disp: , Rfl:   •  levocetirizine (Xyzal) 5 MG tablet, Take 1 tablet by mouth Every Evening., Disp: 90 tablet, Rfl: 1  •  magnesium oxide (MAGOX) 400 (241.3 Mg) MG tablet tablet, Take 400 mg by mouth Daily., Disp: , Rfl:   •  memantine (NAMENDA) 10 MG tablet, Take 10 mg by mouth 2 (Two) Times a Day., Disp: , Rfl:   •  montelukast (Singulair) 10 MG tablet, Take 1 tablet by mouth Every Night., Disp: 30 tablet, Rfl: 2  •  Omega-3 Fatty Acids (FISH OIL) 1000 MG capsule capsule, Take 1,000 mg by mouth Daily With Breakfast., Disp: ,  "Rfl:   •  pantoprazole (PROTONIX) 40 MG EC tablet, Take 1 tablet by mouth Daily., Disp: 90 tablet, Rfl: 3  •  promethazine-dextromethorphan (PROMETHAZINE-DM) 6.25-15 MG/5ML syrup, Take 5 mL by mouth 4 (Four) Times a Day As Needed for Cough., Disp: 240 mL, Rfl: 0  •  pseudoephedrine (Sudafed 12 Hour) 120 MG 12 hr tablet, Take 1 tablet by mouth Every 12 (Twelve) Hours., Disp: 60 tablet, Rfl: 5  •  traZODone (DESYREL) 50 MG tablet, Take 2 tablets by mouth Every Night., Disp: 180 tablet, Rfl: 1  •  triamcinolone (KENALOG) 0.025 % cream, Apply  topically to the appropriate area as directed 2 (Two) Times a Day., Disp: 80 g, Rfl: 5  •  VIIBRYD 20 MG tablet tablet, Take 1 tablet by mouth Daily., Disp: , Rfl:   •  zoledronic acid (ZOMETA) 4 MG/100ML solution infusion (premix), Infuse 4 mg into a venous catheter 1 (One) Time., Disp: , Rfl:       The following portions of the patient's history were reviewed and updated as appropriate: allergies, current medications, past family history, past medical history, past social history, past surgical history and problem list.    Review of Systems   Constitutional: Negative.  Negative for chills and fever.   HENT: Positive for congestion (better). Negative for ear discharge, ear pain, sinus pressure and sore throat.    Respiratory: Positive for cough (better). Negative for chest tightness and shortness of breath.    Cardiovascular: Negative for chest pain, palpitations and leg swelling.   Gastrointestinal: Negative for diarrhea, nausea and vomiting.   Musculoskeletal: Negative for arthralgias, back pain and myalgias.   Neurological: Negative for dizziness, syncope and headaches.   Psychiatric/Behavioral: Negative for confusion and sleep disturbance.       Objective   /60   Pulse 87   Temp 97.7 °F (36.5 °C)   Ht 165.1 cm (65\")   Wt 58.8 kg (129 lb 9.6 oz)   SpO2 98% Comment: ra  BMI 21.57 kg/m²   Physical Exam  Vitals signs and nursing note reviewed.   Constitutional:       " Appearance: She is well-developed.   HENT:      Head: Normocephalic and atraumatic.      Right Ear: External ear normal.      Left Ear: External ear normal.      Mouth/Throat:      Pharynx: No oropharyngeal exudate.   Eyes:      Conjunctiva/sclera: Conjunctivae normal.      Pupils: Pupils are equal, round, and reactive to light.   Neck:      Musculoskeletal: Neck supple.      Thyroid: No thyromegaly.   Cardiovascular:      Rate and Rhythm: Normal rate and regular rhythm.   Pulmonary:      Effort: Pulmonary effort is normal.      Breath sounds: Normal breath sounds.   Abdominal:      General: Bowel sounds are normal. There is no distension.      Palpations: Abdomen is soft.      Tenderness: There is no abdominal tenderness.   Skin:     General: Skin is warm and dry.   Neurological:      Mental Status: She is alert and oriented to person, place, and time.      Cranial Nerves: No cranial nerve deficit.   Psychiatric:         Judgment: Judgment normal.           Results for orders placed or performed in visit on 10/02/20   COVID-19,[facility], NP SWAB IN [facility] VIRAL TRANSPORT MEDIA 24-30 HR TAT - Swab, Nasopharynx    Specimen: Nasopharynx; Swab   Result Value Ref Range    SARS-CoV-2 STEPHANIE Not Detected Not Detected             Assessment/Plan   Diagnoses and all orders for this visit:    Cough  Comments:  better on allergy meds, and UGI is okay. WIll hold off on further intervention.    Continue lifestyle changes, and try to cut back on protonix.    Continue allergy meds until winter hits.    Adv. To check if on diclofenac, and if yes, to let me know, will change to celebrex.             Return for Next scheduled follow up.

## 2020-11-06 ENCOUNTER — TELEMEDICINE (OUTPATIENT)
Dept: INTERNAL MEDICINE | Facility: CLINIC | Age: 75
End: 2020-11-06

## 2020-11-06 DIAGNOSIS — R21 RASH AND NONSPECIFIC SKIN ERUPTION: Primary | ICD-10-CM

## 2020-11-06 PROCEDURE — 99213 OFFICE O/P EST LOW 20 MIN: CPT | Performed by: NURSE PRACTITIONER

## 2020-11-06 NOTE — PROGRESS NOTES
Chief Complaint   Patient presents with   • Rash       History of Present Illness    Maura Barry is a 75 y.o. female who presents today for an audio/video visit during the Covid-19 pandemic/federally declared state of public health emergency for rash. The use of a video visit has been reviewed with the patient and verbal informed consent has been obtained.    Rash  This is a chronic (Noticed extremely dry skin starting 1 year ago, rash between knees started about 5-6 months ago, never really subsides. ) problem. The current episode started more than 1 year ago. The affected locations include the left upper leg, right upper leg, lips, back, chest, left arm and right arm. The rash is characterized by redness and itchiness. Pertinent negatives include no anorexia, congestion, cough, diarrhea, eye pain, facial edema, fatigue, fever, joint pain, nail changes, rhinorrhea, shortness of breath, sore throat or vomiting. Past treatments include topical steroids (Stopped singulair and astelin, chapstick, vitamin E oil, triamcinolone cream, lotions applied after bathing). The treatment provided mild relief.   Rash worse on upper inner legs/comes and goes. Has discussed with dermatology previously who has stated etiology as a likely systemic reaction. She is attempting to get appointment with allergist moved up as well.     Review of Systems  Review of Systems   Constitutional: Negative for appetite change, chills, diaphoresis, fatigue and fever.   HENT: Negative for congestion, rhinorrhea and sore throat.    Eyes: Negative for pain.   Respiratory: Negative for cough and shortness of breath.    Gastrointestinal: Negative for anorexia, diarrhea and vomiting.   Musculoskeletal: Negative for joint pain.   Skin: Positive for color change and rash. Negative for nail changes.   Psychiatric/Behavioral: Negative for sleep disturbance.       PMSFH    The following portions of the patient's history were reviewed and updated as  appropriate: allergies, current medications, past family history, past medical history, past social history, past surgical history and problem list.     Social History     Tobacco Use   • Smoking status: Never Smoker   • Smokeless tobacco: Never Used   Substance Use Topics   • Alcohol use: Yes     Comment: 1 or less a week       Past Medical History:   Diagnosis Date   • Allergic    • Arthritis    • Backache    • Depression    • Depression    • Dysuria    • Neck pain    • Pelvic pain    • Vaginitis        Past Surgical History:   Procedure Laterality Date   • BLADDER SUSPENSION         Family History   Problem Relation Age of Onset   • Stroke Mother    • Arthritis Mother    • Mental illness Mother    • Osteoporosis Mother    • Alzheimer's disease Father    • Hyperlipidemia Father    • Asthma Brother    • Heart failure Brother    • Hypertension Brother    • Mental illness Brother    • Cancer Brother    • Heart attack Brother    • Liver disease Brother    • Breast cancer Neg Hx    • Ovarian cancer Neg Hx        Allergies   Allergen Reactions   • Penicillins Hives         Current Outpatient Medications:   •  ALLERGY SERUM INJECTION, Inject  under the skin 1 (One) Time Per Week., Disp: , Rfl:   •  Ascorbic Acid (VITAMIN C) 500 MG capsule, Take 1,000 mg by mouth Daily., Disp: , Rfl:   •  azelastine (ASTELIN) 0.1 % nasal spray, 2 sprays into the nostril(s) as directed by provider Daily. Use in each nostril as directed, Disp: 1 each, Rfl: 2  •  buPROPion XL (WELLBUTRIN XL) 300 MG 24 hr tablet, Take 1 tablet by mouth Daily., Disp: , Rfl: 2  •  Calcium-Magnesium (CALCIUM MAGNESIUM 750) 300-300 MG tablet, Take 3 tablets by mouth Daily., Disp: , Rfl:   •  Cholecalciferol (VITAMIN D3) 5000 units capsule capsule, Take 5,000 Units by mouth Daily., Disp: , Rfl:   •  Cyanocobalamin 1000 MCG sublingual tablet, Place 1 tablet under the tongue Daily., Disp: 30 each, Rfl: 5  •  DICLOFENAC PO, Take  by mouth., Disp: , Rfl:   •   famotidine (PEPCID) 40 MG tablet, Take 1 tablet by mouth Daily., Disp: 30 tablet, Rfl: 2  •  fluticasone (FLONASE) 50 MCG/ACT nasal spray, 1 spray into the nostril(s) as directed by provider 2 (two) times a day., Disp: 1 bottle, Rfl: 5  •  Glucosamine HCl 1000 MG tablet, Take 2,000 mg by mouth Daily., Disp: , Rfl:   •  levocetirizine (Xyzal) 5 MG tablet, Take 1 tablet by mouth Every Evening., Disp: 90 tablet, Rfl: 1  •  magnesium oxide (MAGOX) 400 (241.3 Mg) MG tablet tablet, Take 400 mg by mouth Daily., Disp: , Rfl:   •  memantine (NAMENDA) 10 MG tablet, Take 10 mg by mouth 2 (Two) Times a Day., Disp: , Rfl:   •  montelukast (Singulair) 10 MG tablet, Take 1 tablet by mouth Every Night., Disp: 30 tablet, Rfl: 2  •  Omega-3 Fatty Acids (FISH OIL) 1000 MG capsule capsule, Take 1,000 mg by mouth Daily With Breakfast., Disp: , Rfl:   •  pantoprazole (PROTONIX) 40 MG EC tablet, Take 1 tablet by mouth Daily., Disp: 90 tablet, Rfl: 3  •  promethazine-dextromethorphan (PROMETHAZINE-DM) 6.25-15 MG/5ML syrup, Take 5 mL by mouth 4 (Four) Times a Day As Needed for Cough., Disp: 240 mL, Rfl: 0  •  pseudoephedrine (Sudafed 12 Hour) 120 MG 12 hr tablet, Take 1 tablet by mouth Every 12 (Twelve) Hours., Disp: 60 tablet, Rfl: 5  •  traZODone (DESYREL) 50 MG tablet, Take 2 tablets by mouth Every Night., Disp: 180 tablet, Rfl: 1  •  triamcinolone (KENALOG) 0.025 % cream, Apply  topically to the appropriate area as directed 2 (Two) Times a Day., Disp: 80 g, Rfl: 5  •  VIIBRYD 20 MG tablet tablet, Take 1 tablet by mouth Daily., Disp: , Rfl:   •  zoledronic acid (ZOMETA) 4 MG/100ML solution infusion (premix), Infuse 4 mg into a venous catheter 1 (One) Time., Disp: , Rfl:     Vitals: unable to obtain due to audio/video encounter    Physical Exam: limited due to audio/video encounter  Physical Exam   Constitutional: She appears well-developed and well-nourished. She does not have a sickly appearance. She does not appear ill. No distress.    HENT:   Head: Normocephalic and atraumatic.   Pulmonary/Chest: No stridor.  No respiratory distress.  Neurological: She is alert.   Skin: Skin is warm, dry and intact. Rash noted. Rash is maculopapular.   Pinpoint rash located diffusely, erythematous, edematous rash on bilateral upper inner legs reviewed via photo image, unable to assess fully via video    Psychiatric: She has a normal mood and affect. Her behavior is normal.       Labs  None this visit    Assessment/Plan    Diagnoses and all orders for this visit:    1. Rash and nonspecific skin eruption (Primary)    Discussed necessity of follow-up with allergist given chronic nonspecific and diffuse rash as patient has already followed up with dermatology.  I have discussed the possibility of food sensitivities given chronicity and refractory of rash to allergy medications and topical steroids.  Patient to discuss further with her allergist.  Advised against overuse of topical steroid creams as this can cause skin breakdown.  Recommended cool compresses, oatmeal baths, humidification, adequate water intake, moisturizers, and Benadryl antiitch cream.    Today I have spent a total of 15 minutes on an audio/video encounter with Maura Barry. During this time, a total of 15 minutes was spent in direct discussion with patient regarding pertinent diagnoses, treatment modalities, medications, and follow-up. Education includes verbal discussion on the nature of the diagnoses including treatment, complications, implications, and management. Indications for further evaluation and work-up provided. Patient was informed to notify office of any new, worsening, or persistent symptoms. Patient verbalized understanding and agreement with plan of care.     Follow-Up  Return if symptoms worsen or fail to improve.      Electronically Signed By:  REGGIE Quintanilal/Transcription Disclaimer:  Please note that portions of this encounter note were completed using  electronic transcription/translation of spoken language to printed text.  The electronic transcription/translation of spoken language may permit erroneous, or at times, nonsensical words or phrases to be inadvertently transcribed.  Although I have reviewed the note for such errors, some may still exist in this documentation.

## 2020-12-08 ENCOUNTER — LAB REQUISITION (OUTPATIENT)
Dept: LAB | Facility: HOSPITAL | Age: 75
End: 2020-12-08

## 2020-12-08 ENCOUNTER — OFFICE VISIT (OUTPATIENT)
Dept: INTERNAL MEDICINE | Facility: CLINIC | Age: 75
End: 2020-12-08

## 2020-12-08 VITALS
WEIGHT: 132 LBS | BODY MASS INDEX: 21.99 KG/M2 | TEMPERATURE: 96.5 F | HEIGHT: 65 IN | OXYGEN SATURATION: 97 % | SYSTOLIC BLOOD PRESSURE: 110 MMHG | HEART RATE: 87 BPM | DIASTOLIC BLOOD PRESSURE: 64 MMHG

## 2020-12-08 DIAGNOSIS — R21 RASH AND OTHER NONSPECIFIC SKIN ERUPTION: ICD-10-CM

## 2020-12-08 DIAGNOSIS — R21 RASH: Primary | ICD-10-CM

## 2020-12-08 DIAGNOSIS — M70.71 ILIOPSOAS BURSITIS OF RIGHT HIP: ICD-10-CM

## 2020-12-08 PROCEDURE — 99214 OFFICE O/P EST MOD 30 MIN: CPT | Performed by: INTERNAL MEDICINE

## 2020-12-08 RX ORDER — PREDNISONE 20 MG/1
TABLET ORAL
Qty: 15 TABLET | Refills: 0 | Status: SHIPPED | OUTPATIENT
Start: 2020-12-08 | End: 2021-01-11

## 2020-12-08 NOTE — PROGRESS NOTES
Rash (bilateral inner thigh with burning and itching)    Subjective   Maura Barry is a 75 y.o. female is here today for follow-up.    History of Present Illness   Rash over her thighs b/l red irritated, and itchy and toussaint. Seen by Hamida and then  Was sent to allergist., who ruled out meds as a cause, s/p allergy testing.  Allergic to one thing- wipes used for her dog.Iodopropanyl butylcarbonate.    Denies any new products, not on any nsaids and new products.    Current Outpatient Medications:   •  ALLERGY SERUM INJECTION, Inject  under the skin 1 (One) Time Per Week., Disp: , Rfl:   •  Ascorbic Acid (VITAMIN C) 500 MG capsule, Take 1,000 mg by mouth Daily., Disp: , Rfl:   •  buPROPion XL (WELLBUTRIN XL) 300 MG 24 hr tablet, Take 1 tablet by mouth Daily., Disp: , Rfl: 2  •  Calcium-Magnesium (CALCIUM MAGNESIUM 750) 300-300 MG tablet, Take 3 tablets by mouth Daily., Disp: , Rfl:   •  Cholecalciferol (VITAMIN D3) 5000 units capsule capsule, Take 5,000 Units by mouth Daily., Disp: , Rfl:   •  Cyanocobalamin 1000 MCG sublingual tablet, Place 1 tablet under the tongue Daily., Disp: 30 each, Rfl: 5  •  famotidine (PEPCID) 40 MG tablet, Take 1 tablet by mouth Daily., Disp: 30 tablet, Rfl: 2  •  fluticasone (FLONASE) 50 MCG/ACT nasal spray, 1 spray into the nostril(s) as directed by provider 2 (two) times a day., Disp: 1 bottle, Rfl: 5  •  Glucosamine HCl 1000 MG tablet, Take 2,000 mg by mouth Daily., Disp: , Rfl:   •  magnesium oxide (MAGOX) 400 (241.3 Mg) MG tablet tablet, Take 400 mg by mouth Daily., Disp: , Rfl:   •  memantine (NAMENDA) 10 MG tablet, Take 10 mg by mouth 2 (Two) Times a Day., Disp: , Rfl:   •  Omega-3 Fatty Acids (FISH OIL) 1000 MG capsule capsule, Take 1,000 mg by mouth Daily With Breakfast., Disp: , Rfl:   •  pantoprazole (PROTONIX) 40 MG EC tablet, Take 1 tablet by mouth Daily., Disp: 90 tablet, Rfl: 3  •  traZODone (DESYREL) 50 MG tablet, Take 2 tablets by mouth Every Night., Disp: 180 tablet,  "Rfl: 1  •  triamcinolone (KENALOG) 0.025 % cream, Apply  topically to the appropriate area as directed 2 (Two) Times a Day., Disp: 80 g, Rfl: 5  •  VIIBRYD 20 MG tablet tablet, Take 1 tablet by mouth Daily., Disp: , Rfl:   •  zoledronic acid (ZOMETA) 4 MG/100ML solution infusion (premix), Infuse 4 mg into a venous catheter 1 (One) Time., Disp: , Rfl:   •  predniSONE (DELTASONE) 20 MG tablet, Take 1 tabs BID x 3 days then 1&1/2 tab daily x 3 days then 1 tab daily x 3 days then 1/2 tab daily x 3days then stop., Disp: 15 tablet, Rfl: 0  •  promethazine-dextromethorphan (PROMETHAZINE-DM) 6.25-15 MG/5ML syrup, Take 5 mL by mouth 4 (Four) Times a Day As Needed for Cough., Disp: 240 mL, Rfl: 0  •  pseudoephedrine (Sudafed 12 Hour) 120 MG 12 hr tablet, Take 1 tablet by mouth Every 12 (Twelve) Hours., Disp: 60 tablet, Rfl: 5      The following portions of the patient's history were reviewed and updated as appropriate: allergies, current medications, past family history, past medical history, past social history, past surgical history and problem list.    Review of Systems   Constitutional: Negative.  Negative for chills and fever.   HENT: Negative for ear discharge, ear pain, sinus pressure and sore throat.    Respiratory: Negative for cough, chest tightness and shortness of breath.    Cardiovascular: Negative for chest pain, palpitations and leg swelling.   Gastrointestinal: Negative for diarrhea, nausea and vomiting.   Musculoskeletal: Positive for arthralgias (rt hip). Negative for back pain and myalgias.   Skin: Positive for color change and rash.   Neurological: Negative for dizziness, syncope and headaches.   Psychiatric/Behavioral: Negative for confusion and sleep disturbance.       Objective   /64   Pulse 87   Temp 96.5 °F (35.8 °C)   Ht 165.1 cm (65\")   Wt 59.9 kg (132 lb)   SpO2 97% Comment: ra  BMI 21.97 kg/m²   Physical Exam  Vitals signs and nursing note reviewed.   Constitutional:       Appearance: " She is well-developed.   HENT:      Head: Normocephalic and atraumatic.      Right Ear: External ear normal.      Left Ear: External ear normal.      Mouth/Throat:      Pharynx: No oropharyngeal exudate.   Eyes:      Conjunctiva/sclera: Conjunctivae normal.      Pupils: Pupils are equal, round, and reactive to light.   Neck:      Musculoskeletal: Neck supple.      Thyroid: No thyromegaly.   Cardiovascular:      Rate and Rhythm: Normal rate and regular rhythm.   Pulmonary:      Effort: Pulmonary effort is normal.      Breath sounds: Normal breath sounds.   Abdominal:      General: Bowel sounds are normal. There is no distension.      Palpations: Abdomen is soft.      Tenderness: There is no abdominal tenderness.   Skin:     General: Skin is warm and dry.      Findings: Erythema and rash present.   Neurological:      Mental Status: She is alert and oriented to person, place, and time.      Cranial Nerves: No cranial nerve deficit.   Psychiatric:         Judgment: Judgment normal.           Results for orders placed or performed in visit on 12/08/20   C-reactive Protein    Specimen: Blood    BLOOD  MANUAL DIFFEREN   Result Value Ref Range    C-Reactive Protein 0.34 0.00 - 0.50 mg/dL   Sedimentation Rate    Specimen: Blood    BLOOD  MANUAL DIFFEREN   Result Value Ref Range    Sed Rate 9 0 - 30 mm/hr   Nuclear Antigen Antibody, IFA    Specimen: Blood    BLOOD  MANUAL DIFFEREN   Result Value Ref Range    KEEGAN Negative    KEEGAN With / DsDNA, RNP, Sjogrens A / B, Crump    Specimen: Blood    BLOOD  MANUAL DIFFEREN   Result Value Ref Range    KEEGAN Direct Negative Negative   CBC & Differential    Specimen: Blood    BLOOD  MANUAL DIFFEREN   Result Value Ref Range    WBC 5.07 3.40 - 10.80 10*3/mm3    RBC 4.20 3.77 - 5.28 10*6/mm3    Hemoglobin 13.9 12.0 - 15.9 g/dL    Hematocrit 41.9 34.0 - 46.6 %    MCV 99.8 (H) 79.0 - 97.0 fL    MCH 33.1 (H) 26.6 - 33.0 pg    MCHC 33.2 31.5 - 35.7 g/dL    RDW 11.8 (L) 12.3 - 15.4 %    Platelets  284 140 - 450 10*3/mm3    Neutrophil Rel % 58.6 42.7 - 76.0 %    Lymphocyte Rel % 23.7 19.6 - 45.3 %    Monocyte Rel % 11.2 5.0 - 12.0 %    Eosinophil Rel % 5.5 0.3 - 6.2 %    Basophil Rel % 0.8 0.0 - 1.5 %    Neutrophils Absolute 2.97 1.70 - 7.00 10*3/mm3    Lymphocytes Absolute 1.20 0.70 - 3.10 10*3/mm3    Monocytes Absolute 0.57 0.10 - 0.90 10*3/mm3    Eosinophils Absolute 0.28 0.00 - 0.40 10*3/mm3    Basophils Absolute 0.04 0.00 - 0.20 10*3/mm3    Immature Granulocyte Rel % 0.2 0.0 - 0.5 %    Immature Grans Absolute 0.01 0.00 - 0.05 10*3/mm3    nRBC 0.0 0.0 - 0.2 /100 WBC             Assessment/Plan   Diagnoses and all orders for this visit:    Rash  -     predniSONE (DELTASONE) 20 MG tablet; Take 1 tabs BID x 3 days then 1&1/2 tab daily x 3 days then 1 tab daily x 3 days then 1/2 tab daily x 3days then stop.  -     KEEGAN With / DsDNA, RNP, Sjogrens A / B, Crump; Future  -     Nuclear Antigen Antibody, IFA; Future  -     Sedimentation Rate; Future  -     C-reactive Protein; Future  -     CBC & Differential; Future  -     IgE; Future  -     IgE  -     CBC & Differential  -     C-reactive Protein  -     Sedimentation Rate  -     Nuclear Antigen Antibody, IFA  -     KEEGAN With / DsDNA, RNP, Sjogrens A / B, Smith    Iliopsoas bursitis of right hip  -     Ambulatory Referral to Pain Management         Return for Next scheduled follow up.

## 2020-12-09 LAB
ANA SER QL: NEGATIVE
ANA TITR SER IF: NEGATIVE {TITER}
BASOPHILS # BLD AUTO: 0.04 10*3/MM3 (ref 0–0.2)
BASOPHILS NFR BLD AUTO: 0.8 % (ref 0–1.5)
CRP SERPL-MCNC: 0.34 MG/DL (ref 0–0.5)
EOSINOPHIL # BLD AUTO: 0.28 10*3/MM3 (ref 0–0.4)
EOSINOPHIL NFR BLD AUTO: 5.5 % (ref 0.3–6.2)
ERYTHROCYTE [DISTWIDTH] IN BLOOD BY AUTOMATED COUNT: 11.8 % (ref 12.3–15.4)
ERYTHROCYTE [SEDIMENTATION RATE] IN BLOOD BY WESTERGREN METHOD: 9 MM/HR (ref 0–30)
HCT VFR BLD AUTO: 41.9 % (ref 34–46.6)
HGB BLD-MCNC: 13.9 G/DL (ref 12–15.9)
IMM GRANULOCYTES # BLD AUTO: 0.01 10*3/MM3 (ref 0–0.05)
IMM GRANULOCYTES NFR BLD AUTO: 0.2 % (ref 0–0.5)
LYMPHOCYTES # BLD AUTO: 1.2 10*3/MM3 (ref 0.7–3.1)
LYMPHOCYTES NFR BLD AUTO: 23.7 % (ref 19.6–45.3)
MCH RBC QN AUTO: 33.1 PG (ref 26.6–33)
MCHC RBC AUTO-ENTMCNC: 33.2 G/DL (ref 31.5–35.7)
MCV RBC AUTO: 99.8 FL (ref 79–97)
MONOCYTES # BLD AUTO: 0.57 10*3/MM3 (ref 0.1–0.9)
MONOCYTES NFR BLD AUTO: 11.2 % (ref 5–12)
NEUTROPHILS # BLD AUTO: 2.97 10*3/MM3 (ref 1.7–7)
NEUTROPHILS NFR BLD AUTO: 58.6 % (ref 42.7–76)
NRBC BLD AUTO-RTO: 0 /100 WBC (ref 0–0.2)
PLATELET # BLD AUTO: 284 10*3/MM3 (ref 140–450)
RBC # BLD AUTO: 4.2 10*6/MM3 (ref 3.77–5.28)
WBC # BLD AUTO: 5.07 10*3/MM3 (ref 3.4–10.8)

## 2021-01-11 ENCOUNTER — OFFICE VISIT (OUTPATIENT)
Dept: INTERNAL MEDICINE | Facility: CLINIC | Age: 76
End: 2021-01-11

## 2021-01-11 VITALS
HEIGHT: 65 IN | SYSTOLIC BLOOD PRESSURE: 120 MMHG | DIASTOLIC BLOOD PRESSURE: 70 MMHG | BODY MASS INDEX: 22.36 KG/M2 | OXYGEN SATURATION: 95 % | HEART RATE: 96 BPM | TEMPERATURE: 97.5 F | WEIGHT: 134.2 LBS

## 2021-01-11 DIAGNOSIS — R21 RASH: ICD-10-CM

## 2021-01-11 DIAGNOSIS — K21.9 GASTROESOPHAGEAL REFLUX DISEASE WITHOUT ESOPHAGITIS: ICD-10-CM

## 2021-01-11 DIAGNOSIS — M70.71 ILIOPSOAS BURSITIS OF RIGHT HIP: Primary | ICD-10-CM

## 2021-01-11 DIAGNOSIS — M81.0 POSTMENOPAUSAL OSTEOPOROSIS: ICD-10-CM

## 2021-01-11 DIAGNOSIS — M79.671 ACUTE PAIN OF RIGHT FOOT: ICD-10-CM

## 2021-01-11 PROCEDURE — 99214 OFFICE O/P EST MOD 30 MIN: CPT | Performed by: INTERNAL MEDICINE

## 2021-01-11 RX ORDER — FAMOTIDINE 40 MG/1
40 TABLET, FILM COATED ORAL DAILY
Qty: 30 TABLET | Refills: 2 | Status: SHIPPED | OUTPATIENT
Start: 2021-01-11 | End: 2021-01-18

## 2021-01-11 RX ORDER — MONTELUKAST SODIUM 10 MG/1
TABLET ORAL
Qty: 30 TABLET | Refills: 5 | Status: SHIPPED | OUTPATIENT
Start: 2021-01-11 | End: 2021-08-03 | Stop reason: SDUPTHER

## 2021-01-11 NOTE — PROGRESS NOTES
Rash (inner thighs)    Subjective   Maura Barry is a 75 y.o. female is here today for follow-up.    History of Present Illness   Rash better after pred taper , but came right back. Has an appointment with Dr. Dias for food allergy testing.  Given diclofenac 50 bid x 2 weeks for her back.   Worried about osteoporosis, and to get dexa.  Hurt her rt foot, after exercising. Now painful to walk, a week into it.        Current Outpatient Medications:   •  ALLERGY SERUM INJECTION, Inject  under the skin 1 (One) Time Per Week., Disp: , Rfl:   •  Ascorbic Acid (VITAMIN C) 500 MG capsule, Take 1,000 mg by mouth Daily., Disp: , Rfl:   •  buPROPion XL (WELLBUTRIN XL) 300 MG 24 hr tablet, Take 1 tablet by mouth Daily., Disp: , Rfl: 2  •  Calcium-Magnesium (CALCIUM MAGNESIUM 750) 300-300 MG tablet, Take 2 tablets by mouth Daily., Disp: , Rfl:   •  Cholecalciferol (VITAMIN D3) 5000 units capsule capsule, Take 5,000 Units by mouth Daily., Disp: , Rfl:   •  Cyanocobalamin 1000 MCG sublingual tablet, Place 1 tablet under the tongue Daily., Disp: 30 each, Rfl: 5  •  fluticasone (FLONASE) 50 MCG/ACT nasal spray, 1 spray into the nostril(s) as directed by provider 2 (two) times a day., Disp: 1 bottle, Rfl: 5  •  Glucosamine HCl 1000 MG tablet, Take 2,000 mg by mouth Daily., Disp: , Rfl:   •  magnesium oxide (MAGOX) 400 (241.3 Mg) MG tablet tablet, Take 400 mg by mouth Daily., Disp: , Rfl:   •  memantine (NAMENDA) 10 MG tablet, Take 10 mg by mouth 2 (Two) Times a Day., Disp: , Rfl:   •  Omega-3 Fatty Acids (FISH OIL) 1000 MG capsule capsule, Take 1,000 mg by mouth Daily With Breakfast., Disp: , Rfl:   •  pantoprazole (PROTONIX) 40 MG EC tablet, Take 1 tablet by mouth Daily., Disp: 90 tablet, Rfl: 3  •  traZODone (DESYREL) 50 MG tablet, Take 2 tablets by mouth Every Night., Disp: 180 tablet, Rfl: 1  •  triamcinolone (KENALOG) 0.025 % cream, Apply  topically to the appropriate area as directed 2 (Two) Times a Day., Disp: 80 g, Rfl:  "5  •  VIIBRYD 20 MG tablet tablet, Take 1 tablet by mouth Daily., Disp: , Rfl:   •  zoledronic acid (ZOMETA) 4 MG/100ML solution infusion (premix), Infuse 4 mg into a venous catheter 1 (One) Time., Disp: , Rfl:   •  Diclofenac Sodium (VOLTAREN) 1 % gel gel, Apply 4 g topically to the appropriate area as directed 3 (Three) Times a Day., Disp: 100 g, Rfl: 2  •  famotidine (PEPCID) 40 MG tablet, TAKE 1 TABLET BY MOUTH DAILY, Disp: 30 tablet, Rfl: 5  •  montelukast (Singulair) 10 MG tablet, One PO daily, Disp: 30 tablet, Rfl: 5  •  promethazine-dextromethorphan (PROMETHAZINE-DM) 6.25-15 MG/5ML syrup, Take 5 mL by mouth 4 (Four) Times a Day As Needed for Cough., Disp: 240 mL, Rfl: 0  •  pseudoephedrine (Sudafed 12 Hour) 120 MG 12 hr tablet, Take 1 tablet by mouth Every 12 (Twelve) Hours., Disp: 60 tablet, Rfl: 5      The following portions of the patient's history were reviewed and updated as appropriate: allergies, current medications, past family history, past medical history, past social history, past surgical history and problem list.    Review of Systems   Constitutional: Negative.  Negative for chills and fever.   HENT: Negative for ear discharge, ear pain, sinus pressure and sore throat.    Respiratory: Negative for cough, chest tightness and shortness of breath.    Cardiovascular: Negative for chest pain, palpitations and leg swelling.   Gastrointestinal: Negative for diarrhea, nausea and vomiting.   Musculoskeletal: Positive for arthralgias (foot pain, rt) and back pain. Negative for myalgias.   Skin: Positive for rash.   Neurological: Negative for dizziness, syncope and headaches.   Psychiatric/Behavioral: Negative for confusion and sleep disturbance.       Objective   /70   Pulse 96   Temp 97.5 °F (36.4 °C)   Ht 165.1 cm (65\")   Wt 60.9 kg (134 lb 3.2 oz)   SpO2 95% Comment: ra  BMI 22.33 kg/m²   Physical Exam      Results for orders placed or performed in visit on 12/08/20   C-reactive Protein    " Specimen: Blood    BLOOD  MANUAL DIFFEREN   Result Value Ref Range    C-Reactive Protein 0.34 0.00 - 0.50 mg/dL   Sedimentation Rate    Specimen: Blood    BLOOD  MANUAL DIFFEREN   Result Value Ref Range    Sed Rate 9 0 - 30 mm/hr   Nuclear Antigen Antibody, IFA    Specimen: Blood    BLOOD  MANUAL DIFFEREN   Result Value Ref Range    KEEGAN Negative    KEEGAN With / DsDNA, RNP, Sjogrens A / B, Crump    Specimen: Blood    BLOOD  MANUAL DIFFEREN   Result Value Ref Range    KEEGAN Direct Negative Negative   CBC & Differential    Specimen: Blood    BLOOD  MANUAL DIFFEREN   Result Value Ref Range    WBC 5.07 3.40 - 10.80 10*3/mm3    RBC 4.20 3.77 - 5.28 10*6/mm3    Hemoglobin 13.9 12.0 - 15.9 g/dL    Hematocrit 41.9 34.0 - 46.6 %    MCV 99.8 (H) 79.0 - 97.0 fL    MCH 33.1 (H) 26.6 - 33.0 pg    MCHC 33.2 31.5 - 35.7 g/dL    RDW 11.8 (L) 12.3 - 15.4 %    Platelets 284 140 - 450 10*3/mm3    Neutrophil Rel % 58.6 42.7 - 76.0 %    Lymphocyte Rel % 23.7 19.6 - 45.3 %    Monocyte Rel % 11.2 5.0 - 12.0 %    Eosinophil Rel % 5.5 0.3 - 6.2 %    Basophil Rel % 0.8 0.0 - 1.5 %    Neutrophils Absolute 2.97 1.70 - 7.00 10*3/mm3    Lymphocytes Absolute 1.20 0.70 - 3.10 10*3/mm3    Monocytes Absolute 0.57 0.10 - 0.90 10*3/mm3    Eosinophils Absolute 0.28 0.00 - 0.40 10*3/mm3    Basophils Absolute 0.04 0.00 - 0.20 10*3/mm3    Immature Granulocyte Rel % 0.2 0.0 - 0.5 %    Immature Grans Absolute 0.01 0.00 - 0.05 10*3/mm3    nRBC 0.0 0.0 - 0.2 /100 WBC             Assessment/Plan   Diagnoses and all orders for this visit:    Iliopsoas bursitis of right hip  -     Diclofenac Sodium (VOLTAREN) 1 % gel gel; Apply 4 g topically to the appropriate area as directed 3 (Three) Times a Day.    Gastroesophageal reflux disease without esophagitis  -     Discontinue: famotidine (PEPCID) 40 MG tablet; Take 1 tablet by mouth Daily.    Rash  -     Discontinue: famotidine (PEPCID) 40 MG tablet; Take 1 tablet by mouth Daily.  -     montelukast (Singulair) 10 MG  tablet; One PO daily    Acute pain of right foot  Comments:  lateral aspect under lat malleolus. ace wrap, voltaren gel, and if ot better- get xrays  Orders:  -     XR Foot 3+ View Right; Future  -     Ambulatory Referral to Physical Therapy Evaluate and treat    see Dr.. Bell if rash not better.    Epsom salt soaks for feet.       Last reclast infusion was 4/7/2020      Return for Next scheduled follow up.  Answers for HPI/ROS submitted by the patient on 1/4/2021   Rash  What is the primary reason for your visit?: Rash

## 2021-01-12 ENCOUNTER — HOSPITAL ENCOUNTER (OUTPATIENT)
Dept: GENERAL RADIOLOGY | Facility: HOSPITAL | Age: 76
Discharge: HOME | End: 2021-01-12
Admitting: INTERNAL MEDICINE

## 2021-01-12 DIAGNOSIS — M79.671 ACUTE PAIN OF RIGHT FOOT: ICD-10-CM

## 2021-01-12 PROCEDURE — 73630 X-RAY EXAM OF FOOT: CPT

## 2021-01-17 DIAGNOSIS — R21 RASH: ICD-10-CM

## 2021-01-17 DIAGNOSIS — K21.9 GASTROESOPHAGEAL REFLUX DISEASE WITHOUT ESOPHAGITIS: ICD-10-CM

## 2021-01-18 RX ORDER — FAMOTIDINE 40 MG/1
40 TABLET, FILM COATED ORAL DAILY
Qty: 30 TABLET | Refills: 5 | Status: SHIPPED | OUTPATIENT
Start: 2021-01-18 | End: 2021-12-16 | Stop reason: SDUPTHER

## 2021-01-18 NOTE — TELEPHONE ENCOUNTER
Last Office Visit: 1/11/21  Next Office Visit:9/29/21    Labs completed in past 6 months? yes  Labs completed in past year? yes    Last Refill Date:1/11/21  Quantity:30  Refills:2    Pharmacy:

## 2021-01-27 ENCOUNTER — TELEPHONE (OUTPATIENT)
Dept: INTERNAL MEDICINE | Facility: CLINIC | Age: 76
End: 2021-01-27

## 2021-02-01 ENCOUNTER — HOSPITAL ENCOUNTER (OUTPATIENT)
Dept: BONE DENSITY | Facility: HOSPITAL | Age: 76
Discharge: HOME | End: 2021-02-01

## 2021-02-01 ENCOUNTER — HOSPITAL ENCOUNTER (OUTPATIENT)
Dept: MAMMOGRAPHY | Facility: HOSPITAL | Age: 76
Discharge: HOME | End: 2021-02-01

## 2021-02-01 DIAGNOSIS — Z12.31 VISIT FOR SCREENING MAMMOGRAM: ICD-10-CM

## 2021-02-01 DIAGNOSIS — M81.0 POSTMENOPAUSAL OSTEOPOROSIS: ICD-10-CM

## 2021-02-01 PROCEDURE — 77063 BREAST TOMOSYNTHESIS BI: CPT

## 2021-02-01 PROCEDURE — 77063 BREAST TOMOSYNTHESIS BI: CPT | Performed by: RADIOLOGY

## 2021-02-01 PROCEDURE — 77080 DXA BONE DENSITY AXIAL: CPT

## 2021-02-01 PROCEDURE — 77067 SCR MAMMO BI INCL CAD: CPT

## 2021-02-01 PROCEDURE — 77067 SCR MAMMO BI INCL CAD: CPT | Performed by: RADIOLOGY

## 2021-02-24 ENCOUNTER — PATIENT MESSAGE (OUTPATIENT)
Dept: INTERNAL MEDICINE | Facility: CLINIC | Age: 76
End: 2021-02-24

## 2021-02-24 DIAGNOSIS — G89.29 CHRONIC RIGHT-SIDED LOW BACK PAIN WITHOUT SCIATICA: ICD-10-CM

## 2021-02-24 DIAGNOSIS — J30.89 SEASONAL ALLERGIC RHINITIS DUE TO OTHER ALLERGIC TRIGGER: ICD-10-CM

## 2021-02-24 DIAGNOSIS — M54.50 CHRONIC RIGHT-SIDED LOW BACK PAIN WITHOUT SCIATICA: ICD-10-CM

## 2021-02-24 RX ORDER — FLUTICASONE PROPIONATE 50 MCG
1 SPRAY, SUSPENSION (ML) NASAL 2 TIMES DAILY
Qty: 16 G | Refills: 5 | Status: SHIPPED | OUTPATIENT
Start: 2021-02-24 | End: 2021-12-16 | Stop reason: SDUPTHER

## 2021-02-25 NOTE — TELEPHONE ENCOUNTER
Docras Coronel 2/24/2021 7:55 AM EST      ----- Message -----  From: Maura Barry  Sent: 2/24/2021 7:19 AM EST  To: [facility] [place] Clinical Pool  Subject: Non-Urgent Medical Question     Dr. MARTINEZ, I ran out of my Flonase prescription. I still have a few bottles of Azelastine nasal solution which you prescribed last summer during the height of the allergy season. Shall I have my Flonase refilled and save the Azelastine for this summer or go ahead and use the Azelastine now. My allergy sx are mild for now. Please advise. Thanks, JULIETH Barry

## 2021-03-15 ENCOUNTER — TELEPHONE (OUTPATIENT)
Dept: INTERNAL MEDICINE | Facility: CLINIC | Age: 76
End: 2021-03-15

## 2021-03-15 NOTE — TELEPHONE ENCOUNTER
Debby, this is her annual Reclast. Do I have to place a consult every year?  The infusion center said she could call [phone] to schedule.    If that's right, can you let the patient know?    Thanks

## 2021-04-02 ENCOUNTER — TELEPHONE (OUTPATIENT)
Dept: INTERNAL MEDICINE | Facility: CLINIC | Age: 76
End: 2021-04-02

## 2021-04-02 RX ORDER — ZOLEDRONIC ACID 0.04 MG/ML
4 INJECTION, SOLUTION INTRAVENOUS ONCE
Qty: 100 ML | Refills: 0
Start: 2021-04-03 | End: 2021-04-03

## 2021-04-03 NOTE — TELEPHONE ENCOUNTER
----- Message from Geena Gonzalez sent at 4/2/2021  8:25 AM EDT -----  New reclast order needed, thank you

## 2021-04-09 ENCOUNTER — HOSPITAL ENCOUNTER (OUTPATIENT)
Dept: ONCOLOGY | Facility: HOSPITAL | Age: 76
Setting detail: INFUSION SERIES
Discharge: HOME | End: 2021-04-09

## 2021-04-09 VITALS
HEIGHT: 65 IN | HEART RATE: 110 BPM | WEIGHT: 133 LBS | SYSTOLIC BLOOD PRESSURE: 98 MMHG | RESPIRATION RATE: 16 BRPM | DIASTOLIC BLOOD PRESSURE: 57 MMHG | TEMPERATURE: 97.8 F | BODY MASS INDEX: 22.16 KG/M2

## 2021-04-09 DIAGNOSIS — M81.0 MENOPAUSAL OSTEOPOROSIS: Primary | ICD-10-CM

## 2021-04-09 PROCEDURE — 96374 THER/PROPH/DIAG INJ IV PUSH: CPT

## 2021-04-09 PROCEDURE — 25010000002 ZOLEDRONIC ACID PER 1 MG: Performed by: INTERNAL MEDICINE

## 2021-04-09 RX ORDER — SODIUM CHLORIDE 9 MG/ML
250 INJECTION, SOLUTION INTRAVENOUS ONCE
Status: CANCELLED | OUTPATIENT
Start: 2021-04-09

## 2021-04-09 RX ORDER — SODIUM CHLORIDE 9 MG/ML
250 INJECTION, SOLUTION INTRAVENOUS ONCE
Status: COMPLETED | OUTPATIENT
Start: 2021-04-09 | End: 2021-04-09

## 2021-04-09 RX ADMIN — SODIUM CHLORIDE 250 ML: 9 INJECTION, SOLUTION INTRAVENOUS at 13:55

## 2021-04-09 RX ADMIN — ZOLEDRONIC ACID 4 MG: 4 INJECTION, SOLUTION, CONCENTRATE INTRAVENOUS at 13:56

## 2021-05-04 ENCOUNTER — OFFICE VISIT (OUTPATIENT)
Dept: INTERNAL MEDICINE | Facility: CLINIC | Age: 76
End: 2021-05-04

## 2021-05-04 VITALS
WEIGHT: 132 LBS | OXYGEN SATURATION: 95 % | HEART RATE: 86 BPM | SYSTOLIC BLOOD PRESSURE: 110 MMHG | BODY MASS INDEX: 21.99 KG/M2 | TEMPERATURE: 96.9 F | DIASTOLIC BLOOD PRESSURE: 62 MMHG | HEIGHT: 65 IN

## 2021-05-04 DIAGNOSIS — M79.5 FOREIGN BODY (FB) IN SOFT TISSUE: ICD-10-CM

## 2021-05-04 DIAGNOSIS — M65.271 CALCIFIC TENDINITIS OF RIGHT ANKLE: Primary | ICD-10-CM

## 2021-05-04 PROCEDURE — 10120 INC&RMVL FB SUBQ TISS SMPL: CPT | Performed by: NURSE PRACTITIONER

## 2021-05-04 PROCEDURE — 99213 OFFICE O/P EST LOW 20 MIN: CPT | Performed by: NURSE PRACTITIONER

## 2021-05-10 DIAGNOSIS — F51.01 PRIMARY INSOMNIA: ICD-10-CM

## 2021-05-10 RX ORDER — TRAZODONE HYDROCHLORIDE 50 MG/1
100 TABLET ORAL NIGHTLY
Qty: 180 TABLET | Refills: 1 | Status: SHIPPED | OUTPATIENT
Start: 2021-05-10 | End: 2022-01-31

## 2021-05-10 NOTE — TELEPHONE ENCOUNTER
Last Office Visit: 1/11/21  Next Office Visit: 9/29/21    Labs completed in past 6 months? no  Labs completed in past year? yes    Last Refill Date:9/23/20  Quantity:180  Refills:1    Pharmacy:     Please review pended refill request for any changes needed on refills or quantities. Thank you!

## 2021-06-14 ENCOUNTER — OFFICE VISIT (OUTPATIENT)
Dept: INTERNAL MEDICINE | Facility: CLINIC | Age: 76
End: 2021-06-14

## 2021-06-14 VITALS
SYSTOLIC BLOOD PRESSURE: 110 MMHG | DIASTOLIC BLOOD PRESSURE: 72 MMHG | BODY MASS INDEX: 22.2 KG/M2 | OXYGEN SATURATION: 96 % | HEART RATE: 94 BPM | WEIGHT: 133.4 LBS

## 2021-06-14 DIAGNOSIS — N63.24 UNSPECIFIED LUMP IN THE LEFT BREAST, LOWER INNER QUADRANT: Primary | ICD-10-CM

## 2021-06-14 PROCEDURE — 99213 OFFICE O/P EST LOW 20 MIN: CPT | Performed by: PHYSICIAN ASSISTANT

## 2021-06-14 RX ORDER — IPRATROPIUM BROMIDE 21 UG/1
SPRAY, METERED NASAL
COMMUNITY
End: 2022-09-03

## 2021-06-14 NOTE — PROGRESS NOTES
Chief Complaint   Patient presents with   • Lump in left breast     Acute        Subjective     Mauranikita Barry is a 75 y.o. female.        History of Present Illness     Pt noticed a lump in her left medial breast yesterday when she putting some cortisone cream on an itchy spot in the area. She had mammogram in February that was normal. No family history of abnormal mammograms or breast cancer. Pt has history of fibroglandular dense breast tissue and has had to repeat mammograms. Otherwise she is feeling well.        Current Outpatient Medications:   •  ALLERGY SERUM INJECTION, Inject  under the skin 1 (One) Time Per Week., Disp: , Rfl:   •  Ascorbic Acid (VITAMIN C) 500 MG capsule, Take 1,000 mg by mouth Daily., Disp: , Rfl:   •  buPROPion XL (WELLBUTRIN XL) 300 MG 24 hr tablet, Take 1 tablet by mouth Daily., Disp: , Rfl: 2  •  Calcium-Magnesium (CALCIUM MAGNESIUM 750) 300-300 MG tablet, Take 2 tablets by mouth Daily., Disp: , Rfl:   •  Cholecalciferol (VITAMIN D3) 5000 units capsule capsule, Take 5,000 Units by mouth Daily., Disp: , Rfl:   •  Cyanocobalamin 1000 MCG sublingual tablet, Place 1 tablet under the tongue Daily., Disp: 30 each, Rfl: 5  •  Diclofenac Sodium (VOLTAREN) 1 % gel gel, Apply 4 g topically to the appropriate area as directed 3 (Three) Times a Day., Disp: 100 g, Rfl: 2  •  famotidine (PEPCID) 40 MG tablet, TAKE 1 TABLET BY MOUTH DAILY, Disp: 30 tablet, Rfl: 5  •  fluticasone (FLONASE) 50 MCG/ACT nasal spray, 1 spray into the nostril(s) as directed by provider 2 (two) times a day., Disp: 16 g, Rfl: 5  •  Glucosamine HCl 1000 MG tablet, Take 2,000 mg by mouth Daily., Disp: , Rfl:   •  ipratropium (ATROVENT) 0.03 % nasal spray, ipratropium bromide 21 mcg (0.03 %) nasal spray, Disp: , Rfl:   •  magnesium oxide (MAGOX) 400 (241.3 Mg) MG tablet tablet, Take 400 mg by mouth Daily., Disp: , Rfl:   •  memantine (NAMENDA) 10 MG tablet, Take 10 mg by mouth 2 (Two) Times a Day., Disp: , Rfl:   •   montelukast (Singulair) 10 MG tablet, One PO daily, Disp: 30 tablet, Rfl: 5  •  Omega-3 Fatty Acids (FISH OIL) 1000 MG capsule capsule, Take 1,000 mg by mouth Daily With Breakfast., Disp: , Rfl:   •  pantoprazole (PROTONIX) 40 MG EC tablet, Take 1 tablet by mouth Daily., Disp: 90 tablet, Rfl: 3  •  pseudoephedrine (Sudafed 12 Hour) 120 MG 12 hr tablet, Take 1 tablet by mouth Every 12 (Twelve) Hours., Disp: 60 tablet, Rfl: 5  •  traZODone (DESYREL) 50 MG tablet, Take 2 tablets by mouth Every Night., Disp: 180 tablet, Rfl: 1  •  triamcinolone (KENALOG) 0.025 % cream, Apply  topically to the appropriate area as directed 2 (Two) Times a Day., Disp: 80 g, Rfl: 5  •  VIIBRYD 20 MG tablet tablet, Take 1 tablet by mouth Daily., Disp: , Rfl:   •  zoledronic acid (ZOMETA) 4 MG/100ML solution infusion (premix), Infuse 100 mL into a venous catheter 1 (One) Time for 1 dose., Disp: 100 mL, Rfl: 0     PMFSH  The following portions of the patient's history were reviewed and updated as appropriate: allergies, current medications, past family history, past medical history, past social history, past surgical history and problem list.    Review of Systems   Constitutional: Negative for activity change, appetite change and fatigue.   HENT: Negative for congestion and rhinorrhea.    Respiratory: Negative for chest tightness and shortness of breath.    Cardiovascular: Negative for chest pain and palpitations.   Gastrointestinal: Negative for abdominal pain.   Genitourinary: Negative for dysuria.   Musculoskeletal: Negative for arthralgias and myalgias.   Neurological: Negative for dizziness, weakness, light-headedness and headaches.   Psychiatric/Behavioral: Negative for dysphoric mood. The patient is not nervous/anxious.        Objective   /72   Pulse 94   Wt 60.5 kg (133 lb 6.4 oz)   SpO2 96%   BMI 22.20 kg/m²     Physical Exam  Vitals and nursing note reviewed.   Constitutional:       Appearance: She is well-developed.    HENT:      Head: Normocephalic and atraumatic.      Right Ear: External ear normal.      Left Ear: External ear normal.   Eyes:      Conjunctiva/sclera: Conjunctivae normal.   Cardiovascular:      Rate and Rhythm: Normal rate and regular rhythm.   Pulmonary:      Effort: Pulmonary effort is normal.      Breath sounds: Normal breath sounds.   Chest:       Musculoskeletal:         General: Normal range of motion.      Cervical back: Normal range of motion.   Skin:     General: Skin is warm and dry.   Psychiatric:         Behavior: Behavior normal.              ASSESSMENT/PLAN    Diagnoses and all orders for this visit:    1. Unspecified lump in the left breast, lower inner quadrant  (Primary)  Comments:  Refer for diagnostic mammogram with next steps per mammography recommendations. Pt will call the office if she has not heard about scheduling.  Orders:  -     Mammo diagnostic digital tomosynthesis bilateral w CAD; Future             Return if symptoms worsen or fail to improve.

## 2021-06-22 ENCOUNTER — HOSPITAL ENCOUNTER (OUTPATIENT)
Dept: ULTRASOUND IMAGING | Facility: HOSPITAL | Age: 76
Discharge: HOME | End: 2021-06-22

## 2021-06-22 ENCOUNTER — HOSPITAL ENCOUNTER (OUTPATIENT)
Dept: MAMMOGRAPHY | Facility: HOSPITAL | Age: 76
Discharge: HOME | End: 2021-06-22

## 2021-06-22 DIAGNOSIS — N63.24 UNSPECIFIED LUMP IN THE LEFT BREAST, LOWER INNER QUADRANT: ICD-10-CM

## 2021-06-22 PROCEDURE — G0279 TOMOSYNTHESIS, MAMMO: HCPCS | Performed by: RADIOLOGY

## 2021-06-22 PROCEDURE — 76642 ULTRASOUND BREAST LIMITED: CPT

## 2021-06-22 PROCEDURE — 77065 DX MAMMO INCL CAD UNI: CPT | Performed by: RADIOLOGY

## 2021-06-22 PROCEDURE — 77065 DX MAMMO INCL CAD UNI: CPT

## 2021-06-22 PROCEDURE — G0279 TOMOSYNTHESIS, MAMMO: HCPCS

## 2021-06-22 PROCEDURE — 76642 ULTRASOUND BREAST LIMITED: CPT | Performed by: RADIOLOGY

## 2021-06-25 NOTE — PROGRESS NOTES
I have reviewed the notes, assessments, and/or procedures performed by Pamela Gracia PA-C, I concur with her/his documentation of Maura Barry.

## 2021-07-06 ENCOUNTER — TELEPHONE (OUTPATIENT)
Dept: INTERNAL MEDICINE | Facility: CLINIC | Age: 76
End: 2021-07-06

## 2021-07-06 NOTE — TELEPHONE ENCOUNTER
Yes, all those side effects can be related to viibryd.  Not sure about the sodium issue.  Can she come in to discuss?

## 2021-07-06 NOTE — TELEPHONE ENCOUNTER
Caller: Maura Barry    Relationship: Self    Best call back number: [phone]    What was the call regarding:   PATIENT WOULD LIKE A CALL BACK REGARDING QUESTION'S TO SIDE EFFECTS FROM HER MEDICATION   VIIBRYD 20 MG tablet tablet    PATIENT STATED THAT SHE IS HAVING CONATIVE CHANGES, DECREASED MEMORY AND MEMORY LOSS     PATIENT WOULD LIKE LABS PLACED FOR HER GET HER SODIUM CHECKED       Do you require a callback: YES

## 2021-07-06 NOTE — TELEPHONE ENCOUNTER
Very unsteady lately, and warnings on medication states low sodium can be caused by this medication and worried this is her problem.  Pt will call and let psychiatrist know, but is wanting you to order the labs.    Please advise

## 2021-07-07 ENCOUNTER — LAB (OUTPATIENT)
Dept: LAB | Facility: HOSPITAL | Age: 76
End: 2021-07-07

## 2021-07-07 ENCOUNTER — OFFICE VISIT (OUTPATIENT)
Dept: INTERNAL MEDICINE | Facility: CLINIC | Age: 76
End: 2021-07-07

## 2021-07-07 VITALS
HEIGHT: 65 IN | OXYGEN SATURATION: 99 % | TEMPERATURE: 97.8 F | HEART RATE: 78 BPM | DIASTOLIC BLOOD PRESSURE: 70 MMHG | WEIGHT: 131.6 LBS | BODY MASS INDEX: 21.92 KG/M2 | SYSTOLIC BLOOD PRESSURE: 118 MMHG

## 2021-07-07 DIAGNOSIS — E61.1 IRON DEFICIENCY: ICD-10-CM

## 2021-07-07 DIAGNOSIS — R31.9 HEMATURIA, UNSPECIFIED TYPE: ICD-10-CM

## 2021-07-07 DIAGNOSIS — R42 DIZZINESS: Primary | ICD-10-CM

## 2021-07-07 DIAGNOSIS — R53.83 OTHER FATIGUE: ICD-10-CM

## 2021-07-07 DIAGNOSIS — R42 DIZZINESS: ICD-10-CM

## 2021-07-07 LAB
BILIRUB BLD-MCNC: NEGATIVE MG/DL
CLARITY, POC: CLEAR
COLOR UR: YELLOW
GLUCOSE UR STRIP-MCNC: NEGATIVE MG/DL
KETONES UR QL: NEGATIVE
LEUKOCYTE EST, POC: NEGATIVE
NITRITE UR-MCNC: NEGATIVE MG/ML
PH UR: 5.5 [PH] (ref 5–8)
PROT UR STRIP-MCNC: NEGATIVE MG/DL
RBC # UR STRIP: ABNORMAL /UL
SP GR UR: 1.03 (ref 1–1.03)
UROBILINOGEN UR QL: NORMAL

## 2021-07-07 PROCEDURE — 81003 URINALYSIS AUTO W/O SCOPE: CPT | Performed by: INTERNAL MEDICINE

## 2021-07-07 PROCEDURE — 99214 OFFICE O/P EST MOD 30 MIN: CPT | Performed by: INTERNAL MEDICINE

## 2021-07-07 NOTE — TELEPHONE ENCOUNTER
I would like to see her- can do tomorrow at 11.45 pm or thursday at 11.15 am overbook, as she may need other tests - like urinalysis and more labs etc to completely evaluate her symptoms.    thanks

## 2021-07-07 NOTE — PROGRESS NOTES
Dizziness    Subjective   Maura Barry is a 75 y.o. female is here today for follow-up.    History of Present Illness   Has been on Viibryd x 12 years, and has been more dizzy and light headed and weak, initially.  Has constant frequency, no burning or any other abnormalty.  Seen by Dr. Mccord .    Current Outpatient Medications:   •  ALLERGY SERUM INJECTION, Inject  under the skin 1 (One) Time Per Week., Disp: , Rfl:   •  Ascorbic Acid (VITAMIN C) 500 MG capsule, Take 1,000 mg by mouth Daily., Disp: , Rfl:   •  buPROPion XL (WELLBUTRIN XL) 300 MG 24 hr tablet, Take 1 tablet by mouth Daily., Disp: , Rfl: 2  •  Calcium-Magnesium (CALCIUM MAGNESIUM 750) 300-300 MG tablet, Take 2 tablets by mouth Daily., Disp: , Rfl:   •  Cholecalciferol (VITAMIN D3) 5000 units capsule capsule, Take 5,000 Units by mouth Daily., Disp: , Rfl:   •  Cyanocobalamin 1000 MCG sublingual tablet, Place 1 tablet under the tongue Daily., Disp: 30 each, Rfl: 5  •  Diclofenac Sodium (VOLTAREN) 1 % gel gel, Apply 4 g topically to the appropriate area as directed 3 (Three) Times a Day., Disp: 100 g, Rfl: 2  •  famotidine (PEPCID) 40 MG tablet, TAKE 1 TABLET BY MOUTH DAILY, Disp: 30 tablet, Rfl: 5  •  fluticasone (FLONASE) 50 MCG/ACT nasal spray, 1 spray into the nostril(s) as directed by provider 2 (two) times a day., Disp: 16 g, Rfl: 5  •  Glucosamine HCl 1000 MG tablet, Take 2,000 mg by mouth Daily., Disp: , Rfl:   •  ipratropium (ATROVENT) 0.03 % nasal spray, ipratropium bromide 21 mcg (0.03 %) nasal spray, Disp: , Rfl:   •  magnesium oxide (MAGOX) 400 (241.3 Mg) MG tablet tablet, Take 400 mg by mouth Daily., Disp: , Rfl:   •  memantine (NAMENDA) 10 MG tablet, Take 10 mg by mouth 2 (Two) Times a Day., Disp: , Rfl:   •  Omega-3 Fatty Acids (FISH OIL) 1000 MG capsule capsule, Take 1,000 mg by mouth Daily With Breakfast., Disp: , Rfl:   •  pantoprazole (PROTONIX) 40 MG EC tablet, Take 1 tablet by mouth Daily., Disp: 90 tablet, Rfl: 3  •   "pseudoephedrine (Sudafed 12 Hour) 120 MG 12 hr tablet, Take 1 tablet by mouth Every 12 (Twelve) Hours., Disp: 60 tablet, Rfl: 5  •  traZODone (DESYREL) 50 MG tablet, Take 2 tablets by mouth Every Night., Disp: 180 tablet, Rfl: 1  •  triamcinolone (KENALOG) 0.025 % cream, Apply  topically to the appropriate area as directed 2 (Two) Times a Day., Disp: 80 g, Rfl: 5  •  VIIBRYD 20 MG tablet tablet, Take 1 tablet by mouth Daily., Disp: , Rfl:   •  montelukast (Singulair) 10 MG tablet, One PO daily, Disp: 30 tablet, Rfl: 5  •  zoledronic acid (ZOMETA) 4 MG/100ML solution infusion (premix), Infuse 100 mL into a venous catheter 1 (One) Time for 1 dose., Disp: 100 mL, Rfl: 0      The following portions of the patient's history were reviewed and updated as appropriate: allergies, current medications, past family history, past medical history, past social history, past surgical history and problem list.    Review of Systems   Constitutional: Negative.  Negative for chills and fever.   HENT: Negative for ear discharge, ear pain, sinus pressure and sore throat.    Respiratory: Negative for cough, chest tightness and shortness of breath.    Cardiovascular: Negative for chest pain, palpitations and leg swelling.   Gastrointestinal: Negative for diarrhea, nausea and vomiting.   Musculoskeletal: Negative for arthralgias, back pain and myalgias.   Neurological: Positive for dizziness and light-headedness. Negative for syncope and headaches.   Psychiatric/Behavioral: Negative for confusion and sleep disturbance.       Objective   /70   Pulse 78   Temp 97.8 °F (36.6 °C)   Ht 165.1 cm (65\")   Wt 59.7 kg (131 lb 9.6 oz)   SpO2 99% Comment: ra  BMI 21.90 kg/m²   Physical Exam  Vitals and nursing note reviewed.   Constitutional:       Appearance: She is well-developed.   HENT:      Head: Normocephalic and atraumatic.      Right Ear: External ear normal.      Left Ear: External ear normal.      Mouth/Throat:      Pharynx: No " oropharyngeal exudate.   Eyes:      Conjunctiva/sclera: Conjunctivae normal.      Pupils: Pupils are equal, round, and reactive to light.   Neck:      Thyroid: No thyromegaly.   Cardiovascular:      Rate and Rhythm: Normal rate and regular rhythm.      Pulses: Normal pulses.      Heart sounds: Normal heart sounds. No murmur heard.   No friction rub. No gallop.    Pulmonary:      Effort: Pulmonary effort is normal.      Breath sounds: Normal breath sounds.   Abdominal:      General: Bowel sounds are normal. There is no distension.      Palpations: Abdomen is soft.      Tenderness: There is no abdominal tenderness.   Musculoskeletal:      Cervical back: Neck supple.   Skin:     General: Skin is warm and dry.   Neurological:      Mental Status: She is alert and oriented to person, place, and time.      Cranial Nerves: No cranial nerve deficit.   Psychiatric:         Judgment: Judgment normal.           Results for orders placed or performed in visit on 07/07/21   POCT urinalysis dipstick, automated    Specimen: Urine   Result Value Ref Range    Color Yellow Yellow, Straw, Dark Yellow, Maryanne    Clarity, UA Clear Clear    Specific Gravity  1.030 1.005 - 1.030    pH, Urine 5.5 5.0 - 8.0    Leukocytes Negative Negative    Nitrite, UA Negative Negative    Protein, POC Negative Negative mg/dL    Glucose, UA Negative Negative, 1000 mg/dL (3+) mg/dL    Ketones, UA Negative Negative    Urobilinogen, UA Normal Normal    Bilirubin Negative Negative    Blood, UA 1+ (A) Negative             Assessment/Plan   Diagnoses and all orders for this visit:    Dizziness  -     POCT urinalysis dipstick, automated  -     Comprehensive Metabolic Panel; Future  -     CBC (No Diff); Future  -     TSH; Future  -     Ferritin; Future  -     Iron Profile; Future    Hematuria, unspecified type  -     Urine Culture - Urine, Urine, Clean Catch; Future    Other fatigue  -     Comprehensive Metabolic Panel; Future  -     CBC (No Diff); Future  -      TSH; Future  -     Ferritin; Future  -     Iron Profile; Future    Iron deficiency  -     CBC (No Diff); Future  -     Ferritin; Future  -     Iron Profile; Future             Adv will call with results.    No follow-ups on file.    Electronically signed by:    Bronwyn Bird MD

## 2021-07-08 LAB
ALBUMIN SERPL-MCNC: 4.5 G/DL (ref 3.5–5.2)
ALBUMIN/GLOB SERPL: 2.4 G/DL
ALP SERPL-CCNC: 68 U/L (ref 39–117)
ALT SERPL-CCNC: 20 U/L (ref 1–33)
AST SERPL-CCNC: 22 U/L (ref 1–32)
BILIRUB SERPL-MCNC: 0.3 MG/DL (ref 0–1.2)
BUN SERPL-MCNC: 23 MG/DL (ref 8–23)
BUN/CREAT SERPL: 28.4 (ref 7–25)
CALCIUM SERPL-MCNC: 9.3 MG/DL (ref 8.6–10.5)
CHLORIDE SERPL-SCNC: 103 MMOL/L (ref 98–107)
CO2 SERPL-SCNC: 26.8 MMOL/L (ref 22–29)
CREAT SERPL-MCNC: 0.81 MG/DL (ref 0.57–1)
ERYTHROCYTE [DISTWIDTH] IN BLOOD BY AUTOMATED COUNT: 11.9 % (ref 12.3–15.4)
FERRITIN SERPL-MCNC: 118 NG/ML (ref 13–150)
GLOBULIN SER CALC-MCNC: 1.9 GM/DL
GLUCOSE SERPL-MCNC: 85 MG/DL (ref 65–99)
HCT VFR BLD AUTO: 39.4 % (ref 34–46.6)
HGB BLD-MCNC: 13 G/DL (ref 12–15.9)
IRON SATN MFR SERPL: 28 % (ref 20–50)
IRON SERPL-MCNC: 102 MCG/DL (ref 37–145)
MCH RBC QN AUTO: 32.8 PG (ref 26.6–33)
MCHC RBC AUTO-ENTMCNC: 33 G/DL (ref 31.5–35.7)
MCV RBC AUTO: 99.5 FL (ref 79–97)
PLATELET # BLD AUTO: 241 10*3/MM3 (ref 140–450)
POTASSIUM SERPL-SCNC: 4.7 MMOL/L (ref 3.5–5.2)
PROT SERPL-MCNC: 6.4 G/DL (ref 6–8.5)
RBC # BLD AUTO: 3.96 10*6/MM3 (ref 3.77–5.28)
SODIUM SERPL-SCNC: 139 MMOL/L (ref 136–145)
TIBC SERPL-MCNC: 363 MCG/DL
TSH SERPL DL<=0.005 MIU/L-ACNC: 1.83 UIU/ML (ref 0.27–4.2)
UIBC SERPL-MCNC: 261 MCG/DL (ref 112–346)
WBC # BLD AUTO: 6.79 10*3/MM3 (ref 3.4–10.8)

## 2021-07-10 LAB
APPEARANCE UR: ABNORMAL
BACTERIA #/AREA URNS HPF: NORMAL /[HPF]
BACTERIA UR CULT: ABNORMAL
BACTERIA UR CULT: ABNORMAL
BILIRUB UR QL STRIP: NEGATIVE
CASTS URNS QL MICRO: NORMAL /LPF
COLOR UR: YELLOW
EPI CELLS #/AREA URNS HPF: NORMAL /HPF (ref 0–10)
GLUCOSE UR QL: NEGATIVE
HGB UR QL STRIP: NEGATIVE
KETONES UR QL STRIP: ABNORMAL
LEUKOCYTE ESTERASE UR QL STRIP: NEGATIVE
MICRO URNS: ABNORMAL
MICRO URNS: ABNORMAL
NITRITE UR QL STRIP: NEGATIVE
OTHER ANTIBIOTIC SUSC ISLT: ABNORMAL
PH UR STRIP: 5.5 [PH] (ref 5–7.5)
PROT UR QL STRIP: NEGATIVE
RBC #/AREA URNS HPF: NORMAL /HPF (ref 0–2)
SP GR UR: 1.02 (ref 1–1.03)
UROBILINOGEN UR STRIP-MCNC: 0.2 MG/DL (ref 0.2–1)
WBC #/AREA URNS HPF: NORMAL /HPF (ref 0–5)

## 2021-07-12 RX ORDER — NITROFURANTOIN 25; 75 MG/1; MG/1
100 CAPSULE ORAL 2 TIMES DAILY
Qty: 14 CAPSULE | Refills: 0 | OUTPATIENT
Start: 2021-07-12 | End: 2021-08-04

## 2021-08-02 ENCOUNTER — PATIENT MESSAGE (OUTPATIENT)
Dept: INTERNAL MEDICINE | Facility: CLINIC | Age: 76
End: 2021-08-02

## 2021-08-02 DIAGNOSIS — R21 RASH: ICD-10-CM

## 2021-08-03 RX ORDER — MONTELUKAST SODIUM 10 MG/1
TABLET ORAL
Qty: 90 TABLET | Refills: 1 | Status: SHIPPED | OUTPATIENT
Start: 2021-08-03

## 2021-08-03 NOTE — TELEPHONE ENCOUNTER
aMriana Morgan MA 8/2/2021 1:14 PM EDT      ----- Message -----  From: Maura Barry  Sent: 8/2/2021 1:13 PM EDT  To: Gurjit Hirsch Pool  Subject: Non-Urgent Medical Question     Dr. MARTINEZ, 2 questions:  1.  Could you write me a Rx for Singulair?    2.   I've made an appt w/orthopedist because my ankle has only minimally improved. In the meantime, could you prescribe an anti inflammatory regimen for me to get started on? I'm happy to come to your office if I need to be seen before you prescribe.     Thanks, Maura Barry

## 2021-08-04 PROCEDURE — 87086 URINE CULTURE/COLONY COUNT: CPT | Performed by: FAMILY MEDICINE

## 2021-08-06 PROCEDURE — U0004 COV-19 TEST NON-CDC HGH THRU: HCPCS | Performed by: FAMILY MEDICINE

## 2021-08-07 ENCOUNTER — TELEPHONE (OUTPATIENT)
Dept: URGENT CARE | Facility: CLINIC | Age: 76
End: 2021-08-07

## 2021-08-07 NOTE — TELEPHONE ENCOUNTER
----- Message from Jairo Rosen MD sent at 8/7/2021  4:31 PM EDT -----  Please inform patient of negative lab result    ----- Message -----  From: Lab, Background User  Sent: 8/7/2021   4:28 PM EDT  To:  [facility] Covid Results

## 2021-08-23 ENCOUNTER — OFFICE VISIT (OUTPATIENT)
Dept: ORTHOPEDIC SURGERY | Facility: CLINIC | Age: 76
End: 2021-08-23

## 2021-08-23 VITALS
HEIGHT: 65 IN | BODY MASS INDEX: 21.34 KG/M2 | DIASTOLIC BLOOD PRESSURE: 70 MMHG | WEIGHT: 128.09 LBS | SYSTOLIC BLOOD PRESSURE: 115 MMHG

## 2021-08-23 DIAGNOSIS — M76.71 PERONEAL TENDINITIS OF LOWER LEG, RIGHT: ICD-10-CM

## 2021-08-23 DIAGNOSIS — M25.571 RIGHT ANKLE PAIN, UNSPECIFIED CHRONICITY: ICD-10-CM

## 2021-08-23 DIAGNOSIS — M79.671 RIGHT FOOT PAIN: Primary | ICD-10-CM

## 2021-08-23 PROCEDURE — 99204 OFFICE O/P NEW MOD 45 MIN: CPT | Performed by: ORTHOPAEDIC SURGERY

## 2021-08-23 NOTE — PROGRESS NOTES
NEW PATIENT    Patient: Maura Barry  : 1945    Primary Care Provider: Bronwyn Bird MD    Requesting Provider: As above    Pain (right foot and ankle pain)      History    Chief Complaint: Right ankle pain    History of Present Illness: This is an extremely pleasant 76-year-old woman who I have seen in the past, last visit .  She is here with a new problem.  In January of this year she began having lateral right ankle pain.  No history of injury.  She does not remember any swelling when it started.  She is an avid walker.  She had an x-ray of the foot done 2021, I reviewed the films and the report, it is nonweightbearing, my interpretation is its unremarkable.  She went to PT and got much better.  Then in March she began doing trell chi and the pain came back.  She noted a lot of stiffness, difficulty with balance.  She has more pain with stairs.  She went back to PT but is still having significant problems.  She now notices swelling.  The pain can be 4-8 out of 10, sometimes sharp and stabbing.  The worst pain is behind the fibula, but it radiates down along the lateral hindfoot.  She has had custom orthotics in the past.  No recent orthotics she has tried ice and elevation without any improvement.    Current Outpatient Medications on File Prior to Visit   Medication Sig Dispense Refill   • ALLERGY SERUM INJECTION Inject  under the skin 1 (One) Time Per Week.     • Ascorbic Acid (VITAMIN C) 500 MG capsule Take 1,000 mg by mouth Daily.     • buPROPion XL (WELLBUTRIN XL) 300 MG 24 hr tablet Take 1 tablet by mouth Daily.  2   • Calcium-Magnesium (CALCIUM MAGNESIUM 750) 300-300 MG tablet Take 2 tablets by mouth Daily.     • Cholecalciferol (VITAMIN D3) 5000 units capsule capsule Take 5,000 Units by mouth Daily.     • Cyanocobalamin 1000 MCG sublingual tablet Place 1 tablet under the tongue Daily. 30 each 5   • famotidine (PEPCID) 40 MG tablet TAKE 1 TABLET BY MOUTH DAILY 30 tablet 5   •  fluticasone (FLONASE) 50 MCG/ACT nasal spray 1 spray into the nostril(s) as directed by provider 2 (two) times a day. 16 g 5   • Glucosamine HCl 1000 MG tablet Take 2,000 mg by mouth Daily.     • ipratropium (ATROVENT) 0.03 % nasal spray ipratropium bromide 21 mcg (0.03 %) nasal spray     • magnesium oxide (MAGOX) 400 (241.3 Mg) MG tablet tablet Take 400 mg by mouth Daily.     • memantine (NAMENDA) 10 MG tablet Take 10 mg by mouth 2 (Two) Times a Day.     • montelukast (Singulair) 10 MG tablet One PO daily 90 tablet 1   • Omega-3 Fatty Acids (FISH OIL) 1000 MG capsule capsule Take 1,000 mg by mouth Daily With Breakfast.     • pantoprazole (PROTONIX) 40 MG EC tablet Take 1 tablet by mouth Daily. 90 tablet 3   • traZODone (DESYREL) 50 MG tablet Take 2 tablets by mouth Every Night. 180 tablet 1   • triamcinolone (KENALOG) 0.025 % cream Apply  topically to the appropriate area as directed 2 (Two) Times a Day. 80 g 5   • VIIBRYD 20 MG tablet tablet Take 1 tablet by mouth Daily.     • Diclofenac Sodium (VOLTAREN) 1 % gel gel Apply 4 g topically to the appropriate area as directed 3 (Three) Times a Day. 100 g 2   • nitrofurantoin, macrocrystal-monohydrate, (MACROBID) 100 MG capsule Take 1 capsule by mouth 2 (Two) Times a Day. 14 capsule 0   • pseudoephedrine (Sudafed 12 Hour) 120 MG 12 hr tablet Take 1 tablet by mouth Every 12 (Twelve) Hours. 60 tablet 5   • zoledronic acid (ZOMETA) 4 MG/100ML solution infusion (premix) Infuse 100 mL into a venous catheter 1 (One) Time for 1 dose. 100 mL 0     No current facility-administered medications on file prior to visit.      Allergies   Allergen Reactions   • Penicillins Hives      Past Medical History:   Diagnosis Date   • Allergic    • Arthritis    • Backache    • Depression    • Depression    • Dysuria    • Neck pain    • Pelvic pain    • Vaginitis      Past Surgical History:   Procedure Laterality Date   • ANAL FISSURECTOMY     • BLADDER SUSPENSION     • RESECTION OF NASAL  "TURBINATES     • WISDOM TOOTH EXTRACTION       Family History   Problem Relation Age of Onset   • Stroke Mother    • Arthritis Mother    • Mental illness Mother    • Osteoporosis Mother    • Alzheimer's disease Father    • Hyperlipidemia Father    • Asthma Brother    • Heart failure Brother    • Hypertension Brother    • Mental illness Brother    • Cancer Brother    • Heart attack Brother    • Liver disease Brother    • Breast cancer Neg Hx    • Ovarian cancer Neg Hx       Social History     Socioeconomic History   • Marital status:      Spouse name: Not on file   • Number of children: Not on file   • Years of education: Not on file   • Highest education level: Not on file   Tobacco Use   • Smoking status: Never Smoker   • Smokeless tobacco: Never Used   Substance and Sexual Activity   • Alcohol use: Yes     Comment: 1 or less a week   • Drug use: No   • Sexual activity: Defer        Review of Systems   Constitutional: Positive for appetite change and fatigue.   HENT: Positive for congestion, postnasal drip and sinus pressure.    Eyes: Negative.    Respiratory: Positive for cough.    Cardiovascular: Negative.    Gastrointestinal: Positive for constipation.   Endocrine: Negative.    Genitourinary: Positive for difficulty urinating, enuresis, frequency, urgency and vaginal pain.   Musculoskeletal: Positive for arthralgias, back pain and joint swelling.   Skin: Negative.    Allergic/Immunologic: Positive for environmental allergies and food allergies.   Neurological: Positive for light-headedness.   Hematological: Bruises/bleeds easily.   Psychiatric/Behavioral: Positive for sleep disturbance.       The following portions of the patient's history were reviewed and updated as appropriate: allergies, current medications, past family history, past medical history, past social history, past surgical history and problem list.    Physical Exam:   /70   Ht 165.1 cm (65\")   Wt 58.1 kg (128 lb 1.4 oz)   BMI " 21.31 kg/m²   GENERAL: Body habitus: normal weight for height    Lower extremity edema: Right: trace; Left: none    Varicose veins:  Right: none; Left: none    Gait: antalgic with the first few steps     Mental Status:  awake and alert; oriented to person, place, and time    Voice:  clear  SKIN:  Lower extremity: Normal    Hair Growth(lower extremity):  Right:normal; Left:  normal  NAILS: Toenails: normal  HEENT: Head: Normocephalic, atraumatic,  without obvious abnormality.  eye: normal external eye, no icterus  ears:normal external ears  PULM:  Repiratory effort normal  CV:  Dorsalis Pedis:  Right: 2+; Left:2+    Posterior Tibial: Right:2+; Left:2+    Capillary Refill:  Brisk  MSK:  Hand:mild arthritis, Heberden's nodes      Tibia:  Right:  non tender; Left:  non tender      Ankle:  Right: Very tender over the peroneal tendons with boggy swelling, no crepitus, tenderness begins about 4 cm above the tip of the fibula and extends to the cuboid, otherwise nontender, mild cavus arches bilaterally, slight varus of the heels bilaterally, no other tenderness, symmetric range of motion; Left:  non tender, ROM  normal and motor function  normal      Foot:  Right:  See ankle exam; Left:  non tender, ROM  normal and motor function  normal      NEURO: Heel Walking:  Right:  normal; Left:  normal    Toe Walking:  Right:  Able to do this with pain in the peroneal tendons; Left:  normal     [place] 5.07 monofilament test: normal    Lower extremity sensation: intact     Reflexes:  Biceps:  Right:  not tested; Left:  not tested           Quads:  Right:  not tested; Left:  not tested           Ankle:  Right:  not tested; Left:  not tested      Calf Atrophy:none    Motor Function: all 5/5         Medical Decision Making    Data Review:   ordered and reviewed x-rays today, reviewed radiology images and reviewed radiology results    Assessment and Plan/ Diagnosis/Treatment options:   1.Peroneal tendinitis of lower leg,  right  She has peroneal tendinitis, there is boggy swelling over the peroneal tendons, she has pain with resisted testing,.  We need to do an MRI to look at the extent of the tendon involvement.  I outlined the treatment plan for her.  6 weeks in a short leg walking cast, then 6 weeks in a boot doing therapy with a custom orthotic and then 6 more weeks of therapy out of the boot.  I will order the MRI as soon as possible because the swelling is significant.  I went ahead and gave her prescription for the orthotics, I want them to try a lateral heel wedge.  I will see her back following the MRI, I advised her to be prepared to go into a cast.  - MRI Ankle Right Without Contrast; Future            Radiology Ordered []  Radiology Reports Reviewed []      Radiology Images Reviewed []   Labs Reviewed []    Labs Ordered []   PCP Records Reviewed []    Provider Records Reviewed []    ER Records Reviewed []    Hospital Records Reviewed []    History Obtained From Family []    Phone conversation with Provider []    Records Requested []        Belkis Palomino MD

## 2021-08-24 ENCOUNTER — TELEPHONE (OUTPATIENT)
Dept: ORTHOPEDIC SURGERY | Facility: CLINIC | Age: 76
End: 2021-08-24

## 2021-08-25 ENCOUNTER — TELEPHONE (OUTPATIENT)
Dept: ORTHOPEDIC SURGERY | Facility: CLINIC | Age: 76
End: 2021-08-25

## 2021-08-25 NOTE — TELEPHONE ENCOUNTER
Caller: MARIAM CHOE    Relationship: SELF    Best call back number: [phone]    What is the best time to reach you: YES    What was the call regarding: PATIENT SAW DR RAMIREZ ON 8/23/2021. SHE HAS AN MRI SCHEDULED FOR 8/26/2021 AT [facility]. SHE IS UNDER THE IMPRESSION THAT SHE IS TO GET A CAST IMMEDIATELY FOLLOWING HER MRI. SHE HAS QUESTIONS REGARDING THE CASTING PROCESS.     Do you require a callback: YES

## 2021-08-25 NOTE — TELEPHONE ENCOUNTER
I spoke with the patient and advised that the cast will not get placed on at the follow up appointment. I suggested that she brings someone with her as a  just in case she does officially get the cast placed on. She understood.    Rosalia

## 2021-08-30 DIAGNOSIS — M76.71 PERONEAL TENDINITIS OF LOWER LEG, RIGHT: ICD-10-CM

## 2021-09-01 ENCOUNTER — TELEPHONE (OUTPATIENT)
Dept: INTERNAL MEDICINE | Facility: CLINIC | Age: 76
End: 2021-09-01

## 2021-09-01 NOTE — TELEPHONE ENCOUNTER
Venkat pt called and requested an update on a plan of care dating 6/17/21. I didn't see anything in the chart. They are going to re fax the plan of care     You can call them at [phone]    Fax number [phone]

## 2021-09-04 DIAGNOSIS — J30.1 SEASONAL ALLERGIC RHINITIS DUE TO POLLEN: ICD-10-CM

## 2021-09-07 RX ORDER — PSEUDOEPHEDRINE HYDROCHLORIDE 120 MG/1
TABLET, FILM COATED, EXTENDED RELEASE ORAL
Qty: 60 TABLET | Refills: 5 | Status: SHIPPED | OUTPATIENT
Start: 2021-09-07 | End: 2023-01-06

## 2021-09-15 ENCOUNTER — OFFICE VISIT (OUTPATIENT)
Dept: ORTHOPEDIC SURGERY | Facility: CLINIC | Age: 76
End: 2021-09-15

## 2021-09-15 VITALS
WEIGHT: 128.09 LBS | SYSTOLIC BLOOD PRESSURE: 126 MMHG | DIASTOLIC BLOOD PRESSURE: 51 MMHG | HEART RATE: 94 BPM | HEIGHT: 65 IN | BODY MASS INDEX: 21.34 KG/M2

## 2021-09-15 DIAGNOSIS — M76.71 PERONEAL TENDINITIS OF LOWER LEG, RIGHT: Primary | ICD-10-CM

## 2021-09-15 PROCEDURE — 99213 OFFICE O/P EST LOW 20 MIN: CPT | Performed by: ORTHOPAEDIC SURGERY

## 2021-09-15 NOTE — PROGRESS NOTES
ESTABLISHED PATIENT    Patient: Maura Barry  : 1945    Primary Care Provider: Bronwyn Bird MD    Requesting Provider: As above    Follow-up (3 week MRI f/u--Right foot pain )      History    Chief Complaint: Right ankle pain    History of Present Illness: She returns for follow-up of the MRI of her right ankle, I reviewed the films and the report it was done 2021 at [facility].  It does  show she has peroneal tendinitis.  My interpretation is there is a lot of fluid around the peroneal brevis, there is some central degeneration, there is also a little bit of abnormality in the Achilles but she is not symptomatic there    Current Outpatient Medications on File Prior to Visit   Medication Sig Dispense Refill   • ALLERGY SERUM INJECTION Inject  under the skin 1 (One) Time Per Week.     • Ascorbic Acid (VITAMIN C) 500 MG capsule Take 1,000 mg by mouth Daily.     • buPROPion XL (WELLBUTRIN XL) 300 MG 24 hr tablet Take 1 tablet by mouth Daily.  2   • Calcium-Magnesium (CALCIUM MAGNESIUM 750) 300-300 MG tablet Take 2 tablets by mouth Daily.     • Cholecalciferol (VITAMIN D3) 5000 units capsule capsule Take 5,000 Units by mouth Daily.     • Cyanocobalamin 1000 MCG sublingual tablet Place 1 tablet under the tongue Daily. 30 each 5   • Diclofenac Sodium (VOLTAREN) 1 % gel gel Apply 4 g topically to the appropriate area as directed 3 (Three) Times a Day. 100 g 2   • famotidine (PEPCID) 40 MG tablet TAKE 1 TABLET BY MOUTH DAILY 30 tablet 5   • fluticasone (FLONASE) 50 MCG/ACT nasal spray 1 spray into the nostril(s) as directed by provider 2 (two) times a day. 16 g 5   • Glucosamine HCl 1000 MG tablet Take 2,000 mg by mouth Daily.     • ipratropium (ATROVENT) 0.03 % nasal spray ipratropium bromide 21 mcg (0.03 %) nasal spray     • magnesium oxide (MAGOX) 400 (241.3 Mg) MG tablet tablet Take 400 mg by mouth Daily.     • memantine (NAMENDA) 10 MG tablet Take 10 mg by mouth 2 (Two) Times a Day.      • montelukast (Singulair) 10 MG tablet One PO daily 90 tablet 1   • nitrofurantoin, macrocrystal-monohydrate, (MACROBID) 100 MG capsule Take 1 capsule by mouth 2 (Two) Times a Day. 14 capsule 0   • Omega-3 Fatty Acids (FISH OIL) 1000 MG capsule capsule Take 1,000 mg by mouth Daily With Breakfast.     • pantoprazole (PROTONIX) 40 MG EC tablet Take 1 tablet by mouth Daily. 90 tablet 3   • traZODone (DESYREL) 50 MG tablet Take 2 tablets by mouth Every Night. 180 tablet 1   • triamcinolone (KENALOG) 0.025 % cream Apply  topically to the appropriate area as directed 2 (Two) Times a Day. 80 g 5   • VIIBRYD 20 MG tablet tablet Take 1 tablet by mouth Daily.     • Wal-phed 12 Hour 120 MG 12 hr tablet TAKE 1 TABLET BY MOUTH EVERY 12 HOURS 60 tablet 5   • zoledronic acid (ZOMETA) 4 MG/100ML solution infusion (premix) Infuse 100 mL into a venous catheter 1 (One) Time for 1 dose. 100 mL 0     No current facility-administered medications on file prior to visit.      Allergies   Allergen Reactions   • Penicillins Hives      Past Medical History:   Diagnosis Date   • Allergic    • Arthritis    • Backache    • Depression    • Depression    • Dysuria    • Neck pain    • Pelvic pain    • Vaginitis      Past Surgical History:   Procedure Laterality Date   • ANAL FISSURECTOMY     • BLADDER SUSPENSION     • RESECTION OF NASAL TURBINATES     • WISDOM TOOTH EXTRACTION       Family History   Problem Relation Age of Onset   • Stroke Mother    • Arthritis Mother    • Mental illness Mother    • Osteoporosis Mother    • Alzheimer's disease Father    • Hyperlipidemia Father    • Asthma Brother    • Heart failure Brother    • Hypertension Brother    • Mental illness Brother    • Cancer Brother    • Heart attack Brother    • Liver disease Brother    • Breast cancer Neg Hx    • Ovarian cancer Neg Hx       Social History     Socioeconomic History   • Marital status:      Spouse name: Not on file   • Number of children: Not on file   •  "Years of education: Not on file   • Highest education level: Not on file   Tobacco Use   • Smoking status: Never Smoker   • Smokeless tobacco: Never Used   Substance and Sexual Activity   • Alcohol use: Yes     Comment: 1 or less a week   • Drug use: No   • Sexual activity: Defer        Review of Systems   Constitutional: Negative.    HENT: Negative.    Eyes: Negative.    Respiratory: Negative.    Cardiovascular: Negative.    Gastrointestinal: Negative.    Endocrine: Negative.    Genitourinary: Negative.    Musculoskeletal: Positive for arthralgias.   Skin: Negative.    Allergic/Immunologic: Negative.    Neurological: Negative.    Hematological: Negative.    Psychiatric/Behavioral: Negative.        The following portions of the patient's history were reviewed and updated as appropriate: allergies, current medications, past family history, past medical history, past social history, past surgical history and problem list.    Physical Exam:   /51   Pulse 94   Ht 165.1 cm (65\")   Wt 58.1 kg (128 lb 1.4 oz)   BMI 21.31 kg/m²   GENERAL: Gait: antalgic with the first few steps       MSK:  Ankle:  Right: Tender over the peroneal tendons;     Medical Decision Making  I reviewed the MRI and the report  Data Review:   reviewed radiology images and reviewed radiology results    Assessment/Plan/Diagnosis/Treatment Options:   1. Peroneal tendinitis of lower leg, right  She was placed in a short leg fiberglass walking cast today.  I will see her in 6 weeks for exam probably go into a boot and start PT                            "

## 2021-09-16 ENCOUNTER — TELEPHONE (OUTPATIENT)
Dept: ORTHOPEDIC SURGERY | Facility: CLINIC | Age: 76
End: 2021-09-16

## 2021-09-16 NOTE — TELEPHONE ENCOUNTER
Pt called in to state that she is developing a blister on the inside of the top of the right ankle. Would like a call back with advise on what she should do please

## 2021-09-17 ENCOUNTER — TELEPHONE (OUTPATIENT)
Dept: INTERNAL MEDICINE | Facility: CLINIC | Age: 76
End: 2021-09-17

## 2021-09-17 NOTE — TELEPHONE ENCOUNTER
LVM LETTING PT KNOW THAT THEIR APPOINTMENT ON 9- HAS BEEN CANCELLED, PROVIDER JOHN, RESCHEDULED APPOINTMENT TO 12/16/2021 AT 11:00

## 2021-09-20 ENCOUNTER — TELEPHONE (OUTPATIENT)
Dept: ORTHOPEDIC SURGERY | Facility: CLINIC | Age: 76
End: 2021-09-20

## 2021-09-20 NOTE — TELEPHONE ENCOUNTER
Pt called in to state that the cast on her right foot is loose and moving, causing pain. Would like a call back please

## 2021-09-20 NOTE — TELEPHONE ENCOUNTER
Patient came in and I changed her cast out.  It was a little red on the medial side of ankle.  Taisha

## 2021-09-21 NOTE — TELEPHONE ENCOUNTER
I returned the patients call.  She said that she has been resting and her foot feels better.  I explained that it is going to take some time for it to quit hurting and to heal.  That is why she is to wear the cast for 6 weeks and then a boot for 6 weeks.  She understood.  Taisha   There are no Wet Read(s) to document.

## 2021-09-30 ENCOUNTER — TELEPHONE (OUTPATIENT)
Dept: INTERNAL MEDICINE | Facility: CLINIC | Age: 76
End: 2021-09-30

## 2021-09-30 DIAGNOSIS — N81.4 CYSTOCELE WITH PROLAPSE: Primary | ICD-10-CM

## 2021-09-30 NOTE — TELEPHONE ENCOUNTER
----- Message from Dorcas Coronel sent at 9/29/2021  3:47 PM EDT -----  Regarding: FW: Referral Request  Contact: [phone]    ----- Message -----  From: Maura Barry  Sent: 9/29/2021   2:40 PM EDT  To: Gurjit Thurston [facility]  Subject: Referral Request                                 Dr. LESLIE, I continue to have pelvic floor problems and a friend of mine who has similar problems has had good benefit from seeing a physical therapist named Dr. Vinod Ventura. I wonder if you would be willing to write a referral to him for me. I don't know how much information you need to put on the referral so here is his information: [address]; [place] [id number].   Thank you. Portia Barry

## 2021-10-19 ENCOUNTER — TELEPHONE (OUTPATIENT)
Dept: INTERNAL MEDICINE | Facility: CLINIC | Age: 76
End: 2021-10-19

## 2021-10-19 NOTE — TELEPHONE ENCOUNTER
Pt states needs a new referral to PT.  Pt states she is getting her cast off next week & will need a new PT order for that.  Pt requests Vinod Ventura.

## 2021-10-20 DIAGNOSIS — K21.9 GASTROESOPHAGEAL REFLUX DISEASE WITHOUT ESOPHAGITIS: ICD-10-CM

## 2021-10-21 RX ORDER — PANTOPRAZOLE SODIUM 40 MG/1
40 TABLET, DELAYED RELEASE ORAL DAILY
Qty: 90 TABLET | Refills: 3 | Status: SHIPPED | OUTPATIENT
Start: 2021-10-21 | End: 2022-12-21

## 2021-10-27 ENCOUNTER — OFFICE VISIT (OUTPATIENT)
Dept: ORTHOPEDIC SURGERY | Facility: CLINIC | Age: 76
End: 2021-10-27

## 2021-10-27 VITALS
BODY MASS INDEX: 21.33 KG/M2 | HEART RATE: 97 BPM | WEIGHT: 128 LBS | DIASTOLIC BLOOD PRESSURE: 66 MMHG | HEIGHT: 65 IN | SYSTOLIC BLOOD PRESSURE: 140 MMHG

## 2021-10-27 DIAGNOSIS — M76.71 PERONEAL TENDINITIS OF LOWER LEG, RIGHT: Primary | ICD-10-CM

## 2021-10-27 PROCEDURE — 99213 OFFICE O/P EST LOW 20 MIN: CPT | Performed by: ORTHOPAEDIC SURGERY

## 2021-10-27 NOTE — PROGRESS NOTES
ESTABLISHED PATIENT    Patient: Maura Barry  : 1945    Primary Care Provider: Bronwyn Bird MD    Requesting Provider: As above    Follow-up (6 week follow up - Peroneal tendinitis of lower leg, right )      History    Chief Complaint: peroneal tendintis    History of Present Illness: She returns for follow-up of 6 weeks of casting for right peroneal tendinitis    Current Outpatient Medications on File Prior to Visit   Medication Sig Dispense Refill   • ALLERGY SERUM INJECTION Inject  under the skin 1 (One) Time Per Week.     • Ascorbic Acid (VITAMIN C) 500 MG capsule Take 1,000 mg by mouth Daily.     • buPROPion XL (WELLBUTRIN XL) 300 MG 24 hr tablet Take 1 tablet by mouth Daily.  2   • Calcium-Magnesium (CALCIUM MAGNESIUM 750) 300-300 MG tablet Take 2 tablets by mouth Daily.     • Cholecalciferol (VITAMIN D3) 5000 units capsule capsule Take 5,000 Units by mouth Daily.     • Cyanocobalamin 1000 MCG sublingual tablet Place 1 tablet under the tongue Daily. 30 each 5   • Diclofenac Sodium (VOLTAREN) 1 % gel gel Apply 4 g topically to the appropriate area as directed 3 (Three) Times a Day. 100 g 2   • famotidine (PEPCID) 40 MG tablet TAKE 1 TABLET BY MOUTH DAILY 30 tablet 5   • fluticasone (FLONASE) 50 MCG/ACT nasal spray 1 spray into the nostril(s) as directed by provider 2 (two) times a day. 16 g 5   • Glucosamine HCl 1000 MG tablet Take 2,000 mg by mouth Daily.     • ipratropium (ATROVENT) 0.03 % nasal spray ipratropium bromide 21 mcg (0.03 %) nasal spray     • magnesium oxide (MAGOX) 400 (241.3 Mg) MG tablet tablet Take 400 mg by mouth Daily.     • memantine (NAMENDA) 10 MG tablet Take 10 mg by mouth 2 (Two) Times a Day.     • montelukast (Singulair) 10 MG tablet One PO daily 90 tablet 1   • nitrofurantoin, macrocrystal-monohydrate, (MACROBID) 100 MG capsule Take 1 capsule by mouth 2 (Two) Times a Day. 14 capsule 0   • Omega-3 Fatty Acids (FISH OIL) 1000 MG capsule capsule Take 1,000 mg by mouth  Daily With Breakfast.     • pantoprazole (PROTONIX) 40 MG EC tablet TAKE 1 TABLET BY MOUTH DAILY 90 tablet 3   • traZODone (DESYREL) 50 MG tablet Take 2 tablets by mouth Every Night. 180 tablet 1   • triamcinolone (KENALOG) 0.025 % cream Apply  topically to the appropriate area as directed 2 (Two) Times a Day. 80 g 5   • VIIBRYD 20 MG tablet tablet Take 1 tablet by mouth Daily.     • Wal-phed 12 Hour 120 MG 12 hr tablet TAKE 1 TABLET BY MOUTH EVERY 12 HOURS 60 tablet 5   • zoledronic acid (ZOMETA) 4 MG/100ML solution infusion (premix) Infuse 100 mL into a venous catheter 1 (One) Time for 1 dose. 100 mL 0     No current facility-administered medications on file prior to visit.      Allergies   Allergen Reactions   • Penicillins Hives      Past Medical History:   Diagnosis Date   • Allergic    • Arthritis    • Backache    • Depression    • Depression    • Dysuria    • Neck pain    • Pelvic pain    • Vaginitis      Past Surgical History:   Procedure Laterality Date   • ANAL FISSURECTOMY     • BLADDER SUSPENSION     • RESECTION OF NASAL TURBINATES     • WISDOM TOOTH EXTRACTION       Family History   Problem Relation Age of Onset   • Stroke Mother    • Arthritis Mother    • Mental illness Mother    • Osteoporosis Mother    • Alzheimer's disease Father    • Hyperlipidemia Father    • Asthma Brother    • Heart failure Brother    • Hypertension Brother    • Mental illness Brother    • Cancer Brother    • Heart attack Brother    • Liver disease Brother    • Breast cancer Neg Hx    • Ovarian cancer Neg Hx       Social History     Socioeconomic History   • Marital status:    Tobacco Use   • Smoking status: Never Smoker   • Smokeless tobacco: Never Used   Substance and Sexual Activity   • Alcohol use: Yes     Comment: 1 or less a week   • Drug use: No   • Sexual activity: Defer        Review of Systems   Constitutional: Negative.    HENT: Negative.    Eyes: Negative.    Respiratory: Negative.    Cardiovascular:  "Negative.    Gastrointestinal: Negative.    Endocrine: Negative.    Genitourinary: Negative.    Musculoskeletal: Positive for arthralgias.   Skin: Negative.    Allergic/Immunologic: Negative.    Neurological: Negative.    Hematological: Negative.    Psychiatric/Behavioral: Negative.        The following portions of the patient's history were reviewed and updated as appropriate: allergies, current medications, past family history, past medical history, past social history, past surgical history and problem list.    Physical Exam:   /66   Pulse 97   Ht 165.1 cm (65\")   Wt 58.1 kg (128 lb)   BMI 21.30 kg/m²   GENERAL: Body habitus: normal weight for height    Lower extremity edema: Left: none; Right: none    Gait: normal     Mental Status:  awake and alert; oriented to person, place, and time  MSK:          Ankle:  Right: Mildly tender over the peroneal tendon below the fibula no swelling;   Medical Decision Making    Data Review:   none    Assessment/Plan/Diagnosis/Treatment Options:   1. Peroneal tendinitis of lower leg, right  She is improved but not fully resolved, this is to be expected.  I explained her she is only one third of the way through treatment.  She now goes into a tall boot, she should wear her custom orthotic in the boot.  She should start physical therapy and do the first 6 weeks walking in the boot second 6 weeks of therapy out of the boot I will see her when that is concluded  - Ambulatory Referral to Physical Therapy        Belkis Palomino MD                      "

## 2021-11-23 ENCOUNTER — LAB (OUTPATIENT)
Dept: LAB | Facility: HOSPITAL | Age: 76
End: 2021-11-23

## 2021-11-23 DIAGNOSIS — E78.5 DYSLIPIDEMIA: ICD-10-CM

## 2021-11-23 DIAGNOSIS — R73.09 ELEVATED GLUCOSE: ICD-10-CM

## 2021-11-23 DIAGNOSIS — E55.9 VITAMIN D DEFICIENCY: ICD-10-CM

## 2021-11-23 DIAGNOSIS — E53.8 B12 DEFICIENCY: ICD-10-CM

## 2021-11-23 LAB
25(OH)D3+25(OH)D2 SERPL-MCNC: 48.1 NG/ML (ref 30–100)
ALBUMIN SERPL-MCNC: 4.4 G/DL (ref 3.5–5.2)
ALBUMIN/GLOB SERPL: 2.3 G/DL
ALP SERPL-CCNC: 73 U/L (ref 39–117)
ALT SERPL-CCNC: 16 U/L (ref 1–33)
AST SERPL-CCNC: 20 U/L (ref 1–32)
BILIRUB SERPL-MCNC: 0.3 MG/DL (ref 0–1.2)
BUN SERPL-MCNC: 20 MG/DL (ref 8–23)
BUN/CREAT SERPL: 23 (ref 7–25)
CALCIUM SERPL-MCNC: 9.2 MG/DL (ref 8.6–10.5)
CHLORIDE SERPL-SCNC: 102 MMOL/L (ref 98–107)
CHOLEST SERPL-MCNC: 222 MG/DL (ref 0–200)
CO2 SERPL-SCNC: 29.7 MMOL/L (ref 22–29)
CREAT SERPL-MCNC: 0.87 MG/DL (ref 0.57–1)
ERYTHROCYTE [DISTWIDTH] IN BLOOD BY AUTOMATED COUNT: 11.9 % (ref 12.3–15.4)
GLOBULIN SER CALC-MCNC: 1.9 GM/DL
GLUCOSE SERPL-MCNC: 85 MG/DL (ref 65–99)
HBA1C MFR BLD: 5.3 % (ref 4.8–5.6)
HCT VFR BLD AUTO: 40.5 % (ref 34–46.6)
HDLC SERPL-MCNC: 73 MG/DL (ref 40–60)
HGB BLD-MCNC: 13.2 G/DL (ref 12–15.9)
LDLC SERPL CALC-MCNC: 136 MG/DL (ref 0–100)
MCH RBC QN AUTO: 32.3 PG (ref 26.6–33)
MCHC RBC AUTO-ENTMCNC: 32.6 G/DL (ref 31.5–35.7)
MCV RBC AUTO: 99 FL (ref 79–97)
PLATELET # BLD AUTO: 283 10*3/MM3 (ref 140–450)
POTASSIUM SERPL-SCNC: 4.8 MMOL/L (ref 3.5–5.2)
PROT SERPL-MCNC: 6.3 G/DL (ref 6–8.5)
RBC # BLD AUTO: 4.09 10*6/MM3 (ref 3.77–5.28)
SODIUM SERPL-SCNC: 141 MMOL/L (ref 136–145)
TRIGL SERPL-MCNC: 72 MG/DL (ref 0–150)
TSH SERPL DL<=0.005 MIU/L-ACNC: 2.24 UIU/ML (ref 0.27–4.2)
VLDLC SERPL CALC-MCNC: 13 MG/DL (ref 5–40)
WBC # BLD AUTO: 6.68 10*3/MM3 (ref 3.4–10.8)

## 2021-12-06 ENCOUNTER — TELEPHONE (OUTPATIENT)
Dept: INTERNAL MEDICINE | Facility: CLINIC | Age: 76
End: 2021-12-06

## 2021-12-06 ENCOUNTER — PATIENT MESSAGE (OUTPATIENT)
Dept: INTERNAL MEDICINE | Facility: CLINIC | Age: 76
End: 2021-12-06

## 2021-12-06 DIAGNOSIS — M54.2 CERVICALGIA: ICD-10-CM

## 2021-12-06 DIAGNOSIS — G89.29 CHRONIC RIGHT-SIDED LOW BACK PAIN WITHOUT SCIATICA: Primary | ICD-10-CM

## 2021-12-06 DIAGNOSIS — M54.50 CHRONIC RIGHT-SIDED LOW BACK PAIN WITHOUT SCIATICA: Primary | ICD-10-CM

## 2021-12-07 NOTE — TELEPHONE ENCOUNTER
----- Message from Mariana Morgan MA sent at 12/6/2021  4:44 PM EST -----  Regarding: FW: PT referral    ----- Message -----  From: Maura Barry  Sent: 12/6/2021   4:41 PM EST  To: [facility]  Subject: PT referral                                      Will MARTINEZ,   I showed up for my appt with you  today on the wrong day. It is not until the 16th. Hopefully, you can help me sooner with this. My physical therapist, Vinod Ventura, would like another referral from you for my back problems. Wearing the cast, then the boot really messed with my already troublesome back. He is the best PT I have ever worked with. Thanks, Portia PARSONS

## 2021-12-07 NOTE — TELEPHONE ENCOUNTER
Mariana Morgan MA 12/6/2021 4:44 PM EST      ----- Message -----  From: Maura Barry  Sent: 12/6/2021 4:41 PM EST  To: [facility]  Subject: PT referral     Hi Dr. MARTINEZ,   I showed up for my appt with you today on the wrong day. It is not until the 16th. Hopefully, you can help me sooner with this. My physical therapist, Vinod Ventura, would like another referral from you for my back problems. Wearing the cast, then the boot really messed with my already troublesome back. He is the best PT I have ever worked with. Thanks, Portia PARSONS

## 2021-12-13 DIAGNOSIS — L85.3 XEROSIS CUTIS: ICD-10-CM

## 2021-12-13 RX ORDER — TRIAMCINOLONE ACETONIDE 0.25 MG/G
CREAM TOPICAL
Qty: 80 G | Refills: 5 | Status: SHIPPED | OUTPATIENT
Start: 2021-12-13 | End: 2022-08-31

## 2021-12-16 ENCOUNTER — LAB (OUTPATIENT)
Dept: LAB | Facility: HOSPITAL | Age: 76
End: 2021-12-16

## 2021-12-16 ENCOUNTER — OFFICE VISIT (OUTPATIENT)
Dept: INTERNAL MEDICINE | Facility: CLINIC | Age: 76
End: 2021-12-16

## 2021-12-16 VITALS
DIASTOLIC BLOOD PRESSURE: 68 MMHG | OXYGEN SATURATION: 98 % | WEIGHT: 131.6 LBS | HEART RATE: 91 BPM | BODY MASS INDEX: 21.92 KG/M2 | HEIGHT: 65 IN | SYSTOLIC BLOOD PRESSURE: 118 MMHG

## 2021-12-16 DIAGNOSIS — R21 RASH: ICD-10-CM

## 2021-12-16 DIAGNOSIS — J30.89 SEASONAL ALLERGIC RHINITIS DUE TO OTHER ALLERGIC TRIGGER: ICD-10-CM

## 2021-12-16 DIAGNOSIS — E61.1 IRON DEFICIENCY: ICD-10-CM

## 2021-12-16 DIAGNOSIS — R73.09 ELEVATED GLUCOSE: ICD-10-CM

## 2021-12-16 DIAGNOSIS — B07.8 OTHER VIRAL WARTS: ICD-10-CM

## 2021-12-16 DIAGNOSIS — M70.71 ILIOPSOAS BURSITIS OF RIGHT HIP: ICD-10-CM

## 2021-12-16 DIAGNOSIS — Z00.00 MEDICARE ANNUAL WELLNESS VISIT, SUBSEQUENT: Primary | ICD-10-CM

## 2021-12-16 DIAGNOSIS — E53.8 B12 DEFICIENCY: ICD-10-CM

## 2021-12-16 DIAGNOSIS — M54.50 CHRONIC RIGHT-SIDED LOW BACK PAIN WITHOUT SCIATICA: ICD-10-CM

## 2021-12-16 DIAGNOSIS — E55.9 VITAMIN D DEFICIENCY: ICD-10-CM

## 2021-12-16 DIAGNOSIS — G89.29 CHRONIC RIGHT-SIDED LOW BACK PAIN WITHOUT SCIATICA: ICD-10-CM

## 2021-12-16 DIAGNOSIS — K21.9 GASTROESOPHAGEAL REFLUX DISEASE WITHOUT ESOPHAGITIS: ICD-10-CM

## 2021-12-16 DIAGNOSIS — E78.5 DYSLIPIDEMIA: ICD-10-CM

## 2021-12-16 PROCEDURE — 96160 PT-FOCUSED HLTH RISK ASSMT: CPT | Performed by: INTERNAL MEDICINE

## 2021-12-16 PROCEDURE — 1125F AMNT PAIN NOTED PAIN PRSNT: CPT | Performed by: INTERNAL MEDICINE

## 2021-12-16 PROCEDURE — 1170F FXNL STATUS ASSESSED: CPT | Performed by: INTERNAL MEDICINE

## 2021-12-16 PROCEDURE — G0444 DEPRESSION SCREEN ANNUAL: HCPCS | Performed by: INTERNAL MEDICINE

## 2021-12-16 PROCEDURE — G0439 PPPS, SUBSEQ VISIT: HCPCS | Performed by: INTERNAL MEDICINE

## 2021-12-16 PROCEDURE — 1159F MED LIST DOCD IN RCRD: CPT | Performed by: INTERNAL MEDICINE

## 2021-12-16 RX ORDER — FLUTICASONE PROPIONATE 50 MCG
1 SPRAY, SUSPENSION (ML) NASAL DAILY
Qty: 16 G | Refills: 5 | Status: SHIPPED | OUTPATIENT
Start: 2021-12-16 | End: 2022-09-03

## 2021-12-16 RX ORDER — FAMOTIDINE 40 MG/1
40 TABLET, FILM COATED ORAL DAILY
Qty: 30 TABLET | Refills: 5 | Status: SHIPPED | OUTPATIENT
Start: 2021-12-16 | End: 2023-01-06 | Stop reason: SDUPTHER

## 2021-12-16 NOTE — PROGRESS NOTES
The ABCs of the Annual Wellness Visit  Subsequent Medicare Wellness Visit    Chief Complaint   Patient presents with   • Medicare Wellness-subsequent   • finger problem   • Pain     ankle, back and pelvic floor   • lab results      Subjective    History of Present Illness:  Maura Barry is a 76 y.o. female who presents for a Subsequent Medicare Wellness Visit.    The following portions of the patient's history were reviewed and   updated as appropriate: allergies, current medications, past family history, past medical history, past social history, past surgical history and problem list.    Compared to one year ago, the patient feels her physical   health is worse.    Compared to one year ago, the patient feels her mental   health is better.    Recent Hospitalizations:  She was not admitted to the hospital during the last year.       Current Medical Providers:  Patient Care Team:  Bronwyn Bird MD as PCP - General (Internal Medicine)  Grace Bell MD as Consulting Physician (Dermatology)    Outpatient Medications Prior to Visit   Medication Sig Dispense Refill   • ALLERGY SERUM INJECTION Inject  under the skin 1 (One) Time Per Week.     • Ascorbic Acid (VITAMIN C) 500 MG capsule Take 1,000 mg by mouth Daily.     • buPROPion XL (WELLBUTRIN XL) 300 MG 24 hr tablet Take 1 tablet by mouth Daily.  2   • Calcium-Magnesium (CALCIUM MAGNESIUM 750) 300-300 MG tablet Take 2 tablets by mouth Daily.     • Cholecalciferol (VITAMIN D3) 5000 units capsule capsule Take 5,000 Units by mouth Daily.     • Cyanocobalamin 1000 MCG sublingual tablet Place 1 tablet under the tongue Daily. 30 each 5   • Glucosamine HCl 1000 MG tablet Take 2,000 mg by mouth Daily.     • ipratropium (ATROVENT) 0.03 % nasal spray ipratropium bromide 21 mcg (0.03 %) nasal spray     • magnesium oxide (MAGOX) 400 (241.3 Mg) MG tablet tablet Take 400 mg by mouth Daily.     • memantine (NAMENDA) 10 MG tablet Take 10 mg by mouth 2 (Two) Times a Day.      • montelukast (Singulair) 10 MG tablet One PO daily 90 tablet 1   • Omega-3 Fatty Acids (FISH OIL) 1000 MG capsule capsule Take 1,000 mg by mouth Daily With Breakfast.     • pantoprazole (PROTONIX) 40 MG EC tablet TAKE 1 TABLET BY MOUTH DAILY 90 tablet 3   • traZODone (DESYREL) 50 MG tablet Take 2 tablets by mouth Every Night. 180 tablet 1   • triamcinolone (KENALOG) 0.025 % cream APPLY TOPICALLY TO THE AFFECTED AREA AS DIRECTED TWICE DAILY 80 g 5   • VIIBRYD 20 MG tablet tablet Take 1 tablet by mouth Daily.     • Wal-phed 12 Hour 120 MG 12 hr tablet TAKE 1 TABLET BY MOUTH EVERY 12 HOURS 60 tablet 5   • Diclofenac Sodium (VOLTAREN) 1 % gel gel Apply 4 g topically to the appropriate area as directed 3 (Three) Times a Day. 100 g 2   • famotidine (PEPCID) 40 MG tablet TAKE 1 TABLET BY MOUTH DAILY 30 tablet 5   • fluticasone (FLONASE) 50 MCG/ACT nasal spray 1 spray into the nostril(s) as directed by provider 2 (two) times a day. 16 g 5   • zoledronic acid (ZOMETA) 4 MG/100ML solution infusion (premix) Infuse 100 mL into a venous catheter 1 (One) Time for 1 dose. 100 mL 0   • nitrofurantoin, macrocrystal-monohydrate, (MACROBID) 100 MG capsule Take 1 capsule by mouth 2 (Two) Times a Day. 14 capsule 0     No facility-administered medications prior to visit.       No opioid medication identified on active medication list. I have reviewed chart for other potential  high risk medication/s and harmful drug interactions in the elderly.          Aspirin is not on active medication list.  Aspirin use is not indicated based on review of current medical condition/s. Risk of harm outweighs potential benefits.  .    Patient Active Problem List   Diagnosis   • KRISTY (obstructive sleep apnea)   • B12 deficiency   • Iron deficiency   • Dyslipidemia   • Other fatigue   • Menopausal osteoporosis     Advance Care Planning  Advance Directive is on file.  ACP discussion was held with the patient during this visit. Patient has an advance  "directive in EMR which is still valid.     Review of Systems   Constitutional: Negative for chills and fever.   HENT: Negative for congestion, ear pain and sore throat.    Eyes: Negative for pain, redness and visual disturbance.   Respiratory: Negative for cough and shortness of breath.    Cardiovascular: Negative for chest pain, palpitations and leg swelling.   Gastrointestinal: Negative for abdominal pain, diarrhea and nausea.   Endocrine: Negative for cold intolerance and heat intolerance.   Genitourinary: Positive for pelvic pain. Negative for flank pain and urgency.   Musculoskeletal: Positive for arthralgias and myalgias (foot pain). Negative for gait problem.   Skin: Negative for pallor and rash.   Neurological: Negative for dizziness and weakness.   Psychiatric/Behavioral: Negative for dysphoric mood and sleep disturbance. The patient is not nervous/anxious.         Objective    Vitals:    12/16/21 1133   BP: 118/68   Pulse: 91   SpO2: 98%  Comment: ra   Weight: 59.7 kg (131 lb 9.6 oz)   Height: 165.1 cm (65\")   PainSc:   7   PainLoc: Generalized     BMI Readings from Last 1 Encounters:   12/16/21 21.90 kg/m²   BMI is within normal parameters. No follow-up required.    Does the patient have evidence of cognitive impairment? No    Physical Exam  Constitutional:       General: She is not in acute distress.     Appearance: Normal appearance.   HENT:      Head: Normocephalic and atraumatic.      Right Ear: Tympanic membrane and external ear normal.      Left Ear: Tympanic membrane and external ear normal.      Nose: Nose normal.      Mouth/Throat:      Mouth: Mucous membranes are moist.   Eyes:      General: No scleral icterus.  Neck:      Vascular: No carotid bruit.   Cardiovascular:      Rate and Rhythm: Normal rate and regular rhythm.      Pulses: Normal pulses.      Heart sounds: Normal heart sounds. No murmur heard.  No friction rub. No gallop.    Pulmonary:      Effort: Pulmonary effort is normal.      " Breath sounds: Normal breath sounds. No rhonchi or rales.   Abdominal:      General: Bowel sounds are normal. There is no distension.      Palpations: Abdomen is soft.      Tenderness: There is no right CVA tenderness, left CVA tenderness, guarding or rebound.      Hernia: No hernia is present.   Musculoskeletal:         General: No tenderness. Normal range of motion.      Cervical back: Normal range of motion.      Right lower leg: No edema.      Left lower leg: No edema.   Lymphadenopathy:      Cervical: No cervical adenopathy.   Skin:     General: Skin is warm.      Findings: Lesion (wart rt index finger) present. No rash.   Neurological:      General: No focal deficit present.      Mental Status: She is alert and oriented to person, place, and time. Mental status is at baseline.      Cranial Nerves: No cranial nerve deficit.      Sensory: No sensory deficit.      Coordination: Coordination normal.      Gait: Gait normal.      Deep Tendon Reflexes: Reflexes normal.   Psychiatric:         Mood and Affect: Mood normal.         Behavior: Behavior normal.       Lab Results   Component Value Date    CHLPL 222 (H) 11/23/2021    TRIG 72 11/23/2021    HDL 73 (H) 11/23/2021     (H) 11/23/2021    VLDL 13 11/23/2021    HGBA1C 5.30 11/23/2021            HEALTH RISK ASSESSMENT    Smoking Status:  Social History     Tobacco Use   Smoking Status Never Smoker   Smokeless Tobacco Never Used     Alcohol Consumption:  Social History     Substance and Sexual Activity   Alcohol Use Yes    Comment: 1 or less a week     Fall Risk Screen:    IRMA Fall Risk Assessment was completed, and patient is at MODERATE risk for falls. Assessment completed on:12/16/2021    Depression Screening:  PHQ-2/PHQ-9 Depression Screening 12/16/2021   Little interest or pleasure in doing things 0   Feeling down, depressed, or hopeless 0   Total Score 0       Health Habits and Functional and Cognitive Screening:  Functional & Cognitive Status  12/16/2021   Do you have difficulty preparing food and eating? No   Do you have difficulty bathing yourself, getting dressed or grooming yourself? No   Do you have difficulty using the toilet? No   Do you have difficulty moving around from place to place? No   Do you have trouble with steps or getting out of a bed or a chair? No   Current Diet Well Balanced Diet   Dental Exam Up to date   Eye Exam Up to date   Exercise (times per week) 2 times per week   Current Exercises Include No Regular Exercise        Exercise Comment PT   Current Exercise Activities Include -   Do you need help using the phone?  No   Are you deaf or do you have serious difficulty hearing?  No   Do you need help with transportation? No   Do you need help shopping? No   Do you need help preparing meals?  No   Do you need help with housework?  No   Do you need help with laundry? No   Do you need help taking your medications? No   Do you need help managing money? No   Do you ever drive or ride in a car without wearing a seat belt? No   Have you felt unusual stress, anger or loneliness in the last month? No   Who do you live with? Alone   If you need help, do you have trouble finding someone available to you? No   Have you been bothered in the last four weeks by sexual problems? No   Do you have difficulty concentrating, remembering or making decisions? No       Age-appropriate Screening Schedule:  Refer to the list below for future screening recommendations based on patient's age, sex and/or medical conditions. Orders for these recommended tests are listed in the plan section. The patient has been provided with a written plan.    Health Maintenance   Topic Date Due   • DXA SCAN  02/01/2023   • MAMMOGRAM  06/22/2023   • TDAP/TD VACCINES (4 - Td or Tdap) 07/11/2026   • INFLUENZA VACCINE  Completed   • ZOSTER VACCINE  Completed              Assessment/Plan   CMS Preventative Services Quick Reference  Risk Factors Identified During Encounter  Fall  Risk-High or Moderate  Immunizations Discussed/Encouraged (specific Immunizations; utd on all vaccines  The above risks/problems have been discussed with the patient.  Follow up actions/plans if indicated are seen below in the Assessment/Plan Section.  Pertinent information has been shared with the patient in the After Visit Summary.    Medicare Wellness-subsequent, finger problem, Pain (ankle, back and pelvic floor), and lab results    Subjective   Maura Barry is a 76 y.o. female is here today for follow-up.  ALISSA Raya is here for a follow up on her allergies and is getting better with sleep.  Wearing a boot for her ankle .  Followed by Maryanne PAREDES for urology, really likes her.    Current Outpatient Medications:   •  ALLERGY SERUM INJECTION, Inject  under the skin 1 (One) Time Per Week., Disp: , Rfl:   •  Ascorbic Acid (VITAMIN C) 500 MG capsule, Take 1,000 mg by mouth Daily., Disp: , Rfl:   •  buPROPion XL (WELLBUTRIN XL) 300 MG 24 hr tablet, Take 1 tablet by mouth Daily., Disp: , Rfl: 2  •  Calcium-Magnesium (CALCIUM MAGNESIUM 750) 300-300 MG tablet, Take 2 tablets by mouth Daily., Disp: , Rfl:   •  Cholecalciferol (VITAMIN D3) 5000 units capsule capsule, Take 5,000 Units by mouth Daily., Disp: , Rfl:   •  Cyanocobalamin 1000 MCG sublingual tablet, Place 1 tablet under the tongue Daily., Disp: 30 each, Rfl: 5  •  Diclofenac Sodium (VOLTAREN) 1 % gel gel, Apply 4 g topically to the appropriate area as directed 3 (Three) Times a Day., Disp: 100 g, Rfl: 2  •  famotidine (PEPCID) 40 MG tablet, Take 1 tablet by mouth Daily., Disp: 30 tablet, Rfl: 5  •  fluticasone (FLONASE) 50 MCG/ACT nasal spray, 1 spray into the nostril(s) as directed by provider Daily., Disp: 16 g, Rfl: 5  •  Glucosamine HCl 1000 MG tablet, Take 2,000 mg by mouth Daily., Disp: , Rfl:   •  ipratropium (ATROVENT) 0.03 % nasal spray, ipratropium bromide 21 mcg (0.03 %) nasal spray, Disp: , Rfl:   •  magnesium oxide (MAGOX) 400 (241.3 Mg)  "MG tablet tablet, Take 400 mg by mouth Daily., Disp: , Rfl:   •  memantine (NAMENDA) 10 MG tablet, Take 10 mg by mouth 2 (Two) Times a Day., Disp: , Rfl:   •  montelukast (Singulair) 10 MG tablet, One PO daily, Disp: 90 tablet, Rfl: 1  •  Omega-3 Fatty Acids (FISH OIL) 1000 MG capsule capsule, Take 1,000 mg by mouth Daily With Breakfast., Disp: , Rfl:   •  pantoprazole (PROTONIX) 40 MG EC tablet, TAKE 1 TABLET BY MOUTH DAILY, Disp: 90 tablet, Rfl: 3  •  traZODone (DESYREL) 50 MG tablet, Take 2 tablets by mouth Every Night., Disp: 180 tablet, Rfl: 1  •  triamcinolone (KENALOG) 0.025 % cream, APPLY TOPICALLY TO THE AFFECTED AREA AS DIRECTED TWICE DAILY, Disp: 80 g, Rfl: 5  •  VIIBRYD 20 MG tablet tablet, Take 1 tablet by mouth Daily., Disp: , Rfl:   •  Wal-phed 12 Hour 120 MG 12 hr tablet, TAKE 1 TABLET BY MOUTH EVERY 12 HOURS, Disp: 60 tablet, Rfl: 5  •  zoledronic acid (ZOMETA) 4 MG/100ML solution infusion (premix), Infuse 100 mL into a venous catheter 1 (One) Time for 1 dose., Disp: 100 mL, Rfl: 0      The following portions of the patient's history were reviewed and updated as appropriate: allergies, current medications, past family history, past medical history, past social history, past surgical history and problem list.        Objective   /68   Pulse 91   Ht 165.1 cm (65\")   Wt 59.7 kg (131 lb 9.6 oz)   SpO2 98% Comment: ra  BMI 21.90 kg/m²         Results for orders placed or performed in visit on 11/23/21   Hemoglobin A1c    Specimen: Blood    Blood  Release to zay   Result Value Ref Range    Hemoglobin A1C 5.30 4.80 - 5.60 %   Vitamin D 25 Hydroxy    Specimen: Blood    Blood  Release to zay   Result Value Ref Range    25 Hydroxy, Vitamin D 48.1 30.0 - 100.0 ng/ml   TSH    Specimen: Blood    Blood  Release to zay   Result Value Ref Range    TSH 2.240 0.270 - 4.200 uIU/mL   Lipid Panel    Specimen: Blood    Blood  Release to zay   Result Value Ref Range    Total Cholesterol 222 (H) 0 - 200 mg/dL "    Triglycerides 72 0 - 150 mg/dL    HDL Cholesterol 73 (H) 40 - 60 mg/dL    VLDL Cholesterol Mann 13 5 - 40 mg/dL    LDL Chol Calc (NIH) 136 (H) 0 - 100 mg/dL   Comprehensive Metabolic Panel    Specimen: Blood    Blood  Release to [facility]   Result Value Ref Range    Glucose 85 65 - 99 mg/dL    BUN 20 8 - 23 mg/dL    Creatinine 0.87 0.57 - 1.00 mg/dL    eGFR Non African Am 64 >60 mL/min/1.73    eGFR African Am 77 >60 mL/min/1.73    BUN/Creatinine Ratio 23.0 7.0 - 25.0    Sodium 141 136 - 145 mmol/L    Potassium 4.8 3.5 - 5.2 mmol/L    Chloride 102 98 - 107 mmol/L    Total CO2 29.7 (H) 22.0 - 29.0 mmol/L    Calcium 9.2 8.6 - 10.5 mg/dL    Total Protein 6.3 6.0 - 8.5 g/dL    Albumin 4.40 3.50 - 5.20 g/dL    Globulin 1.9 gm/dL    A/G Ratio 2.3 g/dL    Total Bilirubin 0.3 0.0 - 1.2 mg/dL    Alkaline Phosphatase 73 39 - 117 U/L    AST (SGOT) 20 1 - 32 U/L    ALT (SGPT) 16 1 - 33 U/L   CBC (No Diff)    Specimen: Blood    Blood  Release to [facility]   Result Value Ref Range    WBC 6.68 3.40 - 10.80 10*3/mm3    RBC 4.09 3.77 - 5.28 10*6/mm3    Hemoglobin 13.2 12.0 - 15.9 g/dL    Hematocrit 40.5 34.0 - 46.6 %    MCV 99.0 (H) 79.0 - 97.0 fL    MCH 32.3 26.6 - 33.0 pg    MCHC 32.6 31.5 - 35.7 g/dL    RDW 11.9 (L) 12.3 - 15.4 %    Platelets 283 140 - 450 10*3/mm3       Cryotherapy, Skin Lesion    Date/Time: 12/16/2021 12:19 PM  Performed by: Bronwyn Bird MD  Authorized by: Bronwyn Bird MD   Risks and benefits: risks, benefits and alternatives were discussed  Consent given by: patient  Local anesthesia used: no    Anesthesia:  Local anesthesia used: no  Comments: Cryo of the wart on her tip of rt index finger            Assessment/Plan   Diagnoses and all orders for this visit:    Medicare annual wellness visit, subsequent    Dyslipidemia  -     Comprehensive Metabolic Panel; Future  -     Lipid Panel; Future  -     TSH; Future    B12 deficiency  -     CBC (No Diff); Future  -     Vitamin B12; Future    Vitamin D deficiency  -      Vitamin D 25 Hydroxy; Future    Elevated glucose  -     Hemoglobin A1c; Future    Iliopsoas bursitis of right hip  -     Diclofenac Sodium (VOLTAREN) 1 % gel gel; Apply 4 g topically to the appropriate area as directed 3 (Three) Times a Day.    Gastroesophageal reflux disease without esophagitis  -     famotidine (PEPCID) 40 MG tablet; Take 1 tablet by mouth Daily.    Rash  -     famotidine (PEPCID) 40 MG tablet; Take 1 tablet by mouth Daily.    Chronic right-sided low back pain without sciatica  -     fluticasone (FLONASE) 50 MCG/ACT nasal spray; 1 spray into the nostril(s) as directed by provider Daily.    Seasonal allergic rhinitis due to other allergic trigger  -     fluticasone (FLONASE) 50 MCG/ACT nasal spray; 1 spray into the nostril(s) as directed by provider Daily.    Iron deficiency  -     Ferritin; Future  -     Iron Profile; Future    Other orders  -     Cryotherapy, Skin Lesion               PHQ-2/PHQ-9 Depression screening reviewed with patient . Total time spent today for depression screening  with Maura Barry  was 15 minutes in counseling, along with plans for any diagnositc work-up and treatment.       Follow Up:   Return in about 1 year (around 12/16/2022) for Medicare Wellness.     An After Visit Summary and PPPS were made available to the patient.                     Electronically signed by:    Bronwyn Bird MD

## 2021-12-17 LAB
FERRITIN SERPL-MCNC: 119 NG/ML (ref 13–150)
IRON SATN MFR SERPL: 25 % (ref 20–50)
IRON SERPL-MCNC: 101 MCG/DL (ref 37–145)
TIBC SERPL-MCNC: 396 MCG/DL
UIBC SERPL-MCNC: 295 MCG/DL (ref 112–346)

## 2022-01-31 ENCOUNTER — OFFICE VISIT (OUTPATIENT)
Dept: ORTHOPEDIC SURGERY | Facility: CLINIC | Age: 77
End: 2022-01-31

## 2022-01-31 VITALS
BODY MASS INDEX: 21.89 KG/M2 | WEIGHT: 131.4 LBS | DIASTOLIC BLOOD PRESSURE: 82 MMHG | HEIGHT: 65 IN | SYSTOLIC BLOOD PRESSURE: 122 MMHG

## 2022-01-31 DIAGNOSIS — M76.71 PERONEAL TENDINITIS OF LOWER LEG, RIGHT: Primary | ICD-10-CM

## 2022-01-31 PROCEDURE — 99213 OFFICE O/P EST LOW 20 MIN: CPT | Performed by: ORTHOPAEDIC SURGERY

## 2022-01-31 RX ORDER — TRAZODONE HYDROCHLORIDE 100 MG/1
100 TABLET ORAL
COMMUNITY
Start: 2022-01-05

## 2022-01-31 NOTE — PROGRESS NOTES
ESTABLISHED PATIENT    Patient: Maura Barry  : 1945    Primary Care Provider: Bronwyn Bird MD    Requesting Provider: As above    Follow-up (3 month f/u  Peroneal tendinitis of lower leg, right)      History    Chief Complaint: Right ankle peroneal tendinitis    History of Present Illness: She returns for follow-up of her right peroneal tendinitis.  She reports she is 95% improved.  She has her orthotics, she did her therapy.  She is also working on her neck and her back.  She only occasionally has a twinge of pain, 3 out of 10, but overall she feels that she is 95% functional.    Current Outpatient Medications on File Prior to Visit   Medication Sig Dispense Refill   • ALLERGY SERUM INJECTION Inject  under the skin 1 (One) Time Per Week.     • Ascorbic Acid (VITAMIN C) 500 MG capsule Take 1,000 mg by mouth Daily.     • buPROPion XL (WELLBUTRIN XL) 300 MG 24 hr tablet Take 1 tablet by mouth Daily.  2   • Calcium-Magnesium (CALCIUM MAGNESIUM 750) 300-300 MG tablet Take 2 tablets by mouth Daily.     • Cholecalciferol (VITAMIN D3) 5000 units capsule capsule Take 5,000 Units by mouth Daily.     • Cyanocobalamin 1000 MCG sublingual tablet Place 1 tablet under the tongue Daily. 30 each 5   • Diclofenac Sodium (VOLTAREN) 1 % gel gel Apply 4 g topically to the appropriate area as directed 3 (Three) Times a Day. 100 g 2   • famotidine (PEPCID) 40 MG tablet Take 1 tablet by mouth Daily. 30 tablet 5   • fluticasone (FLONASE) 50 MCG/ACT nasal spray 1 spray into the nostril(s) as directed by provider Daily. 16 g 5   • Glucosamine HCl 1000 MG tablet Take 2,000 mg by mouth Daily.     • ipratropium (ATROVENT) 0.03 % nasal spray ipratropium bromide 21 mcg (0.03 %) nasal spray     • magnesium oxide (MAGOX) 400 (241.3 Mg) MG tablet tablet Take 400 mg by mouth Daily.     • memantine (NAMENDA) 10 MG tablet Take 10 mg by mouth 2 (Two) Times a Day.     • montelukast (Singulair) 10 MG tablet One PO daily 90 tablet 1   •  Omega-3 Fatty Acids (FISH OIL) 1000 MG capsule capsule Take 1,000 mg by mouth Daily With Breakfast.     • pantoprazole (PROTONIX) 40 MG EC tablet TAKE 1 TABLET BY MOUTH DAILY 90 tablet 3   • traZODone (DESYREL) 100 MG tablet Take 100 mg by mouth every night at bedtime.     • triamcinolone (KENALOG) 0.025 % cream APPLY TOPICALLY TO THE AFFECTED AREA AS DIRECTED TWICE DAILY 80 g 5   • VIIBRYD 20 MG tablet tablet Take 1 tablet by mouth Daily.     • Wal-phed 12 Hour 120 MG 12 hr tablet TAKE 1 TABLET BY MOUTH EVERY 12 HOURS 60 tablet 5   • [DISCONTINUED] traZODone (DESYREL) 50 MG tablet Take 2 tablets by mouth Every Night. 180 tablet 1   • zoledronic acid (ZOMETA) 4 MG/100ML solution infusion (premix) Infuse 100 mL into a venous catheter 1 (One) Time for 1 dose. 100 mL 0     No current facility-administered medications on file prior to visit.      Allergies   Allergen Reactions   • Penicillins Hives   • Methocarbamol Itching      Past Medical History:   Diagnosis Date   • Allergic    • Arthritis    • Backache    • Depression    • Depression    • Dysuria    • Neck pain    • Pelvic pain    • Vaginitis      Past Surgical History:   Procedure Laterality Date   • ANAL FISSURECTOMY     • BLADDER SUSPENSION     • RESECTION OF NASAL TURBINATES     • WISDOM TOOTH EXTRACTION       Family History   Problem Relation Age of Onset   • Stroke Mother    • Arthritis Mother    • Mental illness Mother    • Osteoporosis Mother    • Alzheimer's disease Father    • Hyperlipidemia Father    • Asthma Brother    • Heart failure Brother    • Hypertension Brother    • Mental illness Brother    • Cancer Brother    • Heart attack Brother    • Liver disease Brother    • Breast cancer Neg Hx    • Ovarian cancer Neg Hx       Social History     Socioeconomic History   • Marital status:    Tobacco Use   • Smoking status: Never Smoker   • Smokeless tobacco: Never Used   Substance and Sexual Activity   • Alcohol use: Yes     Comment: 1 or less a  "week   • Drug use: No   • Sexual activity: Defer        Review of Systems   Constitutional: Positive for activity change.   HENT: Positive for congestion.    Eyes: Negative.    Respiratory: Negative.    Cardiovascular: Negative.    Gastrointestinal: Negative.    Endocrine: Negative.    Genitourinary: Positive for frequency and urgency.   Musculoskeletal: Positive for arthralgias, back pain, neck pain and neck stiffness.   Skin: Negative.    Allergic/Immunologic: Positive for environmental allergies and food allergies.   Neurological: Negative.    Hematological: Negative.    Psychiatric/Behavioral: Negative.        The following portions of the patient's history were reviewed and updated as appropriate: allergies, current medications, past family history, past medical history, past social history, past surgical history and problem list.    Physical Exam:   /82   Ht 165.1 cm (65\")   Wt 59.6 kg (131 lb 6.4 oz)   BMI 21.87 kg/m²   GENERAL: Body habitus: normal weight for height    Lower extremity edema: Left: none; Right: none    Gait: normal     Mental Status:  awake and alert; oriented to person, place, and time  MSK:  T[place]ia:  Right:  non tender;        Ankle:  Right: non tender, ROM  normal and motor function  normal;          Foot:  Right:  non tender;     NEURO Sensation:  intact    Medical Decision Making    Data Review:   none    Assessment/Plan/Diagnosis/Treatment Options:   1. Peroneal tendinitis of lower leg, right  She is much improved.  I explained that surgery cannot make her better than this.  She asked me if it would ever be 100% I explained I do not know.  But she is much more functional.  She is increasing her activity.  I will be happy to see her anytime, if anything gets worse        Belkis Palomino MD                      "

## 2022-03-25 ENCOUNTER — OFFICE VISIT (OUTPATIENT)
Dept: INTERNAL MEDICINE | Facility: CLINIC | Age: 77
End: 2022-03-25

## 2022-03-25 ENCOUNTER — HOSPITAL ENCOUNTER (OUTPATIENT)
Dept: GENERAL RADIOLOGY | Facility: HOSPITAL | Age: 77
Discharge: HOME | End: 2022-03-25

## 2022-03-25 ENCOUNTER — TELEPHONE (OUTPATIENT)
Dept: INTERNAL MEDICINE | Facility: CLINIC | Age: 77
End: 2022-03-25

## 2022-03-25 VITALS
SYSTOLIC BLOOD PRESSURE: 124 MMHG | TEMPERATURE: 96.9 F | HEIGHT: 65 IN | WEIGHT: 133 LBS | HEART RATE: 84 BPM | OXYGEN SATURATION: 97 % | BODY MASS INDEX: 22.16 KG/M2 | DIASTOLIC BLOOD PRESSURE: 76 MMHG

## 2022-03-25 DIAGNOSIS — G89.29 CHRONIC RIGHT SHOULDER PAIN: ICD-10-CM

## 2022-03-25 DIAGNOSIS — M25.511 CHRONIC RIGHT SHOULDER PAIN: ICD-10-CM

## 2022-03-25 DIAGNOSIS — M25.511 CHRONIC RIGHT SHOULDER PAIN: Primary | ICD-10-CM

## 2022-03-25 DIAGNOSIS — G89.29 CHRONIC RIGHT SHOULDER PAIN: Primary | ICD-10-CM

## 2022-03-25 PROCEDURE — 99213 OFFICE O/P EST LOW 20 MIN: CPT | Performed by: STUDENT IN AN ORGANIZED HEALTH CARE EDUCATION/TRAINING PROGRAM

## 2022-03-25 RX ORDER — DESVENLAFAXINE SUCCINATE 50 MG/1
TABLET, EXTENDED RELEASE ORAL
COMMUNITY
Start: 2022-03-15

## 2022-03-25 RX ORDER — BUSPIRONE HYDROCHLORIDE 7.5 MG/1
7.5 TABLET ORAL 2 TIMES DAILY
COMMUNITY
Start: 2022-03-14 | End: 2022-09-03

## 2022-03-25 RX ORDER — DESVENLAFAXINE 25 MG/1
TABLET, EXTENDED RELEASE ORAL
COMMUNITY
Start: 2022-03-18 | End: 2022-09-03 | Stop reason: DRUGHIGH

## 2022-03-25 NOTE — TELEPHONE ENCOUNTER
PATIENT STATES THAT DR THRASHER WROTE HER X-RAY ORDER WRONG.  IT IS SUPPOSED TO BE OF HER NECK.  COULD SHE PLEASE CORRECT IT SO SHE CAN GET THE X-RAY TOMORROW?

## 2022-03-25 NOTE — PROGRESS NOTES
"              Internal Medicine Acute Visit     Patient Name: Maura Barry  : 1945   MRN: [id number]     Chief Complaint:    Chief Complaint   Patient presents with   • Neck Pain   • Shoulder Pain       History of Present Illness: Maura Barry is a 76 y.o. female who presents for chronic right shoulder pain. She reports pain worsened after having to wear a boot on her right foot for a period of time which changed how she was walking, causing pain on other joints.  Has been following with PT who suggested an x-ray to assess for bone spur.     Subjective     I have reviewed and the following portions of the patient's history were updated as appropriate: past family history, past medical history, past social history, past surgical history and problem list.    Allergies:   Allergies   Allergen Reactions   • Penicillins Hives   • Methocarbamol Itching       Objective     Physical Exam:  Vital Signs:   Vitals:    22 1123   BP: 124/76   Pulse: 84   Temp: 96.9 °F (36.1 °C)   SpO2: 97%   Weight: 60.3 kg (133 lb)   Height: 165.1 cm (65\")   PainSc:   7     Body mass index is 22.13 kg/m².    Physical Exam  Vitals and nursing note reviewed.   Constitutional:       General: She is not in acute distress.     Appearance: Normal appearance.   Cardiovascular:      Rate and Rhythm: Normal rate.   Pulmonary:      Effort: Pulmonary effort is normal. No respiratory distress.   Neurological:      Mental Status: She is alert.   Psychiatric:         Mood and Affect: Mood normal.         Behavior: Behavior normal.       Assessment / Plan      Assessment/Plan:   Diagnoses and all orders for this visit:    1. Chronic right shoulder pain (Primary)  Right shoulder x-ray (AP and lateral views along with flexion view) to assess for OA and bone spur. Continue following with PT.     Follow Up:   Return for Next scheduled follow up.    Time:   I spent approximately 10 minutes providing clinical care for this patient; including " review of patient's chart and provider documentation, face to face time spent with patient in examination room (obtaining history, performing physical exam, discussing diagnosis and management options), placing orders, and completing patient documentation.     Kristen Byrd MD  [facility]

## 2022-03-27 ENCOUNTER — PATIENT MESSAGE (OUTPATIENT)
Dept: INTERNAL MEDICINE | Facility: CLINIC | Age: 77
End: 2022-03-27

## 2022-03-28 ENCOUNTER — HOSPITAL ENCOUNTER (OUTPATIENT)
Dept: GENERAL RADIOLOGY | Facility: HOSPITAL | Age: 77
Discharge: HOME | End: 2022-03-28
Admitting: STUDENT IN AN ORGANIZED HEALTH CARE EDUCATION/TRAINING PROGRAM

## 2022-03-28 DIAGNOSIS — M25.511 CHRONIC RIGHT SHOULDER PAIN: ICD-10-CM

## 2022-03-28 DIAGNOSIS — G89.29 CHRONIC RIGHT SHOULDER PAIN: ICD-10-CM

## 2022-03-28 PROCEDURE — 72040 X-RAY EXAM NECK SPINE 2-3 VW: CPT

## 2022-08-11 ENCOUNTER — PATIENT MESSAGE (OUTPATIENT)
Dept: INTERNAL MEDICINE | Facility: CLINIC | Age: 77
End: 2022-08-11

## 2022-08-11 NOTE — TELEPHONE ENCOUNTER
Aspen Antony MA 8/11/2022 8:28 AM EDT      ----- Message -----  From: Maura Barry  Sent: 8/11/2022 8:21 AM EDT  To: [facility] Clinical Pool  Subject: Covid anti-viral     Hi Dr. MARTINEZ, I hope you are well. Kunal and I will be going to [place] for a week. We are leaving Sept. 8. It’s been recommended to us to take the anti viral meds with us in case we come down with Covid. We will be taking our self tests with us. I understand that the antiviral must be taken asap after a positive dx. Would you be willing to prescribe that for me? Thanks, Portia Barry

## 2022-08-31 ENCOUNTER — OFFICE VISIT (OUTPATIENT)
Dept: INTERNAL MEDICINE | Facility: CLINIC | Age: 77
End: 2022-08-31

## 2022-08-31 VITALS
SYSTOLIC BLOOD PRESSURE: 122 MMHG | WEIGHT: 136 LBS | HEART RATE: 83 BPM | OXYGEN SATURATION: 97 % | BODY MASS INDEX: 22.66 KG/M2 | DIASTOLIC BLOOD PRESSURE: 70 MMHG | HEIGHT: 65 IN | TEMPERATURE: 97.9 F

## 2022-08-31 DIAGNOSIS — M54.31 SCIATICA OF RIGHT SIDE: ICD-10-CM

## 2022-08-31 DIAGNOSIS — M24.851 RIGHT HIP CREPITUS: Primary | ICD-10-CM

## 2022-08-31 PROCEDURE — 99214 OFFICE O/P EST MOD 30 MIN: CPT | Performed by: INTERNAL MEDICINE

## 2022-08-31 RX ORDER — ESTRADIOL 0.1 MG/G
CREAM VAGINAL
COMMUNITY
Start: 2022-08-08

## 2022-08-31 RX ORDER — TRAMADOL HYDROCHLORIDE 50 MG/1
50 TABLET ORAL 2 TIMES DAILY PRN
Qty: 60 TABLET | Refills: 1 | Status: SHIPPED | OUTPATIENT
Start: 2022-08-31

## 2022-08-31 RX ORDER — BACLOFEN 10 MG/1
TABLET ORAL
COMMUNITY
Start: 2022-08-08 | End: 2023-01-06

## 2022-08-31 RX ORDER — HYDROCODONE BITARTRATE AND ACETAMINOPHEN 5; 325 MG/1; MG/1
TABLET ORAL
COMMUNITY
Start: 2022-07-14 | End: 2023-01-06

## 2022-09-01 ENCOUNTER — HOSPITAL ENCOUNTER (OUTPATIENT)
Dept: GENERAL RADIOLOGY | Facility: HOSPITAL | Age: 77
Discharge: HOME | End: 2022-09-01
Admitting: INTERNAL MEDICINE

## 2022-09-01 ENCOUNTER — TELEPHONE (OUTPATIENT)
Dept: INTERNAL MEDICINE | Facility: CLINIC | Age: 77
End: 2022-09-01

## 2022-09-01 DIAGNOSIS — M24.851 RIGHT HIP CREPITUS: ICD-10-CM

## 2022-09-01 PROCEDURE — 73502 X-RAY EXAM HIP UNI 2-3 VIEWS: CPT

## 2022-09-02 NOTE — TELEPHONE ENCOUNTER
----- Message from Aspen Antony MA sent at 9/1/2022  1:49 PM EDT -----  Regarding: FW: Documentation of back injury    ----- Message -----  From: Maura Barry  Sent: 9/1/2022   1:37 PM EDT  To: [facility]  Subject: Documentation of back injury                     Dr. LESLIE I have had my hip xrayed and good news; it is fine. Dr. Real gave me a steroid injection which should start to take effect in 3-4 days. I have the tramadol to tide me over.     Kunal and I have decided to cancel our [place] trip. I have purchased travel insurance. They need a doctor's verification of my condition. Could you please provide me with this letter on your letterhead and have your staff scan and email it to me? My email is [email or web address].    Thank you so much for your help. Knowing that you care sure helps me feel better.  Portia Barry

## 2022-09-08 ENCOUNTER — TELEPHONE (OUTPATIENT)
Dept: INTERNAL MEDICINE | Facility: CLINIC | Age: 77
End: 2022-09-08

## 2022-09-08 NOTE — TELEPHONE ENCOUNTER
Caller: Maura Barry    Relationship to patient: Self    Best call back number: [phone]    Patient is needing: PATIENT STATED THAT SHE DROPPED OFF FORM FOR PROVIDER TO BE FILLED OUT ON Friday LAST WEEK AND WANTED TO FIND OUT IF THIS HAS BEEN COMPLETED    PLEASE ADVISE

## 2022-09-12 NOTE — TELEPHONE ENCOUNTER
Pt states that she does not need letter.  Wanting you to know that she has had MRI at , looks like she will need surgery.

## 2022-12-21 DIAGNOSIS — K21.9 GASTROESOPHAGEAL REFLUX DISEASE WITHOUT ESOPHAGITIS: ICD-10-CM

## 2022-12-21 RX ORDER — PANTOPRAZOLE SODIUM 40 MG/1
40 TABLET, DELAYED RELEASE ORAL DAILY
Qty: 90 TABLET | Refills: 0 | Status: SHIPPED | OUTPATIENT
Start: 2022-12-21 | End: 2023-01-06 | Stop reason: SDUPTHER

## 2023-01-05 ENCOUNTER — LAB (OUTPATIENT)
Dept: LAB | Facility: HOSPITAL | Age: 78
End: 2023-01-05
Payer: MEDICARE

## 2023-01-05 DIAGNOSIS — E61.1 IRON DEFICIENCY: ICD-10-CM

## 2023-01-05 DIAGNOSIS — E78.5 DYSLIPIDEMIA: ICD-10-CM

## 2023-01-05 DIAGNOSIS — R73.09 ELEVATED GLUCOSE: ICD-10-CM

## 2023-01-05 DIAGNOSIS — E55.9 VITAMIN D DEFICIENCY: ICD-10-CM

## 2023-01-05 DIAGNOSIS — E53.8 B12 DEFICIENCY: ICD-10-CM

## 2023-01-06 ENCOUNTER — OFFICE VISIT (OUTPATIENT)
Dept: INTERNAL MEDICINE | Facility: CLINIC | Age: 78
End: 2023-01-06
Payer: MEDICARE

## 2023-01-06 VITALS
TEMPERATURE: 97.8 F | BODY MASS INDEX: 22.42 KG/M2 | HEART RATE: 87 BPM | HEIGHT: 65 IN | WEIGHT: 134.6 LBS | OXYGEN SATURATION: 96 % | DIASTOLIC BLOOD PRESSURE: 60 MMHG | SYSTOLIC BLOOD PRESSURE: 108 MMHG

## 2023-01-06 DIAGNOSIS — M77.12 LATERAL EPICONDYLITIS OF LEFT ELBOW: ICD-10-CM

## 2023-01-06 DIAGNOSIS — E55.9 VITAMIN D DEFICIENCY: ICD-10-CM

## 2023-01-06 DIAGNOSIS — M81.0 POSTMENOPAUSAL OSTEOPOROSIS: ICD-10-CM

## 2023-01-06 DIAGNOSIS — R21 FACIAL RASH: ICD-10-CM

## 2023-01-06 DIAGNOSIS — Z00.00 MEDICARE ANNUAL WELLNESS VISIT, SUBSEQUENT: Primary | ICD-10-CM

## 2023-01-06 DIAGNOSIS — E61.1 IRON DEFICIENCY: ICD-10-CM

## 2023-01-06 DIAGNOSIS — R21 RASH: ICD-10-CM

## 2023-01-06 DIAGNOSIS — E78.5 DYSLIPIDEMIA: ICD-10-CM

## 2023-01-06 DIAGNOSIS — K21.9 GASTROESOPHAGEAL REFLUX DISEASE WITHOUT ESOPHAGITIS: ICD-10-CM

## 2023-01-06 DIAGNOSIS — M77.02 MEDIAL EPICONDYLITIS OF LEFT ELBOW: ICD-10-CM

## 2023-01-06 DIAGNOSIS — Z78.0 POSTMENOPAUSAL: ICD-10-CM

## 2023-01-06 DIAGNOSIS — R73.09 ELEVATED GLUCOSE: ICD-10-CM

## 2023-01-06 DIAGNOSIS — E53.8 B12 DEFICIENCY: ICD-10-CM

## 2023-01-06 LAB
25(OH)D3+25(OH)D2 SERPL-MCNC: 49.1 NG/ML (ref 30–100)
ALBUMIN SERPL-MCNC: 4.4 G/DL (ref 3.5–5.2)
ALBUMIN/GLOB SERPL: 2.1 G/DL
ALP SERPL-CCNC: 76 U/L (ref 39–117)
ALT SERPL-CCNC: 20 U/L (ref 1–33)
AST SERPL-CCNC: 23 U/L (ref 1–32)
BILIRUB SERPL-MCNC: 0.3 MG/DL (ref 0–1.2)
BUN SERPL-MCNC: 27 MG/DL (ref 8–23)
BUN/CREAT SERPL: 27.8 (ref 7–25)
CALCIUM SERPL-MCNC: 9.5 MG/DL (ref 8.6–10.5)
CHLORIDE SERPL-SCNC: 102 MMOL/L (ref 98–107)
CHOLEST SERPL-MCNC: 210 MG/DL (ref 0–200)
CO2 SERPL-SCNC: 29.2 MMOL/L (ref 22–29)
CREAT SERPL-MCNC: 0.97 MG/DL (ref 0.57–1)
EGFRCR SERPLBLD CKD-EPI 2021: 61.1 ML/MIN/1.73
ERYTHROCYTE [DISTWIDTH] IN BLOOD BY AUTOMATED COUNT: 11.8 % (ref 12.3–15.4)
FOLATE SERPL-MCNC: 14.4 NG/ML (ref 4.78–24.2)
GLOBULIN SER CALC-MCNC: 2.1 GM/DL
GLUCOSE SERPL-MCNC: 88 MG/DL (ref 65–99)
HBA1C MFR BLD: 5.6 % (ref 4.8–5.6)
HCT VFR BLD AUTO: 42.5 % (ref 34–46.6)
HDLC SERPL-MCNC: 66 MG/DL (ref 40–60)
HGB BLD-MCNC: 14 G/DL (ref 12–15.9)
IRON SATN MFR SERPL: 23 % (ref 20–50)
IRON SERPL-MCNC: 93 MCG/DL (ref 37–145)
LDLC SERPL CALC-MCNC: 124 MG/DL (ref 0–100)
MCH RBC QN AUTO: 32.6 PG (ref 26.6–33)
MCHC RBC AUTO-ENTMCNC: 32.9 G/DL (ref 31.5–35.7)
MCV RBC AUTO: 98.8 FL (ref 79–97)
PLATELET # BLD AUTO: 282 10*3/MM3 (ref 140–450)
POTASSIUM SERPL-SCNC: 4.6 MMOL/L (ref 3.5–5.2)
PROT SERPL-MCNC: 6.5 G/DL (ref 6–8.5)
RBC # BLD AUTO: 4.3 10*6/MM3 (ref 3.77–5.28)
SODIUM SERPL-SCNC: 143 MMOL/L (ref 136–145)
TIBC SERPL-MCNC: 405 MCG/DL
TRIGL SERPL-MCNC: 112 MG/DL (ref 0–150)
TSH SERPL DL<=0.005 MIU/L-ACNC: 2.33 UIU/ML (ref 0.27–4.2)
UIBC SERPL-MCNC: 312 MCG/DL (ref 112–346)
VLDLC SERPL CALC-MCNC: 20 MG/DL (ref 5–40)
WBC # BLD AUTO: 6.17 10*3/MM3 (ref 3.4–10.8)

## 2023-01-06 PROCEDURE — G0444 DEPRESSION SCREEN ANNUAL: HCPCS | Performed by: INTERNAL MEDICINE

## 2023-01-06 PROCEDURE — 96160 PT-FOCUSED HLTH RISK ASSMT: CPT | Performed by: INTERNAL MEDICINE

## 2023-01-06 PROCEDURE — 1170F FXNL STATUS ASSESSED: CPT | Performed by: INTERNAL MEDICINE

## 2023-01-06 PROCEDURE — 1159F MED LIST DOCD IN RCRD: CPT | Performed by: INTERNAL MEDICINE

## 2023-01-06 PROCEDURE — G0439 PPPS, SUBSEQ VISIT: HCPCS | Performed by: INTERNAL MEDICINE

## 2023-01-06 PROCEDURE — 1125F AMNT PAIN NOTED PAIN PRSNT: CPT | Performed by: INTERNAL MEDICINE

## 2023-01-06 RX ORDER — FAMOTIDINE 40 MG/1
40 TABLET, FILM COATED ORAL DAILY
Qty: 30 TABLET | Refills: 5 | Status: SHIPPED | OUTPATIENT
Start: 2023-01-06

## 2023-01-06 RX ORDER — PANTOPRAZOLE SODIUM 40 MG/1
40 TABLET, DELAYED RELEASE ORAL DAILY
Qty: 90 TABLET | Refills: 3 | Status: SHIPPED | OUTPATIENT
Start: 2023-01-06

## 2023-01-06 NOTE — PROGRESS NOTES
The ABCs of the Annual Wellness Visit  Subsequent Medicare Wellness Visit    Subjective    Maura Barry is a 77 y.o. female who presents for a Subsequent Medicare Wellness Visit.    She states she is doing well overall. Patient will be due for a DEXA scan in 02/2023 and a mammogram in 06/2023. Her last Reclast infusion was completed in 04/2021. Additional complaints and concerns outside of wellness are detailed below.    The following portions of the patient's history were reviewed and   updated as appropriate: allergies, current medications, past family history, past medical history, past social history, past surgical history and problem list.    Compared to one year ago, the patient feels her physical   health is the same.    Compared to one year ago, the patient feels her mental   health is worse.    Recent Hospitalizations:  She was not admitted to the hospital during the last year.       Current Medical Providers:  Patient Care Team:  Bronwyn Bird MD as PCP - General (Internal Medicine)  Grace Bell MD as Consulting Physician (Dermatology)    Outpatient Medications Prior to Visit   Medication Sig Dispense Refill   • ALLERGY SERUM INJECTION Inject  under the skin 1 (One) Time Per Week.     • Ascorbic Acid (VITAMIN C) 500 MG capsule Take 1,000 mg by mouth Daily.     • buPROPion XL (WELLBUTRIN XL) 300 MG 24 hr tablet Take 1 tablet by mouth Daily.  2   • Calcium-Magnesium 300-300 MG tablet Take 2 tablets by mouth Daily.     • Cholecalciferol (VITAMIN D3) 5000 units capsule capsule Take 5,000 Units by mouth Daily.     • Cyanocobalamin 1000 MCG sublingual tablet Place 1 tablet under the tongue Daily. 30 each 5   • desvenlafaxine (PRISTIQ) 50 MG 24 hr tablet      • estradiol (ESTRACE) 0.1 MG/GM vaginal cream INSERT ONE GRAM VAGINALLY TWO TO THREE TIMES PER WEEK AS DIRECTED     • Glucosamine HCl 1000 MG tablet Take 2,000 mg by mouth Daily.     • magnesium oxide (MAGOX) 400 (241.3 Mg) MG tablet tablet  Take 400 mg by mouth Daily.     • montelukast (Singulair) 10 MG tablet One PO daily 90 tablet 1   • traMADol (ULTRAM) 50 MG tablet Take 1 tablet by mouth 2 (Two) Times a Day As Needed for Moderate Pain. 60 tablet 1   • traZODone (DESYREL) 100 MG tablet Take 100 mg by mouth every night at bedtime.     • famotidine (PEPCID) 40 MG tablet Take 1 tablet by mouth Daily. 30 tablet 5   • pantoprazole (PROTONIX) 40 MG EC tablet TAKE 1 TABLET BY MOUTH DAILY 90 tablet 0   • zoledronic acid (ZOMETA) 4 MG/100ML solution infusion (premix) Infuse 100 mL into a venous catheter 1 (One) Time for 1 dose. 100 mL 0   • baclofen (LIORESAL) 10 MG tablet TAKE 1 TABLET BY MOUTH AT BEDTIME TO START. SLOWLY INCREASE TO 1 TWICE DAILY AND TO A MAX OF 2 THREE TIMES DAILY AS NEEDED. DO NOT STOP ABRUPTLY     • HYDROcodone-acetaminophen (NORCO) 5-325 MG per tablet TAKE 1 TABLET EVERY 4-6 HOURS AS NEEDED FOR PAIN     • memantine (NAMENDA) 10 MG tablet Take 10 mg by mouth 2 (Two) Times a Day.     • Omega-3 Fatty Acids (FISH OIL) 1000 MG capsule capsule Take 1,000 mg by mouth Daily With Breakfast.     • VIIBRYD 20 MG tablet tablet Take 1 tablet by mouth Daily.     • Wal-phed 12 Hour 120 MG 12 hr tablet TAKE 1 TABLET BY MOUTH EVERY 12 HOURS 60 tablet 5     No facility-administered medications prior to visit.       Opioid medication/s are on active medication list.  and I have evaluated her active treatment plan and pain score trends (see table).  Vitals:    01/06/23 0846   PainSc:   6   PainLoc: Back     I have reviewed the chart for potential of high risk medication and harmful drug interactions in the elderly.            Aspirin is not on active medication list.  Aspirin use is not indicated based on review of current medical condition/s. Risk of harm outweighs potential benefits.  .    Patient Active Problem List   Diagnosis   • KRISTY (obstructive sleep apnea)   • B12 deficiency   • Iron deficiency   • Dyslipidemia   • Other fatigue   • Menopausal  osteoporosis     Advance Care Planning  Advance Directive is on file.  ACP discussion was held with the patient during this visit. Patient has an advance directive in EMR which is still valid.      Objective    Vitals:    01/06/23 0846   BP: 108/60   Pulse: 87   Temp: 97.8 °F (36.6 °C)   SpO2: 96%  Comment: ra   Weight: 61.1 kg (134 lb 9.6 oz)   Height: 165.1 cm (65\")   PainSc:   6   PainLoc: Back     Estimated body mass index is 22.4 kg/m² as calculated from the following:    Height as of this encounter: 165.1 cm (65\").    Weight as of this encounter: 61.1 kg (134 lb 9.6 oz).    BMI is within normal parameters. No other follow-up for BMI required.      Does the patient have evidence of cognitive impairment? No    Lab Results   Component Value Date    CHLPL 210 (H) 01/05/2023    TRIG 112 01/05/2023    HDL 66 (H) 01/05/2023     (H) 01/05/2023    VLDL 20 01/05/2023    HGBA1C 5.60 01/05/2023    HGBA1C 5.4 10/27/2022        HEALTH RISK ASSESSMENT    Smoking Status:  Social History     Tobacco Use   Smoking Status Never   Smokeless Tobacco Never     Alcohol Consumption:  Social History     Substance and Sexual Activity   Alcohol Use Yes   • Alcohol/week: 1.0 standard drink   • Types: 1 Drinks containing 0.5 oz of alcohol per week    Comment: 1 or less a week     Fall Risk Screen:    BUFFYADI Fall Risk Assessment was completed, and patient is at LOW risk for falls.Assessment completed on:1/6/2023    Depression Screening:  PHQ-2/PHQ-9 Depression Screening 1/6/2023   Little Interest or Pleasure in Doing Things 0-->not at all   Feeling Down, Depressed or Hopeless 0-->not at all   PHQ-9: Brief Depression Severity Measure Score 0       Health Habits and Functional and Cognitive Screening:  Functional & Cognitive Status 1/6/2023   Do you have difficulty preparing food and eating? No   Do you have difficulty bathing yourself, getting dressed or grooming yourself? No   Do you have difficulty using the toilet? No   Do you  have difficulty moving around from place to place? No   Do you have trouble with steps or getting out of a bed or a chair? No   Current Diet Well Balanced Diet   Dental Exam Up to date   Eye Exam Up to date   Exercise (times per week) 7 times per week   Current Exercises Include Walking        Exercise Comment -   Current Exercise Activities Include -   Do you need help using the phone?  No   Are you deaf or do you have serious difficulty hearing?  No   Do you need help with transportation? No   Do you need help shopping? No   Do you need help preparing meals?  No   Do you need help with housework?  No   Do you need help with laundry? No   Do you need help taking your medications? No   Do you need help managing money? No   Do you ever drive or ride in a car without wearing a seat belt? No   Have you felt unusual stress, anger or loneliness in the last month? No   Who do you live with? Alone   If you need help, do you have trouble finding someone available to you? No   Have you been bothered in the last four weeks by sexual problems? No   Do you have difficulty concentrating, remembering or making decisions? Yes       Age-appropriate Screening Schedule:  Refer to the list below for future screening recommendations based on patient's age, sex and/or medical conditions. Orders for these recommended tests are listed in the plan section. The patient has been provided with a written plan.    Health Maintenance   Topic Date Due   • DXA SCAN  02/01/2023   • MAMMOGRAM  06/22/2023   • LIPID PANEL  01/05/2024   • TDAP/TD VACCINES (4 - Td or Tdap) 07/11/2026   • INFLUENZA VACCINE  Completed   • ZOSTER VACCINE  Completed                CMS Preventative Services Quick Reference  Risk Factors Identified During Encounter  Chronic Pain: Natural history and expected course discussed. Questions answered.  Fall Risk-High or Moderate: Discussed Fall Prevention in the home  Immunizations Discussed/Encouraged: utd  Urinary Incontinence:  referred to therapy  The above risks/problems have been discussed with the patient.  Pertinent information has been shared with the patient in the After Visit Summary.  An After Visit Summary and PPPS were made available to the patient.    Follow Up:   Next Medicare Wellness visit to be scheduled in 1 year.       Additional E&M Note during same encounter follows:  Patient has multiple medical problems which are significant and separately identifiable that require additional work above and beyond the Medicare Wellness Visit.      Chief Complaint  Medicare Wellness-subsequent    Subjective        HPI  Maura Barry is also being seen today for a follow up on her L2/3 right laminectomy done by Dr. Heller on 11/8/22. Recovering, still has some pain, will be starting PT for her back next week.  Getting Pelvic floor exercises.   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 Real. To have a Gyn eval for tissue damage and if ok- will proceed with a steroid shot.  Memory issues- seen by Dr. Cuevas and Dr. Slade, her Namenda was increased to 28 mg SR.  Stays itchy all over, worse in the face and it gets red and itchy- seen by her allergist, thinks it may be due to chocolate.    The patient reports having undergone a laminectomy at L2 and L3 on 11/08/2022 by Dr. Rickey Heller, which has improved her pain significantly. Follow up with Dr. Heller is scheduled for 02/23/2023 and formal physical therapy has been ordered to begin in the near future. She notes having good and bad days somewhat dependent on her activity level and occasionally waking up with back pain, which she takes ibuprofen for as needed.      At this point, she has been seeing Vinod Ventura, physical therapist for more than 1 year for pelvic floor therapy. She notes ongoing pelvic floor concerns and states wearing pants that are not very tight causes significant irritation. Per her report, the physical therapist's latest  theory is that her pelvic floor issue is related to the pudendal nerve and a diagnostic injection to the pudendal nerve was administered by Dr. Jace Real, which helped one of the nerves significantly. There was one nerve which was unaffected by the injection and Dr. Real recommended she see her gynecologist to rule out tissue damage. If there is no tissue damage present, steroid injection to the previously unaffected nerve will be administered. An appointment with Gynecology is currently scheduled for the end of 01/2023, though she may try to get into Urology sooner with REGGIE Rivas.    Her memory continues to be an issue and she reports her daughter and psychologist friend believe she may have early-stage Parkinson's disease due to mouth movements and occasional pill rolling. Of note, she states her neurologist, Dr. Sabine Cuevas doubts a diagnosis of Parkinson's disease but was very encouraging and increased her memantine to an SR dose. She does recall noticing a diagnosis of moderate level Alzheimer's disease on her after visit summary and has yet to speak with Dr. Cuevas regarding this. In the past, she was part of an Alzheimer's research study and was unable to complete the process due to complications with a lumbar puncture they required but she does recall remembering less of the 20 words than she ever had in the past. Patient completed a MoCA on 01/02/2023 with Dr. Shaun Slade and states he advised her she did not have any signs of Alzheimer's disease as she scored 28/30. However, on word recall today she was only able to remember 2/3 words.    A sleep study has been recommended and ordered by Dr. Slade and she confirms having received the call to schedule.     She complains of itching across her body and notes itching and redness of the face and left cheek. For this she uses Benadryl as needed during the day. Per her report, her allergist advised this is likely due to an allergic reaction to  something. She mentions eating more chocolate recently than the typically does and having begun adding protein powder to her almond milk with her cereal for more protein as cow milk caused constipation and inflammation.     Because she was unable to bend over for 2 to 3 weeks following her laminectomy, she used a \"grabber\" frequently, which has resulted in left medial and lateral elbow pain with lifting and straightening the arm. She does have Voltaren gel available at home to use and would like to postpone oral steroids at this time.      Additionally, she states the wart she had removed from her  last year has returned and is painful if she bumps it on something.            Objective   Vital Signs:  /60   Pulse 87   Temp 97.8 °F (36.6 °C)   Ht 165.1 cm (65\")   Wt 61.1 kg (134 lb 9.6 oz)   SpO2 96% Comment: ra  BMI 22.40 kg/m²     Physical Exam  Constitutional:       General: She is not in acute distress.     Appearance: Normal appearance.   HENT:      Head: Normocephalic and atraumatic.      Right Ear: Tympanic membrane and external ear normal.      Left Ear: Tympanic membrane and external ear normal.      Nose: Nose normal.      Mouth/Throat:      Mouth: Mucous membranes are moist.   Eyes:      General: No scleral icterus.  Neck:      Vascular: No carotid bruit.   Cardiovascular:      Rate and Rhythm: Normal rate and regular rhythm.      Pulses: Normal pulses.      Heart sounds: Normal heart sounds. No murmur heard.    No friction rub. No gallop.   Pulmonary:      Effort: Pulmonary effort is normal.      Breath sounds: Normal breath sounds. No rhonchi or rales.   Abdominal:      General: Bowel sounds are normal. There is no distension.      Palpations: Abdomen is soft.      Tenderness: There is no right CVA tenderness, left CVA tenderness, guarding or rebound.      Hernia: No hernia is present.   Musculoskeletal:         General: No tenderness. Normal range of motion.      Cervical back: Normal range  of motion.      Right lower leg: No edema.      Left lower leg: No edema.   Lymphadenopathy:      Cervical: No cervical adenopathy.   Skin:     General: Skin is warm.      Findings: No rash.   Neurological:      General: No focal deficit present.      Mental Status: She is alert and oriented to person, place, and time. Mental status is at baseline.      Cranial Nerves: No cranial nerve deficit.      Sensory: No sensory deficit.      Coordination: Coordination normal.      Gait: Gait normal.      Deep Tendon Reflexes: Reflexes normal.   Psychiatric:         Mood and Affect: Mood normal.         Behavior: Behavior normal.       CBC (No Diff) (01/05/2023 08:06)  Comprehensive Metabolic Panel (01/05/2023 08:06)  Lipid Panel (01/05/2023 08:06)  TSH (01/05/2023 08:06)  Vitamin D,25-Hydroxy (01/05/2023 08:06)  Iron Profile (01/05/2023 08:06)  Hemoglobin A1c (01/05/2023 08:06)  Folate (01/05/2023 08:06)  Vitamin B12 (01/05/2023 00:00)                   Assessment and Plan   Diagnoses and all orders for this visit:    1. Medicare annual wellness visit, subsequent (Primary)    2. Dyslipidemia  -     Comprehensive Metabolic Panel; Future  -     Lipid Panel; Future  -     TSH; Future    3. B12 deficiency  -     Vitamin B12; Future  -     Folate; Future  -     Methylmalonic Acid, Serum; Future  -     Intrinsic Factor Ab; Future    4. Vitamin D deficiency  -     Vitamin D,25-Hydroxy; Future    5. Elevated glucose  -     Hemoglobin A1c; Future    6. Iron deficiency  -     CBC (No Diff); Future  -     Vitamin B12; Future  -     Iron Profile; Future  -     Transferrin Saturation; Future    7. Gastroesophageal reflux disease without esophagitis  -     famotidine (PEPCID) 40 MG tablet; Take 1 tablet by mouth Daily.  Dispense: 30 tablet; Refill: 5  -     pantoprazole (PROTONIX) 40 MG EC tablet; Take 1 tablet by mouth Daily.  Dispense: 90 tablet; Refill: 3    8. Rash  -     famotidine (PEPCID) 40 MG tablet; Take 1 tablet by mouth Daily.   Dispense: 30 tablet; Refill: 5    9. Facial rash  Comments:  likely due to rosacea, she will see her dermatologist and discuss then.    10. Medial epicondylitis of left elbow  -     Ambulatory Referral to Physical Therapy Evaluate and treat  -     Diclofenac Sodium (Voltaren) 1 % gel gel; Apply 4 g topically to the appropriate area as directed 3 (Three) Times a Day.    11. Lateral epicondylitis of left elbow  -     Ambulatory Referral to Physical Therapy Evaluate and treat  -     Diclofenac Sodium (Voltaren) 1 % gel gel; Apply 4 g topically to the appropriate area as directed 3 (Three) Times a Day.    12. Postmenopausal  -     DEXA Bone Density Axial; Future    13. Postmenopausal osteoporosis  -     Ambulatory Referral to [facility] For Infusion Treatment             Follow Up   Return in about 6 months (around 7/6/2023) for Next scheduled follow up and wellness in 1 year.  Patient was given instructions and counseling regarding her condition or for health maintenance advice. Please see specific information pulled into the AVS if appropriate.     Transcribed from ambient dictation for Bronwyn Bird MD by Hamida Buckner.  01/06/23   13:15 EST    Patient or patient representative verbalized consent to the visit recording.

## 2023-01-09 LAB
IF BLOCK AB SER-ACNC: 1.1 AU/ML (ref 0–1.1)
IRON SATN MFR SERPL: 25 % SATURATION
IRON SERPL-MCNC: 102 UG/DL
METHYLMALONATE SERPL-SCNC: 169 NMOL/L (ref 0–378)
TRANSFERRIN SERPL-MCNC: 286 MG/DL

## 2023-02-28 ENCOUNTER — HOSPITAL ENCOUNTER (OUTPATIENT)
Dept: BONE DENSITY | Facility: HOSPITAL | Age: 78
Discharge: HOME | End: 2023-02-28
Admitting: INTERNAL MEDICINE
Payer: MEDICARE

## 2023-02-28 ENCOUNTER — TELEPHONE (OUTPATIENT)
Dept: INTERNAL MEDICINE | Facility: CLINIC | Age: 78
End: 2023-02-28
Payer: MEDICARE

## 2023-02-28 DIAGNOSIS — Z78.0 POSTMENOPAUSAL: ICD-10-CM

## 2023-02-28 PROCEDURE — 77080 DXA BONE DENSITY AXIAL: CPT

## 2023-02-28 RX ORDER — ZOLEDRONIC ACID 5 MG/100ML
5 INJECTION, SOLUTION INTRAVENOUS ONCE
Qty: 100 ML | Refills: 0
Start: 2023-02-28 | End: 2023-02-28

## 2023-02-28 NOTE — TELEPHONE ENCOUNTER
----- Message from Yael Mena RN sent at 2/28/2023  2:01 PM EST -----  Regarding: Reclast  Please place order for Reclast on 3/6.  Thank you!

## 2023-03-03 RX ORDER — ZOLEDRONIC ACID 5 MG/100ML
5 INJECTION, SOLUTION INTRAVENOUS ONCE
Status: CANCELLED | OUTPATIENT
Start: 2023-03-06

## 2023-03-06 ENCOUNTER — INFUSION (OUTPATIENT)
Dept: ONCOLOGY | Facility: HOSPITAL | Age: 78
End: 2023-03-06
Payer: MEDICARE

## 2023-03-06 VITALS
BODY MASS INDEX: 21.83 KG/M2 | TEMPERATURE: 97.4 F | WEIGHT: 131 LBS | DIASTOLIC BLOOD PRESSURE: 65 MMHG | HEART RATE: 79 BPM | SYSTOLIC BLOOD PRESSURE: 125 MMHG | HEIGHT: 65 IN | RESPIRATION RATE: 16 BRPM

## 2023-03-06 DIAGNOSIS — M81.0 MENOPAUSAL OSTEOPOROSIS: Primary | ICD-10-CM

## 2023-03-06 LAB — CREAT BLDA-MCNC: 0.9 MG/DL (ref 0.6–1.3)

## 2023-03-06 PROCEDURE — 96375 TX/PRO/DX INJ NEW DRUG ADDON: CPT

## 2023-03-06 PROCEDURE — 25010000002 ZOLEDRONIC ACID 5 MG/100ML SOLUTION: Performed by: INTERNAL MEDICINE

## 2023-03-06 PROCEDURE — 82565 ASSAY OF CREATININE: CPT | Performed by: INTERNAL MEDICINE

## 2023-03-06 PROCEDURE — 96374 THER/PROPH/DIAG INJ IV PUSH: CPT

## 2023-03-06 RX ORDER — ZOLEDRONIC ACID 5 MG/100ML
5 INJECTION, SOLUTION INTRAVENOUS ONCE
Status: COMPLETED | OUTPATIENT
Start: 2023-03-06 | End: 2023-03-06

## 2023-03-06 RX ORDER — ZOLEDRONIC ACID 5 MG/100ML
5 INJECTION, SOLUTION INTRAVENOUS ONCE
Status: CANCELLED | OUTPATIENT
Start: 2023-03-06

## 2023-03-06 RX ADMIN — ZOLEDRONIC ACID 5 MG: 5 INJECTION, SOLUTION INTRAVENOUS at 09:39

## 2023-04-24 DIAGNOSIS — L85.3 XEROSIS CUTIS: ICD-10-CM

## 2023-04-24 RX ORDER — TRIAMCINOLONE ACETONIDE 0.25 MG/G
CREAM TOPICAL
Qty: 80 G | Refills: 5 | OUTPATIENT
Start: 2023-04-24

## 2023-04-25 ENCOUNTER — PATIENT MESSAGE (OUTPATIENT)
Dept: INTERNAL MEDICINE | Facility: CLINIC | Age: 78
End: 2023-04-25
Payer: MEDICARE

## 2023-04-25 RX ORDER — TRIAMCINOLONE ACETONIDE 0.25 MG/G
1 CREAM TOPICAL 2 TIMES DAILY
Qty: 80 G | Refills: 1 | Status: SHIPPED | OUTPATIENT
Start: 2023-04-25

## 2023-04-26 NOTE — TELEPHONE ENCOUNTER
"Briseida Rosales MA 4/25/2023 9:42 AM EDT      ----- Message -----  From: Maura Barry \"Dorota\"  Sent: 4/25/2023 9:33 AM EDT  To: [facility] Clinical Pool  Subject: Steroid cream     Hi Dr. MARTINEZ, my pharmacy indicated that you were unwilling to refill my steroid cream triamcinolone, acetonide cream, USP. I would love for you to please refill it for me. I depend on it daily for soothing the itchy spots on my body. That come from emerging skin tags? It’s very important for me to keep from going crazy from the itching. Thank you for considering this, Dorota    "

## 2023-05-11 ENCOUNTER — PATIENT MESSAGE (OUTPATIENT)
Dept: INTERNAL MEDICINE | Facility: CLINIC | Age: 78
End: 2023-05-11
Payer: MEDICARE

## 2023-05-11 DIAGNOSIS — Z12.31 SCREENING MAMMOGRAM FOR HIGH-RISK PATIENT: ICD-10-CM

## 2023-05-11 DIAGNOSIS — H91.90 HEARING LOSS, UNSPECIFIED HEARING LOSS TYPE, UNSPECIFIED LATERALITY: Primary | ICD-10-CM

## 2023-05-12 NOTE — TELEPHONE ENCOUNTER
"Tanja Oliveros MA 5/11/2023 2:51 PM EDT      ----- Message -----  From: Maura Barry \"Dorota\"  Sent: 5/11/2023 2:49 PM EDT  To: [facility] Clinical Pool  Subject: 2 things: hearing test; mammogram     Dr. LESLIE I had my hearing screened today and it revealed a mild hearing loss and more in depth eval. For Medicare to pay for it, I need a doctor's order. Will you please fax an order to [phone]. It is [facility].  Also, I searched [employer] for my last mammogram. I could only find one from Spring 2021. Could you order one for me, please?    "

## 2023-05-16 ENCOUNTER — TRANSCRIBE ORDERS (OUTPATIENT)
Dept: ADMINISTRATIVE | Facility: HOSPITAL | Age: 78
End: 2023-05-16
Payer: MEDICARE

## 2023-05-16 DIAGNOSIS — Z12.31 VISIT FOR SCREENING MAMMOGRAM: Primary | ICD-10-CM

## 2023-05-23 ENCOUNTER — HOSPITAL ENCOUNTER (OUTPATIENT)
Dept: MAMMOGRAPHY | Facility: HOSPITAL | Age: 78
Discharge: HOME | End: 2023-05-23
Admitting: INTERNAL MEDICINE
Payer: MEDICARE

## 2023-05-23 DIAGNOSIS — Z12.31 SCREENING MAMMOGRAM FOR HIGH-RISK PATIENT: ICD-10-CM

## 2023-05-23 PROCEDURE — 77063 BREAST TOMOSYNTHESIS BI: CPT

## 2023-05-23 PROCEDURE — 77067 SCR MAMMO BI INCL CAD: CPT

## 2023-06-13 DIAGNOSIS — K21.9 GASTROESOPHAGEAL REFLUX DISEASE WITHOUT ESOPHAGITIS: ICD-10-CM

## 2023-06-13 DIAGNOSIS — R21 RASH: ICD-10-CM

## 2023-06-13 RX ORDER — FAMOTIDINE 40 MG/1
40 TABLET, FILM COATED ORAL DAILY
Qty: 30 TABLET | Refills: 5 | Status: SHIPPED | OUTPATIENT
Start: 2023-06-13

## 2023-08-07 ENCOUNTER — OFFICE VISIT (OUTPATIENT)
Dept: INTERNAL MEDICINE | Facility: CLINIC | Age: 78
End: 2023-08-07
Payer: MEDICARE

## 2023-08-07 VITALS
BODY MASS INDEX: 22.16 KG/M2 | OXYGEN SATURATION: 93 % | SYSTOLIC BLOOD PRESSURE: 96 MMHG | WEIGHT: 129.8 LBS | TEMPERATURE: 98 F | DIASTOLIC BLOOD PRESSURE: 60 MMHG | HEIGHT: 64 IN | HEART RATE: 89 BPM

## 2023-08-07 DIAGNOSIS — N32.81 OAB (OVERACTIVE BLADDER): Primary | ICD-10-CM

## 2023-08-07 DIAGNOSIS — F32.89 OTHER DEPRESSION: ICD-10-CM

## 2023-08-07 DIAGNOSIS — R94.31 ABNORMAL EKG: ICD-10-CM

## 2023-08-07 DIAGNOSIS — E78.5 DYSLIPIDEMIA: ICD-10-CM

## 2023-08-07 DIAGNOSIS — R41.3 MEMORY LOSS: ICD-10-CM

## 2023-08-07 DIAGNOSIS — J30.89 SEASONAL ALLERGIC RHINITIS DUE TO OTHER ALLERGIC TRIGGER: ICD-10-CM

## 2023-08-07 DIAGNOSIS — Z12.11 SCREENING FOR COLON CANCER: ICD-10-CM

## 2023-08-07 PROBLEM — F32.A DEPRESSION: Status: ACTIVE | Noted: 2023-08-07

## 2023-08-07 PROCEDURE — 93000 ELECTROCARDIOGRAM COMPLETE: CPT | Performed by: INTERNAL MEDICINE

## 2023-08-07 PROCEDURE — 99214 OFFICE O/P EST MOD 30 MIN: CPT | Performed by: INTERNAL MEDICINE

## 2023-08-07 RX ORDER — FLUTICASONE PROPIONATE 50 MCG
2 SPRAY, SUSPENSION (ML) NASAL DAILY
COMMUNITY
End: 2023-08-07 | Stop reason: SDUPTHER

## 2023-08-07 RX ORDER — FLUTICASONE PROPIONATE 50 MCG
2 SPRAY, SUSPENSION (ML) NASAL DAILY
Qty: 16 G | Refills: 4 | Status: SHIPPED | OUTPATIENT
Start: 2023-08-07

## 2023-08-07 NOTE — PROGRESS NOTES
Heartburn, Dyslipidemia, pelvic floor pain (When sitting.  had a vaginal surgery 6/1/23-has fu in 2 days), Abnormal ECG (Prior to surgery), weight gain in stomach, Memory Loss (Is thinking about new medication), and Fatigue (With 7 hrs sleep at night)    Subjective   Maura Barry is a 78 y.o. female is here today for follow-up.    History of Present Illness  S/p scar tissue surgery by Dr. Wooten, and has not helped so much.  Had an EKG for preop- June 1st , read as Lt atrial enlargement.  Stays tired.  No h/o htn or soa.  Memory s worse and was recommended the new biologic alzheimers drug. WIll need a PET scan for further eval.  Needs refills on the flonase.  Notes a lot of fatigue. Initially tired when she wakes up , but gets better , walks a few miles with friends, but by afternoon has an overwhelming fatigue.  No energy, and mentally cannot motivate herself. Drinking a lot of fluids.  Incontinence.      Answers submitted by the patient for this visit:  Other (Submitted on 7/31/2023)  Please describe your symptoms.: This is a routine 6 month checkup. However, I am concerned about the results of my EKG performed in early June. It says I have left atrial enlargement. The diagnosis is borderline abnormal.  Have you had these symptoms before?: No  How long have you been having these symptoms?: Greater than 2 weeks  Please list any medications you are currently taking for this condition.: None  Please describe any probable cause for these symptoms. : No idea  Primary Reason for Visit (Submitted on 7/31/2023)  What is the primary reason for your visit?: Other        Current Outpatient Medications:     ALLERGY SERUM INJECTION, Inject  under the skin 1 (One) Time Per Week., Disp: , Rfl:     Ascorbic Acid (VITAMIN C) 500 MG capsule, Take 1,000 mg by mouth Daily., Disp: , Rfl:     buPROPion XL (WELLBUTRIN XL) 300 MG 24 hr tablet, Take 1 tablet by mouth Daily., Disp: , Rfl: 2    Cholecalciferol (VITAMIN D3) 5000 units  capsule capsule, Take 1 capsule by mouth Daily., Disp: , Rfl:     Cyanocobalamin 1000 MCG sublingual tablet, Place 1 tablet under the tongue Daily., Disp: 30 each, Rfl: 5    desvenlafaxine (PRISTIQ) 50 MG 24 hr tablet, , Disp: , Rfl:     estradiol (ESTRACE) 0.1 MG/GM vaginal cream, INSERT ONE GRAM VAGINALLY TWO TO THREE TIMES PER WEEK AS DIRECTED, Disp: , Rfl:     famotidine (PEPCID) 40 MG tablet, TAKE 1 TABLET BY MOUTH DAILY, Disp: 30 tablet, Rfl: 5    fluorouracil (EFUDEX) 5 % cream, , Disp: , Rfl:     fluticasone (FLONASE) 50 MCG/ACT nasal spray, 2 sprays into the nostril(s) as directed by provider Daily., Disp: 16 g, Rfl: 4    Glucosamine HCl 1000 MG tablet, Take 2,000 mg by mouth Daily., Disp: , Rfl:     magnesium oxide (MAGOX) 400 (241.3 Mg) MG tablet tablet, Take 1 tablet by mouth Daily., Disp: , Rfl:     memantine (NAMENDA XR) 28 MG capsule sustained-release 24 hr extended release capsule, , Disp: , Rfl:     montelukast (Singulair) 10 MG tablet, One PO daily, Disp: 90 tablet, Rfl: 1    pantoprazole (PROTONIX) 40 MG EC tablet, Take 1 tablet by mouth Daily., Disp: 90 tablet, Rfl: 3    traZODone (DESYREL) 100 MG tablet, Take 1 tablet by mouth every night at bedtime., Disp: , Rfl:     triamcinolone (KENALOG) 0.025 % cream, Apply 1 application topically to the appropriate area as directed 2 (Two) Times a Day., Disp: 80 g, Rfl: 1    Wal-phed PE 10 MG tablet, Take 1 tablet by mouth Every 12 (Twelve) Hours., Disp: , Rfl:     Mirabegron ER (Myrbetriq) 25 MG tablet sustained-release 24 hour 24 hr tablet, Take 1 tablet by mouth Daily., Disp: 30 tablet, Rfl: 0    zoledronic acid (RECLAST) 5 MG/100ML solution infusion, Infuse 100 mL into a venous catheter 1 (One) Time for 1 dose., Disp: 100 mL, Rfl: 0      The following portions of the patient's history were reviewed and updated as appropriate: allergies, current medications, past family history, past medical history, past social history, past surgical history and  "problem list.    Review of Systems   Constitutional:  Positive for fatigue. Negative for chills and fever.   HENT:  Negative for ear discharge, ear pain, sinus pressure and sore throat.    Respiratory:  Negative for cough, chest tightness and shortness of breath.    Cardiovascular:  Negative for chest pain, palpitations and leg swelling.   Gastrointestinal:  Negative for diarrhea, nausea and vomiting.   Genitourinary:  Positive for frequency and urgency.   Musculoskeletal:  Negative for arthralgias, back pain and myalgias.   Neurological:  Negative for dizziness, syncope and headaches.   Psychiatric/Behavioral:  Positive for confusion and dysphoric mood. Negative for sleep disturbance.      Objective   BP 96/60   Pulse 89   Temp 98 øF (36.7 øC)   Ht 162.6 cm (64\")   Wt 58.9 kg (129 lb 12.8 oz)   SpO2 93% Comment: ra  BMI 22.28 kg/mý   Physical Exam  Vitals and nursing note reviewed.   Constitutional:       Appearance: She is well-developed.   HENT:      Head: Normocephalic and atraumatic.      Right Ear: External ear normal.      Left Ear: External ear normal.      Mouth/Throat:      Pharynx: No oropharyngeal exudate.   Eyes:      Conjunctiva/sclera: Conjunctivae normal.      Pupils: Pupils are equal, round, and reactive to light.   Neck:      Thyroid: No thyromegaly.   Cardiovascular:      Rate and Rhythm: Normal rate and regular rhythm.      Pulses: Normal pulses.      Heart sounds: Normal heart sounds. No murmur heard.    No friction rub. No gallop.   Pulmonary:      Effort: Pulmonary effort is normal.      Breath sounds: Normal breath sounds.   Abdominal:      General: Bowel sounds are normal. There is distension (moderate).      Palpations: Abdomen is soft.      Tenderness: There is abdominal tenderness (llq- minimal).   Musculoskeletal:      Cervical back: Neck supple.   Skin:     General: Skin is warm and dry.   Neurological:      Mental Status: She is alert and oriented to person, place, and time.      " Cranial Nerves: No cranial nerve deficit.   Psychiatric:         Judgment: Judgment normal.         Results for orders placed or performed during the hospital encounter of 04/02/23   Covid-19 + Flu A&B AG, Veritor [id number]    Specimen: Swab   Result Value Ref Range    SARS Antigen Not Detected Not Detected, Presumptive Negative    Influenza A Antigen JASON Not Detected Not Detected    Influenza B Antigen JASON Not Detected Not Detected    Internal Control Passed Passed    Lot Number [id number]     Expiration Date 11,052,023          ECG 12 Lead    Date/Time: 8/7/2023 12:16 PM  Performed by: Bronwyn Bird MD  Authorized by: Bronwyn Bird MD   Comparison: not compared with previous ECG   Previous ECG: no previous ECG available  Rhythm: sinus rhythm  Rate: normal  Conduction: conduction normal  ST Segments: ST segments normal  T Waves: T waves normal  QRS axis: normal  Other: no other findings    Clinical impression: normal ECG           Assessment & Plan   Diagnoses and all orders for this visit:    OAB (overactive bladder)  -     Mirabegron ER (Myrbetriq) 25 MG tablet sustained-release 24 hour 24 hr tablet; Take 1 tablet by mouth Daily.    Abnormal EKG    Memory loss  Comments:  followed by Dr. Cuevas    Dyslipidemia  -     Comprehensive Metabolic Panel; Future  -     Lipid Panel; Future  -     TSH Rfx On Abnormal To Free T4; Future    Screening for colon cancer  -     Ambulatory Referral For Screening Colonoscopy    Seasonal allergic rhinitis due to other allergic trigger  -     fluticasone (FLONASE) 50 MCG/ACT nasal spray; 2 sprays into the nostril(s) as directed by provider Daily.    Other depression  Comments:  discuss with , suggested increasing pristiq or trintellix  Orders:  -     TSH Rfx On Abnormal To Free T4; Future  -     Vitamin B12; Future    Other orders  -     Discontinue: fluticasone (FLONASE) 50 MCG/ACT nasal spray; 2 sprays into the nostril(s) as directed by provider Daily.  -     ECG 12  Lead      Hydrate well with a lot of water           No follow-ups on file.    Electronically signed by:    Bronwyn Bird MD

## 2023-08-10 ENCOUNTER — LAB (OUTPATIENT)
Dept: LAB | Facility: HOSPITAL | Age: 78
End: 2023-08-10
Payer: MEDICARE

## 2023-08-10 DIAGNOSIS — F32.89 OTHER DEPRESSION: ICD-10-CM

## 2023-08-10 DIAGNOSIS — E78.5 DYSLIPIDEMIA: ICD-10-CM

## 2023-08-10 LAB
ALBUMIN SERPL-MCNC: 4.2 G/DL (ref 3.5–5.2)
ALBUMIN/GLOB SERPL: 1.9 G/DL
ALP SERPL-CCNC: 77 U/L (ref 39–117)
ALT SERPL-CCNC: 19 U/L (ref 1–33)
AST SERPL-CCNC: 20 U/L (ref 1–32)
BILIRUB SERPL-MCNC: 0.2 MG/DL (ref 0–1.2)
BUN SERPL-MCNC: 20 MG/DL (ref 8–23)
BUN/CREAT SERPL: 22.2 (ref 7–25)
CALCIUM SERPL-MCNC: 9.7 MG/DL (ref 8.6–10.5)
CHLORIDE SERPL-SCNC: 103 MMOL/L (ref 98–107)
CHOLEST SERPL-MCNC: 230 MG/DL (ref 0–200)
CO2 SERPL-SCNC: 29.2 MMOL/L (ref 22–29)
CREAT SERPL-MCNC: 0.9 MG/DL (ref 0.57–1)
EGFRCR SERPLBLD CKD-EPI 2021: 66.4 ML/MIN/1.73
GLOBULIN SER CALC-MCNC: 2.2 GM/DL
GLUCOSE SERPL-MCNC: 90 MG/DL (ref 65–99)
HDLC SERPL-MCNC: 76 MG/DL (ref 40–60)
LDLC SERPL CALC-MCNC: 145 MG/DL (ref 0–100)
POTASSIUM SERPL-SCNC: 4.9 MMOL/L (ref 3.5–5.2)
PROT SERPL-MCNC: 6.4 G/DL (ref 6–8.5)
SODIUM SERPL-SCNC: 140 MMOL/L (ref 136–145)
TRIGL SERPL-MCNC: 51 MG/DL (ref 0–150)
TSH SERPL DL<=0.005 MIU/L-ACNC: 4.04 UIU/ML (ref 0.27–4.2)
VIT B12 SERPL-MCNC: >2000 PG/ML (ref 211–946)
VLDLC SERPL CALC-MCNC: 9 MG/DL (ref 5–40)

## 2023-09-27 ENCOUNTER — PATIENT MESSAGE (OUTPATIENT)
Dept: INTERNAL MEDICINE | Facility: CLINIC | Age: 78
End: 2023-09-27
Payer: MEDICARE

## 2023-09-28 RX ORDER — TRIAMCINOLONE ACETONIDE 0.25 MG/G
1 CREAM TOPICAL 2 TIMES DAILY
Qty: 80 G | Refills: 1 | Status: SHIPPED | OUTPATIENT
Start: 2023-09-28

## 2023-09-28 NOTE — TELEPHONE ENCOUNTER
"Aspen Antony MA 9/28/2023 8:14 AM EDT      ----- Message -----  From: Maura Barry \"Dorota\"  Sent: 9/27/2023 6:47 PM EDT  To: [facility]  Subject: Rx for Fluorouracil Cream USP 5%     Dr. MARTINEZ, so sorry, I actually asked for the wrong med. I really need Triamcinolone Acetonide Cream USP. .025%. As I understand it’s a stronger type of cortisone.     "

## 2023-10-17 PROCEDURE — 87077 CULTURE AEROBIC IDENTIFY: CPT

## 2023-10-17 PROCEDURE — 87186 SC STD MICRODIL/AGAR DIL: CPT

## 2023-10-17 PROCEDURE — 87086 URINE CULTURE/COLONY COUNT: CPT

## 2023-11-02 ENCOUNTER — PATIENT MESSAGE (OUTPATIENT)
Dept: INTERNAL MEDICINE | Facility: CLINIC | Age: 78
End: 2023-11-02
Payer: MEDICARE

## 2023-11-02 RX ORDER — OXYBUTYNIN CHLORIDE 10 MG/1
10 TABLET, EXTENDED RELEASE ORAL DAILY
Qty: 90 TABLET | Refills: 1 | Status: SHIPPED | OUTPATIENT
Start: 2023-11-02

## 2023-11-02 NOTE — TELEPHONE ENCOUNTER
From: Maura Barry  To: Bronwyn Bird  Sent: 11/2/2023 9:40 AM EDT  Subject: Alternative to Myrbetriq    Jacy MARTINEZ, I just received a flyer from [employer] saying that there are much cheaper generic drugs than Myrbetriq. I’m wondering if you would be able to prescribe one of these and have it be just as good as the Myrbetriq, which works wonderfully. The first one is oxybutynin chloride ER 24 hour release. The second one is oxybutynin chloride tablet., Not ER. The third one is tolterodine ER and the fourth one is Solifacin. I’m hoping one of these works well because the cost for a month on all of them is under six dollars. I currently pay $119 a month for Myrbetriq. thanks, Dorota Barry.

## 2023-11-27 ENCOUNTER — PATIENT MESSAGE (OUTPATIENT)
Dept: INTERNAL MEDICINE | Facility: CLINIC | Age: 78
End: 2023-11-27
Payer: MEDICARE

## 2023-11-27 RX ORDER — ESTRADIOL 0.1 MG/G
CREAM VAGINAL
Qty: 42 G | Refills: 2 | Status: SHIPPED | OUTPATIENT
Start: 2023-11-27

## 2023-11-27 NOTE — TELEPHONE ENCOUNTER
From: Maura Barry  To: Bronwyn Bird  Sent: 11/27/2023 7:18 AM EST  Subject: Estradiol .01% vaginal cream 42.5gm    Hi Dr. MARTINEZ, I’m close to running out of this med. It was last prescribed by urologist Maryanne PAREDES. I no longer see her. would you please send over a script to [facility]. I believe you have prescribed it for me in the past. Thank you, Dorota HARMAN

## 2023-12-29 ENCOUNTER — TELEPHONE (OUTPATIENT)
Dept: INTERNAL MEDICINE | Facility: CLINIC | Age: 78
End: 2023-12-29
Payer: MEDICARE

## 2023-12-29 ENCOUNTER — PATIENT MESSAGE (OUTPATIENT)
Dept: INTERNAL MEDICINE | Facility: CLINIC | Age: 78
End: 2023-12-29
Payer: MEDICARE

## 2023-12-29 DIAGNOSIS — M62.89 PELVIC FLOOR DYSFUNCTION: ICD-10-CM

## 2023-12-29 DIAGNOSIS — N32.81 OAB (OVERACTIVE BLADDER): Primary | ICD-10-CM

## 2023-12-29 NOTE — TELEPHONE ENCOUNTER
"----- Message from Mariana Morgan MA sent at 12/29/2023  9:27 AM EST -----  Regarding: FW: New pelvic floor, physical therapist referral  Contact: [phone]    ----- Message -----  From: Maura Barry \"Dorota\"  Sent: 12/29/2023   9:19 AM EST  To: Gurjit Thurston [facility]  Subject: New pelvic floor, physical therapist referral    Dr. LESLIE, I am so sorry to bother you again so soon. Vinod Mazariegos PT is no longer practicing and has referred me to another pelvic floor physical therapist. The practice is [facility] on [address]. Their fax number is [phone]. The PT who runs the clinic is actually a person who has done a three day clinic for other pelvic floor physical therapists. Vinod attended this and said she was excellent and that he learned a lot. I hope this helps me because all of the great work that he and [facility] have done has not had much positive effect. Could you please fax a referral to [facility] prior to my appt on January 4th. ALHAJI davila. I hope this request has not come too late.     "

## 2024-01-23 ENCOUNTER — LAB (OUTPATIENT)
Dept: LAB | Facility: HOSPITAL | Age: 79
End: 2024-01-23
Payer: MEDICARE

## 2024-01-23 DIAGNOSIS — K21.9 GASTROESOPHAGEAL REFLUX DISEASE WITHOUT ESOPHAGITIS: ICD-10-CM

## 2024-01-23 DIAGNOSIS — R73.09 ELEVATED GLUCOSE: ICD-10-CM

## 2024-01-23 DIAGNOSIS — E78.5 DYSLIPIDEMIA: ICD-10-CM

## 2024-01-23 DIAGNOSIS — R21 RASH: ICD-10-CM

## 2024-01-23 DIAGNOSIS — E55.9 VITAMIN D DEFICIENCY: ICD-10-CM

## 2024-01-23 DIAGNOSIS — E53.8 B12 DEFICIENCY: ICD-10-CM

## 2024-01-23 LAB
25(OH)D3 SERPL-MCNC: 46.6 NG/ML (ref 30–100)
ALBUMIN SERPL-MCNC: 4.4 G/DL (ref 3.5–5.2)
ALBUMIN/GLOB SERPL: 1.8 G/DL
ALP SERPL-CCNC: 76 U/L (ref 39–117)
ALT SERPL W P-5'-P-CCNC: 17 U/L (ref 1–33)
ANION GAP SERPL CALCULATED.3IONS-SCNC: 9.4 MMOL/L (ref 5–15)
AST SERPL-CCNC: 25 U/L (ref 1–32)
BILIRUB SERPL-MCNC: 0.3 MG/DL (ref 0–1.2)
BUN SERPL-MCNC: 20 MG/DL (ref 8–23)
BUN/CREAT SERPL: 23.8 (ref 7–25)
CALCIUM SPEC-SCNC: 9.1 MG/DL (ref 8.6–10.5)
CHLORIDE SERPL-SCNC: 103 MMOL/L (ref 98–107)
CHOLEST SERPL-MCNC: 214 MG/DL (ref 0–200)
CO2 SERPL-SCNC: 28.6 MMOL/L (ref 22–29)
CREAT SERPL-MCNC: 0.84 MG/DL (ref 0.57–1)
DEPRECATED RDW RBC AUTO: 42.6 FL (ref 37–54)
EGFRCR SERPLBLD CKD-EPI 2021: 72.1 ML/MIN/1.73
ERYTHROCYTE [DISTWIDTH] IN BLOOD BY AUTOMATED COUNT: 12.2 % (ref 12.3–15.4)
GLOBULIN UR ELPH-MCNC: 2.4 GM/DL
GLUCOSE SERPL-MCNC: 88 MG/DL (ref 65–99)
HBA1C MFR BLD: 5.5 % (ref 4.8–5.6)
HCT VFR BLD AUTO: 38.3 % (ref 34–46.6)
HDLC SERPL-MCNC: 70 MG/DL (ref 40–60)
HGB BLD-MCNC: 12.8 G/DL (ref 12–15.9)
LDLC SERPL CALC-MCNC: 133 MG/DL (ref 0–100)
LDLC/HDLC SERPL: 1.88 {RATIO}
MCH RBC QN AUTO: 32.1 PG (ref 26.6–33)
MCHC RBC AUTO-ENTMCNC: 33.4 G/DL (ref 31.5–35.7)
MCV RBC AUTO: 96 FL (ref 79–97)
PLATELET # BLD AUTO: 259 10*3/MM3 (ref 140–450)
PMV BLD AUTO: 11 FL (ref 6–12)
POTASSIUM SERPL-SCNC: 4.4 MMOL/L (ref 3.5–5.2)
PROT SERPL-MCNC: 6.8 G/DL (ref 6–8.5)
RBC # BLD AUTO: 3.99 10*6/MM3 (ref 3.77–5.28)
SODIUM SERPL-SCNC: 141 MMOL/L (ref 136–145)
TRIGL SERPL-MCNC: 63 MG/DL (ref 0–150)
TSH SERPL DL<=0.05 MIU/L-ACNC: 2.82 UIU/ML (ref 0.27–4.2)
VIT B12 BLD-MCNC: 1922 PG/ML (ref 211–946)
VLDLC SERPL-MCNC: 11 MG/DL (ref 5–40)
WBC NRBC COR # BLD AUTO: 5.92 10*3/MM3 (ref 3.4–10.8)

## 2024-01-23 PROCEDURE — 80053 COMPREHEN METABOLIC PANEL: CPT

## 2024-01-23 PROCEDURE — 82306 VITAMIN D 25 HYDROXY: CPT

## 2024-01-23 PROCEDURE — 82607 VITAMIN B-12: CPT

## 2024-01-23 PROCEDURE — 84443 ASSAY THYROID STIM HORMONE: CPT

## 2024-01-23 PROCEDURE — 83036 HEMOGLOBIN GLYCOSYLATED A1C: CPT

## 2024-01-23 PROCEDURE — 85027 COMPLETE CBC AUTOMATED: CPT

## 2024-01-23 PROCEDURE — 80061 LIPID PANEL: CPT

## 2024-01-23 RX ORDER — FAMOTIDINE 40 MG/1
40 TABLET, FILM COATED ORAL DAILY
Qty: 90 TABLET | OUTPATIENT
Start: 2024-01-23

## 2024-01-23 RX ORDER — FAMOTIDINE 40 MG/1
40 TABLET, FILM COATED ORAL DAILY
Qty: 30 TABLET | Refills: 0 | Status: SHIPPED | OUTPATIENT
Start: 2024-01-23

## 2024-01-26 ENCOUNTER — OFFICE VISIT (OUTPATIENT)
Dept: INTERNAL MEDICINE | Facility: CLINIC | Age: 79
End: 2024-01-26
Payer: MEDICARE

## 2024-01-26 VITALS
SYSTOLIC BLOOD PRESSURE: 112 MMHG | WEIGHT: 129 LBS | HEIGHT: 64 IN | BODY MASS INDEX: 22.02 KG/M2 | HEART RATE: 89 BPM | OXYGEN SATURATION: 96 % | DIASTOLIC BLOOD PRESSURE: 68 MMHG

## 2024-01-26 DIAGNOSIS — E53.8 B12 DEFICIENCY: ICD-10-CM

## 2024-01-26 DIAGNOSIS — E55.9 VITAMIN D DEFICIENCY: ICD-10-CM

## 2024-01-26 DIAGNOSIS — K21.9 GASTROESOPHAGEAL REFLUX DISEASE WITHOUT ESOPHAGITIS: ICD-10-CM

## 2024-01-26 DIAGNOSIS — E78.5 DYSLIPIDEMIA: ICD-10-CM

## 2024-01-26 DIAGNOSIS — M81.0 MENOPAUSAL OSTEOPOROSIS: ICD-10-CM
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
## 2024-01-26 DIAGNOSIS — R73.09 ELEVATED GLUCOSE: ICD-10-CM

## 2024-01-26 DIAGNOSIS — Z00.00 MEDICARE ANNUAL WELLNESS VISIT, SUBSEQUENT: Primary | ICD-10-CM

## 2024-01-26 RX ORDER — ZOLEDRONIC ACID 5 MG/100ML
5 INJECTION, SOLUTION INTRAVENOUS ONCE
Start: 2024-03-05 | End: 2024-03-05

## 2024-01-26 NOTE — PROGRESS NOTES
The ABCs of the Annual Wellness Visit  Subsequent Medicare Wellness Visit    Subjective    Maura Barry is a 78 y.o. female who presents for a Subsequent Medicare Wellness Visit.    The following portions of the patient's history were reviewed and   updated as appropriate: allergies, current medications, past family history, past medical history, past social history, past surgical history, and problem list.    Compared to one year ago, the patient feels her physical   health is better.    Compared to one year ago, the patient feels her mental   health is the same.    Recent Hospitalizations:  She was not admitted to the hospital during the last year.       Current Medical Providers:  Patient Care Team:  Bronwyn Bird MD as PCP - General (Internal Medicine)  Grace Bell MD as Consulting Physician (Dermatology)  Sabine Cuevas MD (Neurology)  Norberto Oshea MD as Consulting Physician (Pain Medicine)    Outpatient Medications Prior to Visit   Medication Sig Dispense Refill    ALLERGY SERUM INJECTION Inject  under the skin 1 (One) Time Per Week.      Ascorbic Acid (VITAMIN C) 500 MG capsule Take 1,000 mg by mouth Daily.      buPROPion XL (WELLBUTRIN XL) 300 MG 24 hr tablet Take 1 tablet by mouth Daily.  2    Cholecalciferol (VITAMIN D3) 5000 units capsule capsule Take 1 capsule by mouth Daily.      Cyanocobalamin 1000 MCG sublingual tablet Place 1 tablet under the tongue Daily. 30 each 5    desvenlafaxine (PRISTIQ) 50 MG 24 hr tablet       doxycycline (VIBRAMYCIN) 100 MG capsule Take 1 capsule by mouth 2 (Two) Times a Day for 7 days. 14 capsule 0    estradiol (ESTRACE) 0.1 MG/GM vaginal cream INSERT ONE GRAM VAGINALLY TWO TO THREE TIMES PER WEEK AS DIRECTED 42 g 2    famotidine (PEPCID) 40 MG tablet TAKE 1 TABLET BY MOUTH DAILY 30 tablet 0    fluticasone (FLONASE) 50 MCG/ACT nasal spray 2 sprays into the nostril(s) as directed by provider Daily. 16 g 4    Glucosamine HCl 1000 MG tablet  Take 2,000 mg by mouth Daily.      magnesium oxide (MAGOX) 400 (241.3 Mg) MG tablet tablet Take 1 tablet by mouth Daily.      memantine (NAMENDA XR) 28 MG capsule sustained-release 24 hr extended release capsule       Mirabegron ER (Myrbetriq) 25 MG tablet sustained-release 24 hour 24 hr tablet Take 1 tablet by mouth Daily. 30 tablet 0    montelukast (Singulair) 10 MG tablet One PO daily 90 tablet 1    pantoprazole (PROTONIX) 40 MG EC tablet Take 1 tablet by mouth Daily. 90 tablet 3    traZODone (DESYREL) 100 MG tablet Take 1 tablet by mouth every night at bedtime.      triamcinolone (KENALOG) 0.025 % cream Apply 1 application  topically to the appropriate area as directed 2 (Two) Times a Day. 80 g 1    Wal-phed PE 10 MG tablet Take 1 tablet by mouth Every 12 (Twelve) Hours.      Desvenlafaxine Succinate ER 25 MG tablet sustained-release 24 hour TAKE ONE TABLET BY MOUTH DAILY WITH 50MG DOSE FOR A TOTAL OF 75MG DAILY (Patient not taking: Reported on 1/26/2024)      fluorouracil (EFUDEX) 5 % cream  (Patient not taking: Reported on 1/26/2024)      oxybutynin XL (Ditropan XL) 10 MG 24 hr tablet Take 1 tablet by mouth Daily. Replaces myrbetriq (Patient not taking: Reported on 1/26/2024) 90 tablet 1    zoledronic acid (RECLAST) 5 MG/100ML solution infusion Infuse 100 mL into a venous catheter 1 (One) Time for 1 dose. 100 mL 0     No facility-administered medications prior to visit.       No opioid medication identified on active medication list. I have reviewed chart for other potential  high risk medication/s and harmful drug interactions in the elderly.        Aspirin is not on active medication list.  Aspirin use is not indicated based on review of current medical condition/s. Risk of harm outweighs potential benefits.  .    Patient Active Problem List   Diagnosis    KRISTY (obstructive sleep apnea)    B12 deficiency    Iron deficiency    Dyslipidemia    Other fatigue    Menopausal osteoporosis    OAB (overactive bladder)  "   Memory loss    Depression     Advance Care Planning   Advance Care Planning     Advance Directive is on file.  ACP discussion was held with the patient during this visit. Patient has an advance directive in EMR which is still valid.      Objective    Vitals:    24 0852   BP: 112/68   Pulse: 89   SpO2: 96%   Weight: 58.5 kg (129 lb)   Height: 162.6 cm (64\")   PainSc: 0-No pain     Estimated body mass index is 22.14 kg/m² as calculated from the following:    Height as of this encounter: 162.6 cm (64\").    Weight as of this encounter: 58.5 kg (129 lb).    BMI is within normal parameters. No other follow-up for BMI required.      Does the patient have evidence of cognitive impairment? No    Lab Results   Component Value Date    TRIG 63 2024    HDL 70 (H) 2024     (H) 2024    VLDL 11 2024    HGBA1C 5.50 2024        HEALTH RISK ASSESSMENT    Smoking Status:  Social History     Tobacco Use   Smoking Status Never   Smokeless Tobacco Never     Alcohol Consumption:  Social History     Substance and Sexual Activity   Alcohol Use Yes    Alcohol/week: 1.0 standard drink of alcohol    Types: 1 Drinks containing 0.5 oz of alcohol per week    Comment: 1 or less a week     Fall Risk Screen:    STEADI Fall Risk Assessment was completed, and patient is at LOW risk for falls.Assessment completed on:2024    Depression Screenin/26/2024     9:03 AM   PHQ-2/PHQ-9 Depression Screening   Little Interest or Pleasure in Doing Things 0-->not at all   Feeling Down, Depressed or Hopeless 0-->not at all   PHQ-9: Brief Depression Severity Measure Score 0       Health Habits and Functional and Cognitive Screenin/26/2024     9:04 AM   Functional & Cognitive Status   Do you have difficulty preparing food and eating? No   Do you have difficulty bathing yourself, getting dressed or grooming yourself? No   Do you have difficulty using the toilet? No   Do you have difficulty moving " around from place to place? No   Do you have trouble with steps or getting out of a bed or a chair? No   Current Diet Well Balanced Diet   Dental Exam Up to date   Eye Exam Up to date   Exercise (times per week) 7 times per week   Current Exercises Include Pilates;Walking   Do you need help using the phone?  No   Are you deaf or do you have serious difficulty hearing?  No   Do you need help to go to places out of walking distance? No   Do you need help shopping? No   Do you need help preparing meals?  No   Do you need help with housework?  No   Do you need help with laundry? No   Do you need help taking your medications? No   Do you need help managing money? No   Do you ever drive or ride in a car without wearing a seat belt? No   Have you felt unusual stress, anger or loneliness in the last month? No   Who do you live with? Alone   If you need help, do you have trouble finding someone available to you? No   Do you have difficulty concentrating, remembering or making decisions? No       Age-appropriate Screening Schedule:  Refer to the list below for future screening recommendations based on patient's age, sex and/or medical conditions. Orders for these recommended tests are listed in the plan section. The patient has been provided with a written plan.    Health Maintenance   Topic Date Due    COLORECTAL CANCER SCREENING  07/10/2024    LIPID PANEL  01/23/2025    ANNUAL WELLNESS VISIT  01/26/2025    DXA SCAN  02/28/2025    TDAP/TD VACCINES (5 - Td or Tdap) 04/02/2033    HEPATITIS C SCREENING  Completed    COVID-19 Vaccine  Completed    INFLUENZA VACCINE  Completed    Pneumococcal Vaccine 65+  Completed    ZOSTER VACCINE  Completed                  CMS Preventative Services Quick Reference  Risk Factors Identified During Encounter  Depression/Dysphoria: Current medication is effective, no change recommended  Immunizations Discussed/Encouraged: COVID19 and RSV (Respiratory Syncytial Virus)  The above risks/problems  "have been discussed with the patient.  Pertinent information has been shared with the patient in the After Visit Summary.  An After Visit Summary and PPPS were made available to the patient.    Follow Up:   Next Medicare Wellness visit to be scheduled in 1 year.       Additional E&M Note during same encounter follows:  Patient has multiple medical problems which are significant and separately identifiable that require additional work above and beyond the Medicare Wellness Visit.      Chief Complaint  Medicare Wellness-subsequent    Subjective        HPI  Maura Barry is also being seen today for a follow up on her htn, hlp.   Notes her alzheimers is stable. On the namenda. She was told she may be a candidate for the new drug. Needs a ? Encephalogram/ myelogram/ amyloid scan,  Seen by Dr. Guillen and plan for a ? Microdiskectomy.  Has a new puppy and multiple bites, on doxy, and utd on tetanus.  Vinod retired, and is now seeing another PT for her Pelvic floor.  Taking Pilates, working on her core strength.  For depression, Ulises is treating her with Magnets. Helping her significantly.  Diet is good, but does eat out. Works out, walks , also goes to the Gym.     Colonoscopy is scheduled in Feb 9th.  Objective   Vital Signs:  /68   Pulse 89   Ht 162.6 cm (64\")   Wt 58.5 kg (129 lb)   SpO2 96%   BMI 22.14 kg/m²     Physical Exam  Constitutional:       General: She is not in acute distress.     Appearance: Normal appearance.   HENT:      Head: Normocephalic and atraumatic.      Right Ear: Tympanic membrane and external ear normal.      Left Ear: Tympanic membrane and external ear normal.      Nose: Nose normal.      Mouth/Throat:      Mouth: Mucous membranes are moist.   Eyes:      General: No scleral icterus.  Neck:      Vascular: No carotid bruit.   Cardiovascular:      Rate and Rhythm: Normal rate and regular rhythm.      Pulses: Normal pulses.      Heart sounds: Normal heart sounds. No murmur heard.     " No friction rub. No gallop.   Pulmonary:      Effort: Pulmonary effort is normal.      Breath sounds: Normal breath sounds. No rhonchi or rales.   Abdominal:      General: Bowel sounds are normal. There is no distension.      Palpations: Abdomen is soft.      Tenderness: There is no right CVA tenderness, left CVA tenderness, guarding or rebound.      Hernia: No hernia is present.   Musculoskeletal:         General: No tenderness. Normal range of motion.      Cervical back: Normal range of motion.      Right lower leg: No edema.      Left lower leg: No edema.   Lymphadenopathy:      Cervical: No cervical adenopathy.   Skin:     General: Skin is warm.      Findings: No rash.   Neurological:      General: No focal deficit present.      Mental Status: She is alert and oriented to person, place, and time. Mental status is at baseline.      Cranial Nerves: No cranial nerve deficit.      Sensory: No sensory deficit.      Coordination: Coordination normal.      Gait: Gait normal.      Deep Tendon Reflexes: Reflexes normal.   Psychiatric:         Mood and Affect: Mood normal.         Behavior: Behavior normal.                               Current Outpatient Medications:     ALLERGY SERUM INJECTION, Inject  under the skin 1 (One) Time Per Week., Disp: , Rfl:     Ascorbic Acid (VITAMIN C) 500 MG capsule, Take 1,000 mg by mouth Daily., Disp: , Rfl:     buPROPion XL (WELLBUTRIN XL) 300 MG 24 hr tablet, Take 1 tablet by mouth Daily., Disp: , Rfl: 2    Cholecalciferol (VITAMIN D3) 5000 units capsule capsule, Take 1 capsule by mouth Daily., Disp: , Rfl:     Cyanocobalamin 1000 MCG sublingual tablet, Place 1 tablet under the tongue Daily., Disp: 30 each, Rfl: 5    desvenlafaxine (PRISTIQ) 50 MG 24 hr tablet, , Disp: , Rfl:     doxycycline (VIBRAMYCIN) 100 MG capsule, Take 1 capsule by mouth 2 (Two) Times a Day for 7 days., Disp: 14 capsule, Rfl: 0    estradiol (ESTRACE) 0.1 MG/GM vaginal cream, INSERT ONE GRAM VAGINALLY TWO TO  "THREE TIMES PER WEEK AS DIRECTED, Disp: 42 g, Rfl: 2    famotidine (PEPCID) 40 MG tablet, TAKE 1 TABLET BY MOUTH DAILY, Disp: 30 tablet, Rfl: 0    fluticasone (FLONASE) 50 MCG/ACT nasal spray, 2 sprays into the nostril(s) as directed by provider Daily., Disp: 16 g, Rfl: 4    Glucosamine HCl 1000 MG tablet, Take 2,000 mg by mouth Daily., Disp: , Rfl:     magnesium oxide (MAGOX) 400 (241.3 Mg) MG tablet tablet, Take 1 tablet by mouth Daily., Disp: , Rfl:     memantine (NAMENDA XR) 28 MG capsule sustained-release 24 hr extended release capsule, , Disp: , Rfl:     Mirabegron ER (Myrbetriq) 25 MG tablet sustained-release 24 hour 24 hr tablet, Take 1 tablet by mouth Daily., Disp: 30 tablet, Rfl: 0    montelukast (Singulair) 10 MG tablet, One PO daily, Disp: 90 tablet, Rfl: 1    pantoprazole (PROTONIX) 40 MG EC tablet, Take 1 tablet by mouth Daily., Disp: 90 tablet, Rfl: 3    traZODone (DESYREL) 100 MG tablet, Take 1 tablet by mouth every night at bedtime., Disp: , Rfl:     triamcinolone (KENALOG) 0.025 % cream, Apply 1 application  topically to the appropriate area as directed 2 (Two) Times a Day., Disp: 80 g, Rfl: 1    Wal-phed PE 10 MG tablet, Take 1 tablet by mouth Every 12 (Twelve) Hours., Disp: , Rfl:     [START ON 3/5/2024] zoledronic acid (Reclast) 5 MG/100ML solution infusion, Infuse 100 mL into a venous catheter 1 (One) Time for 1 dose., Disp: , Rfl:       The following portions of the patient's history were reviewed and updated as appropriate: allergies, current medications, past family history, past medical history, past social history, past surgical history and problem list.        Objective   /68   Pulse 89   Ht 162.6 cm (64\")   Wt 58.5 kg (129 lb)   SpO2 96%   BMI 22.14 kg/m²         Results for orders placed or performed in visit on 01/23/24   CBC (No Diff)    Specimen: Blood   Result Value Ref Range    WBC 5.92 3.40 - 10.80 10*3/mm3    RBC 3.99 3.77 - 5.28 10*6/mm3    Hemoglobin 12.8 12.0 - 15.9 " g/dL    Hematocrit 38.3 34.0 - 46.6 %    MCV 96.0 79.0 - 97.0 fL    MCH 32.1 26.6 - 33.0 pg    MCHC 33.4 31.5 - 35.7 g/dL    RDW 12.2 (L) 12.3 - 15.4 %    RDW-SD 42.6 37.0 - 54.0 fl    MPV 11.0 6.0 - 12.0 fL    Platelets 259 140 - 450 10*3/mm3   Comprehensive Metabolic Panel    Specimen: Blood   Result Value Ref Range    Glucose 88 65 - 99 mg/dL    BUN 20 8 - 23 mg/dL    Creatinine 0.84 0.57 - 1.00 mg/dL    Sodium 141 136 - 145 mmol/L    Potassium 4.4 3.5 - 5.2 mmol/L    Chloride 103 98 - 107 mmol/L    CO2 28.6 22.0 - 29.0 mmol/L    Calcium 9.1 8.6 - 10.5 mg/dL    Total Protein 6.8 6.0 - 8.5 g/dL    Albumin 4.4 3.5 - 5.2 g/dL    ALT (SGPT) 17 1 - 33 U/L    AST (SGOT) 25 1 - 32 U/L    Alkaline Phosphatase 76 39 - 117 U/L    Total Bilirubin 0.3 0.0 - 1.2 mg/dL    Globulin 2.4 gm/dL    A/G Ratio 1.8 g/dL    BUN/Creatinine Ratio 23.8 7.0 - 25.0    Anion Gap 9.4 5.0 - 15.0 mmol/L    eGFR 72.1 >60.0 mL/min/1.73   Lipid Panel    Specimen: Blood   Result Value Ref Range    Total Cholesterol 214 (H) 0 - 200 mg/dL    Triglycerides 63 0 - 150 mg/dL    HDL Cholesterol 70 (H) 40 - 60 mg/dL    LDL Cholesterol  133 (H) 0 - 100 mg/dL    VLDL Cholesterol 11 5 - 40 mg/dL    LDL/HDL Ratio 1.88    TSH Rfx On Abnormal To Free T4    Specimen: Blood   Result Value Ref Range    TSH 2.820 0.270 - 4.200 uIU/mL   Vitamin D,25-Hydroxy    Specimen: Blood   Result Value Ref Range    25 Hydroxy, Vitamin D 46.6 30.0 - 100.0 ng/ml   Vitamin B12    Specimen: Blood   Result Value Ref Range    Vitamin B-12 1,922 (H) 211 - 946 pg/mL   Hemoglobin A1c    Specimen: Blood   Result Value Ref Range    Hemoglobin A1C 5.50 4.80 - 5.60 %             Assessment & Plan   Diagnoses and all orders for this visit:    Medicare annual wellness visit, subsequent    Dyslipidemia  -     Comprehensive Metabolic Panel; Future  -     Lipid Panel; Future  -     TSH Rfx On Abnormal To Free T4; Future    Gastroesophageal reflux disease without esophagitis  -     CBC (No Diff);  Future    B12 deficiency  -     Vitamin B12; Future    Vitamin D deficiency  -     Vitamin D,25-Hydroxy; Future  -     Vitamin B12; Future    Elevated glucose  -     Hemoglobin A1c; Future    Menopausal osteoporosis  -     zoledronic acid (Reclast) 5 MG/100ML solution infusion; Infuse 100 mL into a venous catheter 1 (One) Time for 1 dose.    Dog bite, initial encounter    Wound dressed. Continue doxy. Call if not completely healed at the end of the abx, will refill.           PHQ-2/PHQ-9 Depression screening reviewed with patient . Total time spent today for depression screening  with Maura Barry  was 15 minutes in counseling, along with plans for any diagnositc work-up and treatment.    Advice and education were given regarding cardiovascular risk reduction, healthy dietary habits, Seatbelt and helmet use and self skin examination.          Electronically signed by:    Bronwyn Bird MD           Follow Up   Return in about 6 months (around 7/26/2024) for Next scheduled follow up nd wellnessin 1 year.  Patient was given instructions and counseling regarding her condition or for health maintenance advice. Please see specific information pulled into the AVS if appropriate.

## 2024-02-12 ENCOUNTER — HOSPITAL ENCOUNTER (OUTPATIENT)
Dept: GENERAL RADIOLOGY | Facility: HOSPITAL | Age: 79
Discharge: HOME | End: 2024-02-12
Admitting: INTERNAL MEDICINE
Payer: MEDICARE

## 2024-02-12 DIAGNOSIS — R10.2 PELVIC PAIN: ICD-10-CM

## 2024-02-12 PROCEDURE — 72202 X-RAY EXAM SI JOINTS 3/> VWS: CPT

## 2024-02-15 ENCOUNTER — PATIENT ROUNDING (BHMG ONLY) (OUTPATIENT)
Dept: URGENT CARE | Facility: CLINIC | Age: 79
End: 2024-02-15
Payer: MEDICARE

## 2024-02-28 DIAGNOSIS — K21.9 GASTROESOPHAGEAL REFLUX DISEASE WITHOUT ESOPHAGITIS: ICD-10-CM

## 2024-02-28 RX ORDER — PANTOPRAZOLE SODIUM 40 MG/1
40 TABLET, DELAYED RELEASE ORAL DAILY
Qty: 90 TABLET | Refills: 1 | Status: SHIPPED | OUTPATIENT
Start: 2024-02-28

## 2024-03-04 RX ORDER — SODIUM CHLORIDE 9 MG/ML
20 INJECTION, SOLUTION INTRAVENOUS ONCE
Status: CANCELLED | OUTPATIENT
Start: 2024-03-07

## 2024-03-04 RX ORDER — ZOLEDRONIC ACID 5 MG/100ML
5 INJECTION, SOLUTION INTRAVENOUS ONCE
Status: CANCELLED | OUTPATIENT
Start: 2024-03-07

## 2024-03-07 ENCOUNTER — INFUSION (OUTPATIENT)
Dept: ONCOLOGY | Facility: HOSPITAL | Age: 79
End: 2024-03-07
Payer: MEDICARE

## 2024-03-07 VITALS
SYSTOLIC BLOOD PRESSURE: 116 MMHG | HEART RATE: 81 BPM | HEIGHT: 64 IN | DIASTOLIC BLOOD PRESSURE: 49 MMHG | TEMPERATURE: 96.9 F | RESPIRATION RATE: 18 BRPM | WEIGHT: 131 LBS | BODY MASS INDEX: 22.36 KG/M2

## 2024-03-07 DIAGNOSIS — M81.0 MENOPAUSAL OSTEOPOROSIS: Primary | ICD-10-CM

## 2024-03-07 LAB — CREAT BLDA-MCNC: 1.1 MG/DL (ref 0.6–1.3)

## 2024-03-07 PROCEDURE — 96374 THER/PROPH/DIAG INJ IV PUSH: CPT

## 2024-03-07 PROCEDURE — 25010000002 ZOLEDRONIC ACID 5 MG/100ML SOLUTION: Performed by: INTERNAL MEDICINE

## 2024-03-07 PROCEDURE — 25810000003 SODIUM CHLORIDE 0.9 % SOLUTION: Performed by: INTERNAL MEDICINE

## 2024-03-07 PROCEDURE — 82565 ASSAY OF CREATININE: CPT | Performed by: INTERNAL MEDICINE

## 2024-03-07 RX ORDER — ZOLEDRONIC ACID 5 MG/100ML
5 INJECTION, SOLUTION INTRAVENOUS ONCE
Status: COMPLETED | OUTPATIENT
Start: 2024-03-07 | End: 2024-03-07

## 2024-03-07 RX ORDER — ZOLEDRONIC ACID 5 MG/100ML
5 INJECTION, SOLUTION INTRAVENOUS ONCE
Status: CANCELLED | OUTPATIENT
Start: 2024-03-07

## 2024-03-07 RX ORDER — SODIUM CHLORIDE 9 MG/ML
20 INJECTION, SOLUTION INTRAVENOUS ONCE
Status: COMPLETED | OUTPATIENT
Start: 2024-03-07 | End: 2024-03-07

## 2024-03-07 RX ORDER — SODIUM CHLORIDE 9 MG/ML
20 INJECTION, SOLUTION INTRAVENOUS ONCE
Status: CANCELLED | OUTPATIENT
Start: 2024-03-07

## 2024-03-07 RX ADMIN — ZOLEDRONIC ACID 5 MG: 5 INJECTION, SOLUTION INTRAVENOUS at 09:41

## 2024-03-07 RX ADMIN — SODIUM CHLORIDE 20 ML/HR: 9 INJECTION, SOLUTION INTRAVENOUS at 09:41

## 2024-03-14 ENCOUNTER — PATIENT MESSAGE (OUTPATIENT)
Dept: INTERNAL MEDICINE | Facility: CLINIC | Age: 79
End: 2024-03-14
Payer: MEDICARE

## 2024-03-14 NOTE — TELEPHONE ENCOUNTER
From: Maura Barry  To: Bronwyn Bird  Sent: 3/14/2024 11:18 AM EDT  Subject: Triamcinolone cream    Dr. MARTINEZ, please send new refills to new [facility]: [facility] [address]. I know I’m not following correct procedure. I’ve been having trouble with [facility]. Please forgive me, it’s a one time deal. Thanks, Dorota

## 2024-03-15 ENCOUNTER — TELEPHONE (OUTPATIENT)
Dept: INTERNAL MEDICINE | Facility: CLINIC | Age: 79
End: 2024-03-15
Payer: MEDICARE

## 2024-03-15 RX ORDER — TRIAMCINOLONE ACETONIDE 0.25 MG/G
1 CREAM TOPICAL 2 TIMES DAILY
Qty: 80 G | Refills: 1 | Status: SHIPPED | OUTPATIENT
Start: 2024-03-15

## 2024-03-15 NOTE — TELEPHONE ENCOUNTER
----- Message from Maura Barry sent at 3/14/2024  7:04 PM EDT -----  Regarding: Triamcinolone cream  Contact: [phone]  Анна. It was in the title. Triamcinolone Cream. Thank you. Dorota

## 2024-05-01 ENCOUNTER — OFFICE VISIT (OUTPATIENT)
Dept: INTERNAL MEDICINE | Facility: CLINIC | Age: 79
End: 2024-05-01
Payer: MEDICARE

## 2024-05-01 ENCOUNTER — LAB (OUTPATIENT)
Dept: LAB | Facility: HOSPITAL | Age: 79
End: 2024-05-01
Payer: MEDICARE

## 2024-05-01 VITALS
RESPIRATION RATE: 18 BRPM | HEART RATE: 92 BPM | SYSTOLIC BLOOD PRESSURE: 122 MMHG | BODY MASS INDEX: 22.53 KG/M2 | TEMPERATURE: 97.8 F | WEIGHT: 132 LBS | DIASTOLIC BLOOD PRESSURE: 60 MMHG | OXYGEN SATURATION: 97 % | HEIGHT: 64 IN

## 2024-05-01 DIAGNOSIS — M35.3 POLYMYALGIA RHEUMATICA: ICD-10-CM

## 2024-05-01 DIAGNOSIS — R53.83 OTHER FATIGUE: Primary | ICD-10-CM

## 2024-05-01 DIAGNOSIS — R68.89 ABNORMAL MOVEMENT OF JAW: ICD-10-CM

## 2024-05-01 DIAGNOSIS — R53.83 OTHER FATIGUE: ICD-10-CM

## 2024-05-01 LAB
BASOPHILS # BLD AUTO: 0.04 10*3/MM3 (ref 0–0.2)
BASOPHILS NFR BLD AUTO: 0.7 % (ref 0–1.5)
BILIRUB BLD-MCNC: NEGATIVE MG/DL
CLARITY, POC: CLEAR
COLOR UR: YELLOW
DEPRECATED RDW RBC AUTO: 41.4 FL (ref 37–54)
EOSINOPHIL # BLD AUTO: 0.23 10*3/MM3 (ref 0–0.4)
EOSINOPHIL NFR BLD AUTO: 3.8 % (ref 0.3–6.2)
ERYTHROCYTE [DISTWIDTH] IN BLOOD BY AUTOMATED COUNT: 11.9 % (ref 12.3–15.4)
ERYTHROCYTE [SEDIMENTATION RATE] IN BLOOD: 17 MM/HR (ref 0–30)
EXPIRATION DATE: ABNORMAL
GLUCOSE UR STRIP-MCNC: NEGATIVE MG/DL
HCT VFR BLD AUTO: 38.6 % (ref 34–46.6)
HGB BLD-MCNC: 12.7 G/DL (ref 12–15.9)
IMM GRANULOCYTES # BLD AUTO: 0.01 10*3/MM3 (ref 0–0.05)
IMM GRANULOCYTES NFR BLD AUTO: 0.2 % (ref 0–0.5)
KETONES UR QL: NEGATIVE
LEUKOCYTE EST, POC: NEGATIVE
LYMPHOCYTES # BLD AUTO: 1.36 10*3/MM3 (ref 0.7–3.1)
LYMPHOCYTES NFR BLD AUTO: 22.2 % (ref 19.6–45.3)
Lab: ABNORMAL
MCH RBC QN AUTO: 31.6 PG (ref 26.6–33)
MCHC RBC AUTO-ENTMCNC: 32.9 G/DL (ref 31.5–35.7)
MCV RBC AUTO: 96 FL (ref 79–97)
MONOCYTES # BLD AUTO: 0.56 10*3/MM3 (ref 0.1–0.9)
MONOCYTES NFR BLD AUTO: 9.2 % (ref 5–12)
NEUTROPHILS NFR BLD AUTO: 3.92 10*3/MM3 (ref 1.7–7)
NEUTROPHILS NFR BLD AUTO: 63.9 % (ref 42.7–76)
NITRITE UR-MCNC: NEGATIVE MG/ML
NRBC BLD AUTO-RTO: 0 /100 WBC (ref 0–0.2)
PH UR: 6 [PH] (ref 5–8)
PLATELET # BLD AUTO: 254 10*3/MM3 (ref 140–450)
PMV BLD AUTO: 10.8 FL (ref 6–12)
PROT UR STRIP-MCNC: NEGATIVE MG/DL
RBC # BLD AUTO: 4.02 10*6/MM3 (ref 3.77–5.28)
RBC # UR STRIP: ABNORMAL /UL
SP GR UR: 1.02 (ref 1–1.03)
UROBILINOGEN UR QL: NORMAL
WBC NRBC COR # BLD AUTO: 6.12 10*3/MM3 (ref 3.4–10.8)

## 2024-05-01 PROCEDURE — 84443 ASSAY THYROID STIM HORMONE: CPT

## 2024-05-01 PROCEDURE — 99214 OFFICE O/P EST MOD 30 MIN: CPT | Performed by: INTERNAL MEDICINE

## 2024-05-01 PROCEDURE — 85652 RBC SED RATE AUTOMATED: CPT

## 2024-05-01 PROCEDURE — 80053 COMPREHEN METABOLIC PANEL: CPT

## 2024-05-01 PROCEDURE — 86431 RHEUMATOID FACTOR QUANT: CPT

## 2024-05-01 PROCEDURE — 36415 COLL VENOUS BLD VENIPUNCTURE: CPT

## 2024-05-01 PROCEDURE — 81003 URINALYSIS AUTO W/O SCOPE: CPT | Performed by: INTERNAL MEDICINE

## 2024-05-01 PROCEDURE — 86038 ANTINUCLEAR ANTIBODIES: CPT

## 2024-05-01 PROCEDURE — 86140 C-REACTIVE PROTEIN: CPT

## 2024-05-01 PROCEDURE — 85025 COMPLETE CBC W/AUTO DIFF WBC: CPT

## 2024-05-01 RX ORDER — PREDNISONE 10 MG/1
TABLET ORAL
Qty: 15 TABLET | Refills: 0 | Status: SHIPPED | OUTPATIENT
Start: 2024-05-01

## 2024-05-01 NOTE — PROGRESS NOTES
Generalized Body Aches (x2mo) and Itching (Generalized itching, x6mo)    Subjective   Maura Barry is a 78 y.o. female is here today for follow-up.    Extremity Pain   Associated symptoms include itching. Pertinent negatives include no fever or numbness.     HAs been seeing Dr. Guillen at pain management, and her micro surgery for her back..  Reports that over the last few months has been having severe fatigue, and muscle and joint pains- er shoulders, hips, neck, legs and feet. Unable to do even minimal activity.  Denies frequency , urgency or dysuria    Estefania Dover- [facility], and and regular PT at [facility].      Answers submitted by the patient for this visit:  Primary Reason for Visit (Submitted on 4/30/2024)  What is the primary reason for your visit?: Extremity Pain  Lower Extremity Injury Questionnaire (Submitted on 4/30/2024)  Chief Complaint: Extremity pain      Current Outpatient Medications:     ALLERGY SERUM INJECTION, Inject  under the skin 1 (One) Time Per Week., Disp: , Rfl:     Ascorbic Acid (VITAMIN C) 500 MG capsule, Take 1,000 mg by mouth Daily., Disp: , Rfl:     buPROPion XL (WELLBUTRIN XL) 300 MG 24 hr tablet, Take 1 tablet by mouth Daily., Disp: , Rfl: 2    Cholecalciferol (VITAMIN D3) 5000 units capsule capsule, Take 1 capsule by mouth Daily., Disp: , Rfl:     Cyanocobalamin 1000 MCG sublingual tablet, Place 1 tablet under the tongue Daily., Disp: 30 each, Rfl: 5    desvenlafaxine (PRISTIQ) 50 MG 24 hr tablet, , Disp: , Rfl:     estradiol (ESTRACE) 0.1 MG/GM vaginal cream, INSERT ONE GRAM VAGINALLY TWO TO THREE TIMES PER WEEK AS DIRECTED, Disp: 42 g, Rfl: 2    fluticasone (FLONASE) 50 MCG/ACT nasal spray, 2 sprays into the nostril(s) as directed by provider Daily., Disp: 16 g, Rfl: 4    Glucosamine HCl 1000 MG tablet, Take 2,000 mg by mouth Daily., Disp: , Rfl:     magnesium oxide (MAGOX) 400 (241.3 Mg) MG tablet tablet, Take 1 tablet by mouth Daily., Disp: , Rfl:     memantine  (NAMENDA XR) 28 MG capsule sustained-release 24 hr extended release capsule, , Disp: , Rfl:     Mirabegron ER (Myrbetriq) 25 MG tablet sustained-release 24 hour 24 hr tablet, Take 1 tablet by mouth Daily. (Patient taking differently: Take 2 tablets by mouth Daily.), Disp: 30 tablet, Rfl: 0    pantoprazole (PROTONIX) 40 MG EC tablet, TAKE 1 TABLET BY MOUTH DAILY, Disp: 90 tablet, Rfl: 1    traZODone (DESYREL) 100 MG tablet, Take 1 tablet by mouth every night at bedtime., Disp: , Rfl:     Wal-phed PE 10 MG tablet, Take 1 tablet by mouth Every 12 (Twelve) Hours., Disp: , Rfl:     montelukast (Singulair) 10 MG tablet, One PO daily (Patient not taking: Reported on 5/1/2024), Disp: 90 tablet, Rfl: 1    predniSONE (DELTASONE) 10 MG tablet, Take 1 tabs BID x 3 days then 1&1/2 tab daily x 3 days then 1 tab daily x 3 days then 1/2 tab daily x 3days then stop., Disp: 15 tablet, Rfl: 0    triamcinolone (KENALOG) 0.025 % cream, Apply 1 Application topically to the appropriate area as directed 2 (Two) Times a Day. (Patient not taking: Reported on 5/1/2024), Disp: 80 g, Rfl: 1    zoledronic acid (Reclast) 5 MG/100ML solution infusion, Infuse 100 mL into a venous catheter 1 (One) Time for 1 dose., Disp: , Rfl:       The following portions of the patient's history were reviewed and updated as appropriate: allergies, current medications, past family history, past medical history, past social history, past surgical history and problem list.    Review of Systems   Constitutional:  Positive for fatigue. Negative for chills and fever.   HENT:  Negative for ear discharge, ear pain, sinus pressure and sore throat.    Respiratory:  Negative for cough, chest tightness and shortness of breath.    Cardiovascular:  Negative for chest pain, palpitations and leg swelling.   Gastrointestinal:  Negative for diarrhea, nausea and vomiting.   Musculoskeletal:  Positive for arthralgias and myalgias. Negative for back pain.   Skin:  Positive for  "itching.   Neurological:  Positive for weakness. Negative for dizziness, syncope, numbness and headaches.   Psychiatric/Behavioral:  Negative for confusion and sleep disturbance.        Objective   /60 (BP Location: Left arm, Patient Position: Sitting, Cuff Size: Adult)   Pulse 92   Temp 97.8 °F (36.6 °C) (Infrared)   Resp 18   Ht 162.6 cm (64\")   Wt 59.9 kg (132 lb)   SpO2 97%   BMI 22.66 kg/m²   Physical Exam  Vitals and nursing note reviewed.   Constitutional:       Appearance: She is well-developed.   HENT:      Head: Normocephalic and atraumatic.      Right Ear: External ear normal.      Left Ear: External ear normal.      Mouth/Throat:      Pharynx: No oropharyngeal exudate.   Eyes:      Conjunctiva/sclera: Conjunctivae normal.      Pupils: Pupils are equal, round, and reactive to light.   Neck:      Thyroid: No thyromegaly.   Cardiovascular:      Rate and Rhythm: Normal rate and regular rhythm.      Pulses: Normal pulses.      Heart sounds: Normal heart sounds. No murmur heard.     No friction rub. No gallop.   Pulmonary:      Effort: Pulmonary effort is normal.      Breath sounds: Normal breath sounds.   Abdominal:      General: Bowel sounds are normal. There is no distension.      Palpations: Abdomen is soft.      Tenderness: There is no abdominal tenderness.   Musculoskeletal:         General: Tenderness (diffuse - neck, back and elbows) present. No swelling or deformity.      Cervical back: Neck supple.   Skin:     General: Skin is warm and dry.   Neurological:      Mental Status: She is alert and oriented to person, place, and time.      Cranial Nerves: No cranial nerve deficit.      Comments: Mildly increased jaw movements   Psychiatric:         Judgment: Judgment normal.         BMI is within normal parameters. No other follow-up for BMI required.       Results for orders placed or performed in visit on 05/01/24   POCT urinalysis dipstick, automated    Specimen: Urine   Result Value Ref " Range    Color Yellow Yellow, Straw, Dark Yellow, Maryanne    Clarity, UA Clear Clear    Specific Gravity  1.020 1.005 - 1.030    pH, Urine 6.0 5.0 - 8.0    Leukocytes Negative Negative    Nitrite, UA Negative Negative    Protein, POC Negative Negative mg/dL    Glucose, UA Negative Negative mg/dL    Ketones, UA Negative Negative    Urobilinogen, UA Normal Normal, 0.2 E.U./dL    Bilirubin Negative Negative    Blood, UA Trace (A) Negative    Lot Number [id number]     Expiration Date 1/14/25              Assessment & Plan   Diagnoses and all orders for this visit:    Other fatigue  -     Sedimentation Rate; Future  -     C-reactive Protein; Future  -     CBC & Differential; Future  -     TSH Rfx On Abnormal To Free T4; Future  -     POCT urinalysis dipstick, automated  -     Urine Culture - Urine, Urine, Clean Catch; Future  -     Cancel: Urine Culture - Urine, Urine, Clean Catch  -     Urine Culture - Urine, Urine, Clean Catch; Future  -     Cancel: Urine Culture - Urine, Urine, Clean Catch    Polymyalgia rheumatica  -     KEEGAN by IFA, Reflex to Titer and Pattern; Future  -     Comprehensive Metabolic Panel; Future  -     Rheumatoid Factor; Future  -     Sedimentation Rate; Future  -     C-reactive Protein; Future  -     CBC & Differential; Future  -     TSH Rfx On Abnormal To Free T4; Future  -     predniSONE (DELTASONE) 10 MG tablet; Take 1 tabs BID x 3 days then 1&1/2 tab daily x 3 days then 1 tab daily x 3 days then 1/2 tab daily x 3days then stop.  -     Urine Culture - Urine, Urine, Clean Catch; Future  -     Cancel: Urine Culture - Urine, Urine, Clean Catch    Abnormal movement of jaw  Comments:  ? TDs , has appt with Dr. Fuller at [facility]  Orders:  -     Urine Culture - Urine, Urine, Clean Catch; Future  -     Cancel: Urine Culture - Urine, Urine, Clean Catch      Symptoms suggestive of polymyalgia rheumatica but possible fibromyalgia overlap.  Check labs as above, start steroid taper.           Return for  Next scheduled follow up.    Electronically signed by:    Bronwyn Bird MD

## 2024-05-02 LAB
ALBUMIN SERPL-MCNC: 4.5 G/DL (ref 3.5–5.2)
ALBUMIN/GLOB SERPL: 1.9 G/DL
ALP SERPL-CCNC: 67 U/L (ref 39–117)
ALT SERPL W P-5'-P-CCNC: 19 U/L (ref 1–33)
ANION GAP SERPL CALCULATED.3IONS-SCNC: 9 MMOL/L (ref 5–15)
AST SERPL-CCNC: 20 U/L (ref 1–32)
BILIRUB SERPL-MCNC: 0.2 MG/DL (ref 0–1.2)
BUN SERPL-MCNC: 30 MG/DL (ref 8–23)
BUN/CREAT SERPL: 33.3 (ref 7–25)
CALCIUM SPEC-SCNC: 9 MG/DL (ref 8.6–10.5)
CHLORIDE SERPL-SCNC: 103 MMOL/L (ref 98–107)
CHROMATIN AB SERPL-ACNC: <10 IU/ML (ref 0–14)
CO2 SERPL-SCNC: 25 MMOL/L (ref 22–29)
CREAT SERPL-MCNC: 0.9 MG/DL (ref 0.57–1)
CRP SERPL-MCNC: 0.45 MG/DL (ref 0–0.5)
EGFRCR SERPLBLD CKD-EPI 2021: 66.4 ML/MIN/1.73
GLOBULIN UR ELPH-MCNC: 2.4 GM/DL
GLUCOSE SERPL-MCNC: 76 MG/DL (ref 65–99)
POTASSIUM SERPL-SCNC: 4.2 MMOL/L (ref 3.5–5.2)
PROT SERPL-MCNC: 6.9 G/DL (ref 6–8.5)
SODIUM SERPL-SCNC: 137 MMOL/L (ref 136–145)
TSH SERPL DL<=0.05 MIU/L-ACNC: 1.98 UIU/ML (ref 0.27–4.2)

## 2024-05-03 LAB — ANA SER QL IF: NEGATIVE

## 2024-06-26 ENCOUNTER — TRANSCRIBE ORDERS (OUTPATIENT)
Dept: ADMINISTRATIVE | Facility: HOSPITAL | Age: 79
End: 2024-06-26
Payer: MEDICARE

## 2024-06-26 DIAGNOSIS — Z12.31 BREAST CANCER SCREENING BY MAMMOGRAM: Primary | ICD-10-CM

## 2024-07-26 ENCOUNTER — HOSPITAL ENCOUNTER (OUTPATIENT)
Dept: MAMMOGRAPHY | Facility: HOSPITAL | Age: 79
Discharge: HOME | End: 2024-07-26
Admitting: INTERNAL MEDICINE
Payer: MEDICARE

## 2024-07-26 DIAGNOSIS — Z12.31 BREAST CANCER SCREENING BY MAMMOGRAM: ICD-10-CM

## 2024-07-26 PROCEDURE — 77067 SCR MAMMO BI INCL CAD: CPT

## 2024-07-26 PROCEDURE — 77063 BREAST TOMOSYNTHESIS BI: CPT

## 2024-07-29 ENCOUNTER — LAB (OUTPATIENT)
Dept: LAB | Facility: HOSPITAL | Age: 79
End: 2024-07-29
Payer: MEDICARE

## 2024-07-29 ENCOUNTER — OFFICE VISIT (OUTPATIENT)
Dept: INTERNAL MEDICINE | Facility: CLINIC | Age: 79
End: 2024-07-29
Payer: MEDICARE

## 2024-07-29 VITALS
OXYGEN SATURATION: 96 % | DIASTOLIC BLOOD PRESSURE: 80 MMHG | SYSTOLIC BLOOD PRESSURE: 140 MMHG | TEMPERATURE: 96.8 F | WEIGHT: 136.4 LBS | HEIGHT: 64 IN | BODY MASS INDEX: 23.29 KG/M2 | HEART RATE: 82 BPM

## 2024-07-29 DIAGNOSIS — E78.5 DYSLIPIDEMIA: ICD-10-CM

## 2024-07-29 DIAGNOSIS — R03.0 ELEVATED BP WITHOUT DIAGNOSIS OF HYPERTENSION: ICD-10-CM

## 2024-07-29 DIAGNOSIS — G47.09 OTHER INSOMNIA: ICD-10-CM

## 2024-07-29 DIAGNOSIS — R30.0 DYSURIA: Primary | ICD-10-CM

## 2024-07-29 DIAGNOSIS — M25.50 ARTHRALGIA OF MULTIPLE JOINTS: ICD-10-CM

## 2024-07-29 DIAGNOSIS — J30.89 SEASONAL ALLERGIC RHINITIS DUE TO OTHER ALLERGIC TRIGGER: ICD-10-CM

## 2024-07-29 DIAGNOSIS — G24.01 TARDIVE DYSKINESIA: ICD-10-CM

## 2024-07-29 DIAGNOSIS — R53.83 OTHER FATIGUE: ICD-10-CM

## 2024-07-29 PROBLEM — J30.9 ALLERGIC RHINITIS DUE TO ALLERGEN: Status: ACTIVE | Noted: 2024-07-29

## 2024-07-29 LAB
ALBUMIN SERPL-MCNC: 4.4 G/DL (ref 3.5–5.2)
ALBUMIN/GLOB SERPL: 1.7 G/DL
ALP SERPL-CCNC: 77 U/L (ref 39–117)
ALT SERPL W P-5'-P-CCNC: 21 U/L (ref 1–33)
ANION GAP SERPL CALCULATED.3IONS-SCNC: 9 MMOL/L (ref 5–15)
AST SERPL-CCNC: 23 U/L (ref 1–32)
BILIRUB BLD-MCNC: NEGATIVE MG/DL
BILIRUB SERPL-MCNC: 0.4 MG/DL (ref 0–1.2)
BUN SERPL-MCNC: 23 MG/DL (ref 8–23)
BUN/CREAT SERPL: 28.4 (ref 7–25)
CALCIUM SPEC-SCNC: 9.6 MG/DL (ref 8.6–10.5)
CHLORIDE SERPL-SCNC: 99 MMOL/L (ref 98–107)
CHOLEST SERPL-MCNC: 227 MG/DL (ref 0–200)
CLARITY, POC: CLEAR
CO2 SERPL-SCNC: 29 MMOL/L (ref 22–29)
COLOR UR: YELLOW
CREAT SERPL-MCNC: 0.81 MG/DL (ref 0.57–1)
EGFRCR SERPLBLD CKD-EPI 2021: 74.9 ML/MIN/1.73
EXPIRATION DATE: NORMAL
GLOBULIN UR ELPH-MCNC: 2.6 GM/DL
GLUCOSE SERPL-MCNC: 89 MG/DL (ref 65–99)
GLUCOSE UR STRIP-MCNC: NEGATIVE MG/DL
HDLC SERPL-MCNC: 74 MG/DL (ref 40–60)
KETONES UR QL: NEGATIVE
LDLC SERPL CALC-MCNC: 133 MG/DL (ref 0–100)
LDLC/HDLC SERPL: 1.76 {RATIO}
LEUKOCYTE EST, POC: NEGATIVE
Lab: NORMAL
NITRITE UR-MCNC: NEGATIVE MG/ML
PH UR: 7 [PH] (ref 5–8)
POTASSIUM SERPL-SCNC: 4.4 MMOL/L (ref 3.5–5.2)
PROT SERPL-MCNC: 7 G/DL (ref 6–8.5)
PROT UR STRIP-MCNC: NEGATIVE MG/DL
RBC # UR STRIP: NEGATIVE /UL
SODIUM SERPL-SCNC: 137 MMOL/L (ref 136–145)
SP GR UR: 1.01 (ref 1–1.03)
TRIGL SERPL-MCNC: 115 MG/DL (ref 0–150)
UROBILINOGEN UR QL: NORMAL
VLDLC SERPL-MCNC: 20 MG/DL (ref 5–40)

## 2024-07-29 PROCEDURE — 80061 LIPID PANEL: CPT

## 2024-07-29 PROCEDURE — 81003 URINALYSIS AUTO W/O SCOPE: CPT | Performed by: INTERNAL MEDICINE

## 2024-07-29 PROCEDURE — 80053 COMPREHEN METABOLIC PANEL: CPT

## 2024-07-29 PROCEDURE — 99214 OFFICE O/P EST MOD 30 MIN: CPT | Performed by: INTERNAL MEDICINE

## 2024-07-29 PROCEDURE — G2211 COMPLEX E/M VISIT ADD ON: HCPCS | Performed by: INTERNAL MEDICINE

## 2024-07-29 PROCEDURE — 1125F AMNT PAIN NOTED PAIN PRSNT: CPT | Performed by: INTERNAL MEDICINE

## 2024-07-29 RX ORDER — CLONAZEPAM 0.5 MG/1
0.5 TABLET ORAL NIGHTLY
COMMUNITY
Start: 2024-06-10

## 2024-07-29 RX ORDER — CELECOXIB 200 MG/1
200 CAPSULE ORAL DAILY PRN
Qty: 90 CAPSULE | Refills: 1 | Status: SHIPPED | OUTPATIENT
Start: 2024-07-29

## 2024-07-29 RX ORDER — MIRABEGRON 50 MG/1
50 TABLET, FILM COATED, EXTENDED RELEASE ORAL DAILY
COMMUNITY
Start: 2024-07-01

## 2024-07-29 NOTE — PROGRESS NOTES
Dyslipidemia, Heartburn, Dysuria (With low back pain), and Hot Flashes    Subjective   Maura Barry is a 79 y.o. female is here today for follow-up.    Heartburn  She reports no chest pain, no coughing, no nausea or no sore throat.   Dysuria   Pertinent negatives include no chills, nausea or vomiting.     Notes she is always hungry., craves sugar. , has gained 5 lbs.  Also increased salt in her diet,.  Allergies are worse, and has been on a new antihistamine that makes her hungry and has to take a sudafed daily.  Had a birthday party weekend.    Had the MILD procedure for her Lumbar stenosis by Dr. Guillen, and did not seem to help. Had an epidural after and has helped her immensely , seeing Dr. Friend, who has taken her off the trazodone due to the tardive dyskinesia.  S/p MRI and was told no amyloid plaques.  Has excessive sensitivity to heat,          Current Outpatient Medications:     ALLERGY SERUM INJECTION, Inject  under the skin 1 (One) Time Per Week., Disp: , Rfl:     Ascorbic Acid (VITAMIN C) 500 MG capsule, Take 1,000 mg by mouth Daily., Disp: , Rfl:     buPROPion XL (WELLBUTRIN XL) 300 MG 24 hr tablet, Take 1 tablet by mouth Daily., Disp: , Rfl: 2    celecoxib (CeleBREX) 200 MG capsule, Take 1 capsule by mouth Daily As Needed for Mild Pain., Disp: 90 capsule, Rfl: 1    Cholecalciferol (VITAMIN D3) 5000 units capsule capsule, Take 1 capsule by mouth Daily., Disp: , Rfl:     clonazePAM (KlonoPIN) 0.5 MG tablet, Take 1 tablet by mouth Every Night., Disp: , Rfl:     Cyanocobalamin 1000 MCG sublingual tablet, Place 1 tablet under the tongue Daily., Disp: 30 each, Rfl: 5    desvenlafaxine (PRISTIQ) 50 MG 24 hr tablet, , Disp: , Rfl:     estradiol (ESTRACE) 0.1 MG/GM vaginal cream, INSERT ONE GRAM VAGINALLY TWO TO THREE TIMES PER WEEK AS DIRECTED, Disp: 42 g, Rfl: 2    fluticasone (FLONASE) 50 MCG/ACT nasal spray, 2 sprays into the nostril(s) as directed by provider Daily., Disp: 16 g, Rfl: 4    Glucosamine  "HCl 1000 MG tablet, Take 2,000 mg by mouth Daily., Disp: , Rfl:     magnesium oxide (MAGOX) 400 (241.3 Mg) MG tablet tablet, Take 1 tablet by mouth Daily., Disp: , Rfl:     memantine (NAMENDA XR) 28 MG capsule sustained-release 24 hr extended release capsule, , Disp: , Rfl:     montelukast (Singulair) 10 MG tablet, One PO daily, Disp: 90 tablet, Rfl: 1    Myrbetriq 50 MG tablet sustained-release 24 hour 24 hr tablet, Take 50 mg by mouth Daily., Disp: , Rfl:     pantoprazole (PROTONIX) 40 MG EC tablet, TAKE 1 TABLET BY MOUTH DAILY, Disp: 90 tablet, Rfl: 1    triamcinolone (KENALOG) 0.025 % cream, Apply 1 Application topically to the appropriate area as directed 2 (Two) Times a Day., Disp: 80 g, Rfl: 1    Wal-phed PE 10 MG tablet, Take 1 tablet by mouth Every 12 (Twelve) Hours., Disp: , Rfl:     zoledronic acid (Reclast) 5 MG/100ML solution infusion, Infuse 100 mL into a venous catheter 1 (One) Time for 1 dose., Disp: , Rfl:       The following portions of the patient's history were reviewed and updated as appropriate: allergies, current medications, past family history, past medical history, past social history, past surgical history and problem list.    Review of Systems   Constitutional: Negative.  Negative for chills and fever.   HENT:  Negative for ear discharge, ear pain, sinus pressure and sore throat.    Respiratory:  Negative for cough, chest tightness and shortness of breath.    Cardiovascular:  Negative for chest pain, palpitations and leg swelling.   Gastrointestinal:  Negative for diarrhea, nausea and vomiting.   Genitourinary:  Positive for dysuria.   Musculoskeletal:  Positive for back pain and myalgias. Negative for arthralgias.   Neurological:  Negative for dizziness, syncope, speech difficulty (better) and headaches.   Psychiatric/Behavioral:  Negative for confusion and sleep disturbance.        Objective   /80   Pulse 82   Temp 96.8 °F (36 °C)   Ht 162.6 cm (64\")   Wt 61.9 kg (136 lb 6.4 " oz)   SpO2 96% Comment: ra  BMI 23.41 kg/m²   Physical Exam  Vitals and nursing note reviewed.   Constitutional:       Appearance: She is well-developed.   HENT:      Head: Normocephalic and atraumatic.      Right Ear: External ear normal.      Left Ear: External ear normal.      Mouth/Throat:      Pharynx: No oropharyngeal exudate.   Eyes:      Conjunctiva/sclera: Conjunctivae normal.      Pupils: Pupils are equal, round, and reactive to light.   Neck:      Thyroid: No thyromegaly.   Cardiovascular:      Rate and Rhythm: Normal rate and regular rhythm.      Pulses: Normal pulses.      Heart sounds: Normal heart sounds. No murmur heard.     No friction rub. No gallop.   Pulmonary:      Effort: Pulmonary effort is normal.      Breath sounds: Normal breath sounds.   Abdominal:      General: Bowel sounds are normal. There is no distension.      Palpations: Abdomen is soft.      Tenderness: There is no abdominal tenderness.   Musculoskeletal:         General: Tenderness present.      Cervical back: Neck supple.   Skin:     General: Skin is warm and dry.   Neurological:      Mental Status: She is alert and oriented to person, place, and time.      Cranial Nerves: No cranial nerve deficit.   Psychiatric:         Judgment: Judgment normal.         BMI is within normal parameters. No other follow-up for BMI required.       Results for orders placed or performed in visit on 07/29/24   POCT urinalysis dipstick, automated    Specimen: Urine   Result Value Ref Range    Color Yellow Yellow, Straw, Dark Yellow, Maryanne    Clarity, UA Clear Clear    Specific Gravity  1.015 1.005 - 1.030    pH, Urine 7.0 5.0 - 8.0    Leukocytes Negative Negative    Nitrite, UA Negative Negative    Protein, POC Negative Negative mg/dL    Glucose, UA Negative Negative mg/dL    Ketones, UA Negative Negative    Urobilinogen, UA Normal Normal, 0.2 E.U./dL    Bilirubin Negative Negative    Blood, UA Negative Negative    Lot Number [id number]      Expiration Date 11/8/25              Assessment & Plan   Diagnoses and all orders for this visit:    Dysuria  -     POCT urinalysis dipstick, automated    Seasonal allergic rhinitis due to other allergic trigger  Comments:  make sudafed prn, use saline rinse.    Elevated BP without diagnosis of hypertension  Comments:  monitor( off the sudafed) and cinb.    Other insomnia  Comments:  off trazodone, and on klonopin, plan to taper off in 6 weeks.    Other fatigue  -     celecoxib (CeleBREX) 200 MG capsule; Take 1 capsule by mouth Daily As Needed for Mild Pain.    Arthralgia of multiple joints  -     celecoxib (CeleBREX) 200 MG capsule; Take 1 capsule by mouth Daily As Needed for Mild Pain.    Dyslipidemia  -     Comprehensive Metabolic Panel; Future  -     Lipid Panel; Future    Tardive dyskinesia  Comments:  off trazodone. monitor.    Other orders  -     clonazePAM (KlonoPIN) 0.5 MG tablet; Take 1 tablet by mouth Every Night.  -     Myrbetriq 50 MG tablet sustained-release 24 hour 24 hr tablet; Take 50 mg by mouth Daily.      Unsure if BP is from sudafed, added salt or elbrex. Adv to change sudafed and celbrex to prn and cut back on salt. Monitor and CINB, will add amlodipine.           Return for Next scheduled follow up, [facility].    Electronically signed by:    Bronwyn Bird MD   Answers submitted by the patient for this visit:  Other (Submitted on 7/26/2024)  Please describe your symptoms.: semi annual check up, concern about invreased sensitivity to heat  Have you had these symptoms before?: Yes  How long have you been having these symptoms?: Greater than 2 weeks  Please list any medications you are currently taking for this condition.: none  Primary Reason for Visit (Submitted on 7/26/2024)  What is the primary reason for your visit?: Other

## 2024-09-03 ENCOUNTER — OFFICE VISIT (OUTPATIENT)
Dept: INTERNAL MEDICINE | Facility: CLINIC | Age: 79
End: 2024-09-03
Payer: MEDICARE

## 2024-09-03 VITALS
WEIGHT: 135 LBS | OXYGEN SATURATION: 97 % | DIASTOLIC BLOOD PRESSURE: 68 MMHG | HEIGHT: 64 IN | TEMPERATURE: 96.8 F | BODY MASS INDEX: 23.05 KG/M2 | HEART RATE: 93 BPM | SYSTOLIC BLOOD PRESSURE: 112 MMHG

## 2024-09-03 DIAGNOSIS — R00.2 PALPITATIONS: ICD-10-CM

## 2024-09-03 DIAGNOSIS — J45.991 COUGH VARIANT ASTHMA: ICD-10-CM

## 2024-09-03 DIAGNOSIS — J01.00 SUBACUTE MAXILLARY SINUSITIS: Primary | ICD-10-CM

## 2024-09-03 DIAGNOSIS — F13.939: ICD-10-CM

## 2024-09-03 PROCEDURE — 0202U NFCT DS 22 TRGT SARS-COV-2: CPT | Performed by: INTERNAL MEDICINE

## 2024-09-03 PROCEDURE — 1125F AMNT PAIN NOTED PAIN PRSNT: CPT | Performed by: INTERNAL MEDICINE

## 2024-09-03 PROCEDURE — 93000 ELECTROCARDIOGRAM COMPLETE: CPT | Performed by: INTERNAL MEDICINE

## 2024-09-03 PROCEDURE — 99214 OFFICE O/P EST MOD 30 MIN: CPT | Performed by: INTERNAL MEDICINE

## 2024-09-03 RX ORDER — PREDNISONE 10 MG/1
TABLET ORAL
Qty: 18 TABLET | Refills: 0 | Status: SHIPPED | OUTPATIENT
Start: 2024-09-03

## 2024-09-03 RX ORDER — DEXTROMETHORPHAN HYDROBROMIDE AND PROMETHAZINE HYDROCHLORIDE 15; 6.25 MG/5ML; MG/5ML
5 SYRUP ORAL 4 TIMES DAILY PRN
Qty: 200 ML | Refills: 1 | Status: SHIPPED | OUTPATIENT
Start: 2024-09-03

## 2024-09-03 NOTE — PROGRESS NOTES
Palpitations (Off and on for years, however has had constantly for the past couple days); Elevated Blood Pressure; Feels hot all the time, but skin is cold; and Nausea (For about 1 week)    Subjective   Maura Barry is a 79 y.o. female is here today for follow-up.    Palpitations   Associated symptoms include coughing and nausea. Pertinent negatives include no chest pain, dizziness, fever, shortness of breath or vomiting.   Nausea  Associated symptoms include congestion, coughing and nausea. Pertinent negatives include no arthralgias, chest pain, chills, fever, headaches, myalgias, sore throat or vomiting.     Has been hot, nauseous, and has had palps.  Also ST.   Off trazodone since June 26th. Has been tapering off the clonazepam. Off trazodone due to her facial dyskinesia.  Taking melatonin wo help, and benadryl helped y'day. Not sure   Aug 12th tapering off klonopin and was off x 1 week ago, and had to take 1/2 dose last night for sleep.      Current Outpatient Medications:     ALLERGY SERUM INJECTION, Inject  under the skin 1 (One) Time Per Week., Disp: , Rfl:     Ascorbic Acid (VITAMIN C) 500 MG capsule, Take 1,000 mg by mouth Daily., Disp: , Rfl:     buPROPion XL (WELLBUTRIN XL) 300 MG 24 hr tablet, Take 1 tablet by mouth Daily., Disp: , Rfl: 2    celecoxib (CeleBREX) 200 MG capsule, Take 1 capsule by mouth Daily As Needed for Mild Pain., Disp: 90 capsule, Rfl: 1    Cholecalciferol (VITAMIN D3) 5000 units capsule capsule, Take 1 capsule by mouth Daily., Disp: , Rfl:     Cyanocobalamin 1000 MCG sublingual tablet, Place 1 tablet under the tongue Daily., Disp: 30 each, Rfl: 5    desvenlafaxine (PRISTIQ) 50 MG 24 hr tablet, , Disp: , Rfl:     estradiol (ESTRACE) 0.1 MG/GM vaginal cream, INSERT ONE GRAM VAGINALLY TWO TO THREE TIMES PER WEEK AS DIRECTED, Disp: 42 g, Rfl: 2    fluticasone (FLONASE) 50 MCG/ACT nasal spray, 2 sprays into the nostril(s) as directed by provider Daily., Disp: 16 g, Rfl: 4     Glucosamine HCl 1000 MG tablet, Take 2,000 mg by mouth Daily., Disp: , Rfl:     magnesium oxide (MAGOX) 400 (241.3 Mg) MG tablet tablet, Take 1 tablet by mouth Daily., Disp: , Rfl:     melatonin 5 MG tablet tablet, Take  by mouth., Disp: , Rfl:     memantine (NAMENDA XR) 28 MG capsule sustained-release 24 hr extended release capsule, , Disp: , Rfl:     montelukast (Singulair) 10 MG tablet, One PO daily, Disp: 90 tablet, Rfl: 1    Myrbetriq 50 MG tablet sustained-release 24 hour 24 hr tablet, Take 50 mg by mouth Daily., Disp: , Rfl:     pantoprazole (PROTONIX) 40 MG EC tablet, TAKE 1 TABLET BY MOUTH DAILY, Disp: 90 tablet, Rfl: 1    triamcinolone (KENALOG) 0.025 % cream, Apply 1 Application topically to the appropriate area as directed 2 (Two) Times a Day., Disp: 80 g, Rfl: 1    Wal-phed PE 10 MG tablet, Take 1 tablet by mouth Every 12 (Twelve) Hours., Disp: , Rfl:     clonazePAM (KlonoPIN) 0.5 MG tablet, Take 1 tablet by mouth Every Night., Disp: , Rfl:     predniSONE (DELTASONE) 10 MG tablet, Take 3 tabs daily x 3 days then 2 tabs daily x 3 days then 1 tab daily x 3 days then stop., Disp: 18 tablet, Rfl: 0    promethazine-dextromethorphan (PROMETHAZINE-DM) 6.25-15 MG/5ML syrup, Take 5 mL by mouth 4 (Four) Times a Day As Needed for Cough., Disp: 200 mL, Rfl: 1    zoledronic acid (Reclast) 5 MG/100ML solution infusion, Infuse 100 mL into a venous catheter 1 (One) Time for 1 dose., Disp: , Rfl:       The following portions of the patient's history were reviewed and updated as appropriate: allergies, current medications, past family history, past medical history, past social history, past surgical history and problem list.    Review of Systems   Constitutional: Negative.  Negative for chills and fever.   HENT:  Positive for congestion and rhinorrhea. Negative for ear discharge, ear pain, sinus pressure and sore throat.    Respiratory:  Positive for cough. Negative for chest tightness and shortness of breath.   "  Cardiovascular:  Positive for palpitations. Negative for chest pain and leg swelling.   Gastrointestinal:  Positive for nausea. Negative for diarrhea and vomiting.   Endocrine: Positive for heat intolerance.   Musculoskeletal:  Negative for arthralgias, back pain and myalgias.   Neurological:  Negative for dizziness, syncope and headaches.   Psychiatric/Behavioral:  Negative for confusion and sleep disturbance.        Objective   /68   Pulse 93   Temp 96.8 °F (36 °C)   Ht 162.6 cm (64\")   Wt 61.2 kg (135 lb)   SpO2 97% Comment: ra  BMI 23.17 kg/m²   Physical Exam  Vitals and nursing note reviewed.   Constitutional:       Appearance: She is well-developed.   HENT:      Head: Normocephalic and atraumatic.      Right Ear: External ear normal. Tympanic membrane is bulging.      Left Ear: External ear normal. Tympanic membrane is bulging.      Mouth/Throat:      Pharynx: Posterior oropharyngeal erythema present. No oropharyngeal exudate.      Tonsils: No tonsillar abscesses.   Eyes:      Conjunctiva/sclera: Conjunctivae normal.      Pupils: Pupils are equal, round, and reactive to light.   Neck:      Thyroid: No thyromegaly.   Cardiovascular:      Rate and Rhythm: Normal rate and regular rhythm.      Pulses: Normal pulses.      Heart sounds: Normal heart sounds. No murmur heard.     No friction rub. No gallop.   Pulmonary:      Effort: Pulmonary effort is normal.      Breath sounds: Normal breath sounds. No stridor.   Abdominal:      General: Bowel sounds are normal. There is no distension.      Palpations: Abdomen is soft.      Tenderness: There is no abdominal tenderness.   Musculoskeletal:      Cervical back: Normal range of motion and neck supple.   Lymphadenopathy:      Cervical: Cervical adenopathy (tender) present.   Skin:     General: Skin is warm and dry.   Neurological:      Mental Status: She is alert and oriented to person, place, and time.      Cranial Nerves: No cranial nerve deficit. "   Psychiatric:         Judgment: Judgment normal.         BMI is within normal parameters. No other follow-up for BMI required.       Results for orders placed or performed in visit on 07/29/24   Comprehensive Metabolic Panel    Specimen: Blood   Result Value Ref Range    Glucose 89 65 - 99 mg/dL    BUN 23 8 - 23 mg/dL    Creatinine 0.81 0.57 - 1.00 mg/dL    Sodium 137 136 - 145 mmol/L    Potassium 4.4 3.5 - 5.2 mmol/L    Chloride 99 98 - 107 mmol/L    CO2 29.0 22.0 - 29.0 mmol/L    Calcium 9.6 8.6 - 10.5 mg/dL    Total Protein 7.0 6.0 - 8.5 g/dL    Albumin 4.4 3.5 - 5.2 g/dL    ALT (SGPT) 21 1 - 33 U/L    AST (SGOT) 23 1 - 32 U/L    Alkaline Phosphatase 77 39 - 117 U/L    Total Bilirubin 0.4 0.0 - 1.2 mg/dL    Globulin 2.6 gm/dL    A/G Ratio 1.7 g/dL    BUN/Creatinine Ratio 28.4 (H) 7.0 - 25.0    Anion Gap 9.0 5.0 - 15.0 mmol/L    eGFR 74.9 >60.0 mL/min/1.73   Lipid Panel    Specimen: Blood   Result Value Ref Range    Total Cholesterol 227 (H) 0 - 200 mg/dL    Triglycerides 115 0 - 150 mg/dL    HDL Cholesterol 74 (H) 40 - 60 mg/dL    LDL Cholesterol  133 (H) 0 - 100 mg/dL    VLDL Cholesterol 20 5 - 40 mg/dL    LDL/HDL Ratio 1.76            ECG 12 Lead    Date/Time: 9/3/2024 11:45 AM  Performed by: Bronwyn Bird MD    Authorized by: Bronwyn Bird MD  Comparison: compared with previous ECG from 8/7/2023  Similar to previous ECG  Rhythm: sinus rhythm  Rate: normal  Conduction: conduction normal  ST Segments: ST segments normal  T Waves: T waves normal  QRS axis: normal  Other: no other findings    Clinical impression: normal ECG           Assessment & Plan   Diagnoses and all orders for this visit:    Subacute maxillary sinusitis  -     promethazine-dextromethorphan (PROMETHAZINE-DM) 6.25-15 MG/5ML syrup; Take 5 mL by mouth 4 (Four) Times a Day As Needed for Cough.  -     predniSONE (DELTASONE) 10 MG tablet; Take 3 tabs daily x 3 days then 2 tabs daily x 3 days then 1 tab daily x 3 days then stop.  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 new onset, or infection related.  Orders:  -     Respiratory Panel PCR w/COVID-19(SARS-CoV-2) [facility] In-House, NP Swab in UTM/VTM, 2 HR TAT - Swab, Nasopharynx; Future    Other orders  -     melatonin 5 MG tablet tablet; Take  by mouth.  -     ECG 12 Lead      Will call in ABx pending PCR results.  Unlikely withdrawal, as off trazodone since 6/26 and sxs didn't start until return from her trip to [place] after 8/15/24.           No follow-ups on file.    Electronically signed by:    Bronwyn Bird MD

## 2024-09-04 ENCOUNTER — TELEPHONE (OUTPATIENT)
Dept: BEHAVIORAL HEALTH | Facility: CLINIC | Age: 79
End: 2024-09-04
Payer: MEDICARE

## 2024-09-04 NOTE — TELEPHONE ENCOUNTER
----- Message from Bronwyn Bird sent at 9/3/2024 11:25 PM EDT -----  Please make sure patient has seen the following [employer] message:     Your respiratory panel is negative for the infections listed. Therefore your symptoms must be from a regular sinus infection.  I have called in a Z-Paxton antibiotic as discussed at your visit.  Hope you feel better soon!

## 2024-09-05 ENCOUNTER — TELEPHONE (OUTPATIENT)
Dept: INTERNAL MEDICINE | Facility: CLINIC | Age: 79
End: 2024-09-05
Payer: MEDICARE

## 2024-09-05 RX ORDER — AZITHROMYCIN 250 MG/1
TABLET, FILM COATED ORAL
Qty: 6 TABLET | Refills: 0 | Status: SHIPPED | OUTPATIENT
Start: 2024-09-05

## 2024-09-05 NOTE — TELEPHONE ENCOUNTER
Called and left VM with pt that medication had been called in to pharmacy. With questions to return call to office.     OK for HUB to relay message below from provider to pt.

## 2024-09-05 NOTE — TELEPHONE ENCOUNTER
"Caller: Maura Barry \"Dorota\"    Relationship: Self    Best call back number: [phone]    Which medication are you concerned about: Z PACK    Who prescribed you this medication: DR THRASHER    What are your concerns: PATIENT WAS CALLED ON 09/03/2024 STATING THIS MEDICATION WAS GOING TO BE SENT TO PHARMACY HOWEVER PHARMACY HAS NEVER RECEIVED IT. PLEASE ADVISE      "

## 2024-09-16 ENCOUNTER — PATIENT MESSAGE (OUTPATIENT)
Dept: INTERNAL MEDICINE | Facility: CLINIC | Age: 79
End: 2024-09-16
Payer: MEDICARE

## 2024-09-16 DIAGNOSIS — J30.89 SEASONAL ALLERGIC RHINITIS DUE TO OTHER ALLERGIC TRIGGER: ICD-10-CM

## 2024-09-16 RX ORDER — FLUTICASONE PROPIONATE 50 MCG
2 SPRAY, SUSPENSION (ML) NASAL DAILY
Qty: 16 G | Refills: 1 | Status: SHIPPED | OUTPATIENT
Start: 2024-09-16

## 2024-09-25 DIAGNOSIS — K21.9 GASTROESOPHAGEAL REFLUX DISEASE WITHOUT ESOPHAGITIS: ICD-10-CM

## 2024-09-25 RX ORDER — PANTOPRAZOLE SODIUM 40 MG/1
40 TABLET, DELAYED RELEASE ORAL DAILY
Qty: 90 TABLET | Refills: 1 | Status: SHIPPED | OUTPATIENT
Start: 2024-09-25

## 2024-10-04 RX ORDER — TRIAMCINOLONE ACETONIDE 0.25 MG/G
1 CREAM TOPICAL 2 TIMES DAILY
Qty: 80 G | Refills: 1 | Status: SHIPPED | OUTPATIENT
Start: 2024-10-04

## 2024-10-04 RX ORDER — ESTRADIOL 0.1 MG/G
CREAM VAGINAL
Qty: 42 G | Refills: 1 | Status: SHIPPED | OUTPATIENT
Start: 2024-10-04

## 2024-11-07 ENCOUNTER — PATIENT MESSAGE (OUTPATIENT)
Dept: INTERNAL MEDICINE | Facility: CLINIC | Age: 79
End: 2024-11-07
Payer: MEDICARE

## 2024-11-08 RX ORDER — MIRABEGRON 50 MG/1
50 TABLET, FILM COATED, EXTENDED RELEASE ORAL DAILY
Qty: 30 TABLET | Refills: 3 | Status: SHIPPED | OUTPATIENT
Start: 2024-11-08

## 2024-11-19 ENCOUNTER — HOSPITAL ENCOUNTER (OUTPATIENT)
Dept: GENERAL RADIOLOGY | Facility: HOSPITAL | Age: 79
Discharge: HOME | End: 2024-11-19
Admitting: INTERNAL MEDICINE
Payer: MEDICARE

## 2024-11-19 ENCOUNTER — OFFICE VISIT (OUTPATIENT)
Dept: INTERNAL MEDICINE | Facility: CLINIC | Age: 79
End: 2024-11-19
Payer: MEDICARE

## 2024-11-19 VITALS
DIASTOLIC BLOOD PRESSURE: 60 MMHG | WEIGHT: 139.4 LBS | OXYGEN SATURATION: 97 % | TEMPERATURE: 97.1 F | BODY MASS INDEX: 23.8 KG/M2 | HEIGHT: 64 IN | SYSTOLIC BLOOD PRESSURE: 104 MMHG

## 2024-11-19 DIAGNOSIS — M79.672 LEFT FOOT PAIN: Primary | ICD-10-CM

## 2024-11-19 DIAGNOSIS — M79.672 LEFT FOOT PAIN: ICD-10-CM

## 2024-11-19 PROBLEM — N39.0 RECURRENT UTI: Status: ACTIVE | Noted: 2022-08-08

## 2024-11-19 PROBLEM — N32.81 URGENCY-FREQUENCY SYNDROME: Status: ACTIVE | Noted: 2023-08-10

## 2024-11-19 PROBLEM — N81.4 CYSTOCELE WITH PROLAPSE: Status: ACTIVE | Noted: 2021-10-15

## 2024-11-19 PROBLEM — R41.3 MEMORY LOSS: Status: RESOLVED | Noted: 2023-01-03 | Resolved: 2024-11-19

## 2024-11-19 PROBLEM — M54.9 CHRONIC BACK PAIN: Status: ACTIVE | Noted: 2017-08-25

## 2024-11-19 PROBLEM — G58.8 PUDENDAL NEURALGIA: Status: ACTIVE | Noted: 2023-01-13

## 2024-11-19 PROBLEM — G89.29 CHRONIC BACK PAIN: Status: ACTIVE | Noted: 2017-08-25

## 2024-11-19 PROBLEM — M62.89 PFD (PELVIC FLOOR DYSFUNCTION): Status: ACTIVE | Noted: 2022-08-08

## 2024-11-19 PROBLEM — Z98.890 HISTORY OF EPISIOTOMY: Status: ACTIVE | Noted: 2023-05-02

## 2024-11-19 PROBLEM — M79.18 MYOFASCIAL PAIN: Status: ACTIVE | Noted: 2017-08-31

## 2024-11-19 PROBLEM — M51.16 LUMBAR DISC HERNIATION WITH RADICULOPATHY: Status: ACTIVE | Noted: 2022-10-18

## 2024-11-19 PROBLEM — N94.819 VULVODYNIA: Status: ACTIVE | Noted: 2023-03-24

## 2024-11-19 PROBLEM — R44.8 SENSATION OF PRESSURE IN FACE: Status: ACTIVE | Noted: 2019-12-11

## 2024-11-19 PROBLEM — M79.10 TRIGGER POINT: Status: ACTIVE | Noted: 2017-09-22

## 2024-11-19 PROBLEM — G47.00 INSOMNIA: Status: ACTIVE | Noted: 2023-01-03

## 2024-11-19 PROBLEM — J30.0 NONALLERGIC VASOMOTOR RHINITIS: Status: ACTIVE | Noted: 2019-12-11

## 2024-11-19 PROBLEM — F03.90 DEMENTIA: Status: ACTIVE | Noted: 2023-01-03

## 2024-11-19 PROBLEM — R51.9 RECURRENT HEADACHE: Status: ACTIVE | Noted: 2019-12-11

## 2024-11-19 PROBLEM — M48.061 SPINAL STENOSIS OF LUMBAR REGION WITHOUT NEUROGENIC CLAUDICATION: Status: ACTIVE | Noted: 2017-08-25

## 2024-11-19 PROBLEM — N95.2 VAGINAL ATROPHY: Status: ACTIVE | Noted: 2022-08-08

## 2024-11-19 PROBLEM — M51.369 DDD (DEGENERATIVE DISC DISEASE), LUMBAR: Status: ACTIVE | Noted: 2017-08-25

## 2024-11-19 PROBLEM — M46.1 SACROILIITIS: Status: ACTIVE | Noted: 2017-11-09

## 2024-11-19 PROBLEM — R10.2 PERINEUM PAIN, FEMALE: Status: ACTIVE | Noted: 2023-05-01

## 2024-11-19 PROBLEM — G43.909 NONINTRACTABLE CHRONIC MIGRAINE: Status: ACTIVE | Noted: 2019-12-17

## 2024-11-19 PROBLEM — R35.0 INCREASED FREQUENCY OF URINATION: Status: ACTIVE | Noted: 2022-08-08

## 2024-11-19 PROCEDURE — 99213 OFFICE O/P EST LOW 20 MIN: CPT | Performed by: INTERNAL MEDICINE

## 2024-11-19 PROCEDURE — 1160F RVW MEDS BY RX/DR IN RCRD: CPT | Performed by: INTERNAL MEDICINE

## 2024-11-19 PROCEDURE — 1125F AMNT PAIN NOTED PAIN PRSNT: CPT | Performed by: INTERNAL MEDICINE

## 2024-11-19 PROCEDURE — 73630 X-RAY EXAM OF FOOT: CPT

## 2024-11-19 PROCEDURE — 1159F MED LIST DOCD IN RCRD: CPT | Performed by: INTERNAL MEDICINE

## 2024-11-19 NOTE — PROGRESS NOTES
"Subjective   Maura Barry is a 79 y.o. female.   Chief Complaint   Patient presents with    Foot Pain       History of Present Illness   2-3 weeks ago  while at PT, felt she moved her foot to strong on adduction. PT worked on it but pain has not resolved.   The following portions of the patient's history were reviewed and updated as appropriate: allergies, current medications, past family history, past medical history, past social history, past surgical history, and problem list.    Review of Systems   Musculoskeletal:         Left foot pain     /60 (BP Location: Left arm, Patient Position: Sitting, Cuff Size: Adult)   Temp 97.1 °F (36.2 °C) (Temporal)   Ht 162.6 cm (64.02\")   Wt 63.2 kg (139 lb 6.4 oz)   SpO2 97%   BMI 23.92 kg/m²     Objective   Physical Exam  Musculoskeletal:        Feet:    Feet:      Comments: Tender. No erythema or brusing.        Assessment & Plan   Diagnoses and all orders for this visit:    1. Left foot pain (Primary)  -     XR Foot 3+ View Left; Future                 "

## 2024-11-20 ENCOUNTER — TELEPHONE (OUTPATIENT)
Dept: INTERNAL MEDICINE | Facility: CLINIC | Age: 79
End: 2024-11-20
Payer: MEDICARE

## 2024-11-20 DIAGNOSIS — M62.89 PELVIC FLOOR DYSFUNCTION: Primary | ICD-10-CM

## 2024-11-20 NOTE — TELEPHONE ENCOUNTER
[facility]    NEED UPDATED ORDER FOR PHYS THERAPY FOR  PELVIC       FAX: [phone]  CALL BACK: [phone]

## 2024-11-22 ENCOUNTER — TELEPHONE (OUTPATIENT)
Dept: INTERNAL MEDICINE | Facility: CLINIC | Age: 79
End: 2024-11-22
Payer: MEDICARE

## 2024-11-22 NOTE — TELEPHONE ENCOUNTER
"Called and spoke to pt. Message from provider given. Pt voiced understanding and appreciation.  Pt states that her foot \"has settled somewhat but she is still having pain and issues with it\"           ----- Message from Rupinder Collins sent at 11/22/2024  2:21 PM EST -----  May have old healed stress fracture of 2 toe. If still having pain let us know and I will refer hr to foot doctor  "

## 2024-11-25 DIAGNOSIS — M79.672 LEFT FOOT PAIN: Primary | ICD-10-CM

## 2025-01-02 ENCOUNTER — PATIENT MESSAGE (OUTPATIENT)
Dept: INTERNAL MEDICINE | Facility: CLINIC | Age: 80
End: 2025-01-02
Payer: MEDICARE

## 2025-01-02 DIAGNOSIS — N32.81 OAB (OVERACTIVE BLADDER): ICD-10-CM

## 2025-01-02 DIAGNOSIS — R10.2 PELVIC PAIN: Primary | ICD-10-CM

## 2025-01-08 ENCOUNTER — TELEPHONE (OUTPATIENT)
Dept: INTERNAL MEDICINE | Facility: CLINIC | Age: 80
End: 2025-01-08

## 2025-01-20 ENCOUNTER — HOSPITAL ENCOUNTER (EMERGENCY)
Facility: HOSPITAL | Age: 80
Discharge: HOME | End: 2025-01-20
Attending: EMERGENCY MEDICINE | Admitting: EMERGENCY MEDICINE
Payer: MEDICARE

## 2025-01-20 ENCOUNTER — APPOINTMENT (OUTPATIENT)
Dept: GENERAL RADIOLOGY | Facility: HOSPITAL | Age: 80
End: 2025-01-20
Payer: MEDICARE

## 2025-01-20 VITALS
SYSTOLIC BLOOD PRESSURE: 120 MMHG | RESPIRATION RATE: 18 BRPM | BODY MASS INDEX: 23.9 KG/M2 | DIASTOLIC BLOOD PRESSURE: 69 MMHG | HEIGHT: 64 IN | WEIGHT: 140 LBS | TEMPERATURE: 98.2 F | OXYGEN SATURATION: 92 % | HEART RATE: 89 BPM

## 2025-01-20 DIAGNOSIS — S53.105A CLOSED DISLOCATION OF LEFT ELBOW, INITIAL ENCOUNTER: Primary | ICD-10-CM

## 2025-01-20 PROCEDURE — 25010000002 HYDROMORPHONE PER 4 MG: Performed by: EMERGENCY MEDICINE

## 2025-01-20 PROCEDURE — 73080 X-RAY EXAM OF ELBOW: CPT

## 2025-01-20 PROCEDURE — 25010000002 ONDANSETRON PER 1 MG: Performed by: EMERGENCY MEDICINE

## 2025-01-20 PROCEDURE — 96375 TX/PRO/DX INJ NEW DRUG ADDON: CPT

## 2025-01-20 PROCEDURE — 96374 THER/PROPH/DIAG INJ IV PUSH: CPT

## 2025-01-20 PROCEDURE — 25010000002 PROPOFOL 10 MG/ML EMULSION: Performed by: EMERGENCY MEDICINE

## 2025-01-20 PROCEDURE — 99285 EMERGENCY DEPT VISIT HI MDM: CPT

## 2025-01-20 PROCEDURE — 73030 X-RAY EXAM OF SHOULDER: CPT

## 2025-01-20 PROCEDURE — 73110 X-RAY EXAM OF WRIST: CPT

## 2025-01-20 PROCEDURE — 73090 X-RAY EXAM OF FOREARM: CPT

## 2025-01-20 PROCEDURE — 73060 X-RAY EXAM OF HUMERUS: CPT

## 2025-01-20 RX ORDER — PROPOFOL 10 MG/ML
VIAL (ML) INTRAVENOUS
Status: COMPLETED | OUTPATIENT
Start: 2025-01-20 | End: 2025-01-20

## 2025-01-20 RX ORDER — PROPOFOL 10 MG/ML
150 VIAL (ML) INTRAVENOUS ONCE
Status: DISCONTINUED | OUTPATIENT
Start: 2025-01-20 | End: 2025-01-20 | Stop reason: HOSPADM

## 2025-01-20 RX ORDER — HYDROCODONE BITARTRATE AND ACETAMINOPHEN 5; 325 MG/1; MG/1
1 TABLET ORAL EVERY 6 HOURS PRN
Qty: 12 TABLET | Refills: 0 | Status: ON HOLD | OUTPATIENT
Start: 2025-01-20

## 2025-01-20 RX ORDER — ONDANSETRON 2 MG/ML
4 INJECTION INTRAMUSCULAR; INTRAVENOUS ONCE
Status: COMPLETED | OUTPATIENT
Start: 2025-01-20 | End: 2025-01-20

## 2025-01-20 RX ORDER — HYDROCODONE BITARTRATE AND ACETAMINOPHEN 5; 325 MG/1; MG/1
1 TABLET ORAL ONCE
Status: COMPLETED | OUTPATIENT
Start: 2025-01-20 | End: 2025-01-20

## 2025-01-20 RX ORDER — SODIUM CHLORIDE 0.9 % (FLUSH) 0.9 %
10 SYRINGE (ML) INJECTION AS NEEDED
Status: DISCONTINUED | OUTPATIENT
Start: 2025-01-20 | End: 2025-01-20 | Stop reason: HOSPADM

## 2025-01-20 RX ORDER — HYDROMORPHONE HYDROCHLORIDE 1 MG/ML
0.5 INJECTION, SOLUTION INTRAMUSCULAR; INTRAVENOUS; SUBCUTANEOUS ONCE
Status: COMPLETED | OUTPATIENT
Start: 2025-01-20 | End: 2025-01-20

## 2025-01-20 RX ADMIN — ONDANSETRON 4 MG: 2 INJECTION INTRAMUSCULAR; INTRAVENOUS at 14:43

## 2025-01-20 RX ADMIN — HYDROCODONE BITARTRATE AND ACETAMINOPHEN 1 TABLET: 5; 325 TABLET ORAL at 17:23

## 2025-01-20 RX ADMIN — PROPOFOL 50 MG: 10 INJECTION, EMULSION INTRAVENOUS at 16:12

## 2025-01-20 RX ADMIN — HYDROMORPHONE HYDROCHLORIDE 0.5 MG: 1 INJECTION, SOLUTION INTRAMUSCULAR; INTRAVENOUS; SUBCUTANEOUS at 14:43

## 2025-01-21 ENCOUNTER — TELEPHONE (OUTPATIENT)
Age: 80
End: 2025-01-21

## 2025-01-21 ENCOUNTER — PATIENT OUTREACH (OUTPATIENT)
Age: 80
End: 2025-01-21
Payer: MEDICARE

## 2025-01-21 NOTE — TELEPHONE ENCOUNTER
Called to patient know We don't treat this DX. Per Dr Cardona the patient should be seen at .    HUB ok to relay info and can.

## 2025-01-21 NOTE — ED PROVIDER NOTES
Subjective   History of Present Illness  79-year-old female presents for evaluation of left elbow injury.  Just prior to coming to the emergency department today, the patient was walking out of [employer] after a workout when she had a mechanical trip and fall and fell awkwardly onto her left elbow.  She is left-handed.  She suffered immediate pain to her left elbow.  She did not hit her head or lose consciousness.  No neck pain.  She is not anticoagulated.  No paresthesias.  EMS was called to the scene and she was then brought here for further evaluation and treatment.  She is complaining of pain to her entire left upper extremity but notes that the pain seems to be worse in her left elbow region.  Pain is worse with attempted movement.  She currently rates her pain at 7 out of 10 in severity.      Review of Systems   Musculoskeletal:         Left upper extremity pain, left elbow pain   All other systems reviewed and are negative.      Past Medical History:   Diagnosis Date   • Allergic    • Arthritis    • Backache    • Depression    • Depression    • Dysuria    • GERD (gastroesophageal reflux disease) 5 years ago   • Hyperlipidemia 202s   • Neck pain    • Osteopenia 2022   • Pelvic floor dysfunction    • Pelvic pain    • Scoliosis 2008   • Vaginitis        Allergies   Allergen Reactions   • Penicillins Hives   • Methocarbamol Itching       Past Surgical History:   Procedure Laterality Date   • ANAL FISSURECTOMY     • BLADDER SUSPENSION     • COLONOSCOPY  [id number]?    Clear   • EYE SURGERY  2012    Cataract removal   • RESECTION OF NASAL TURBINATES     • SINUS SURGERY  1960 nimo    Turbinate shrinkage   • SPINE SURGERY  2022    Laminectomy L2L3   • WISDOM TOOTH EXTRACTION         Family History   Problem Relation Age of Onset   • Stroke Mother    • Arthritis Mother    • Mental illness Mother    • Osteoporosis Mother    • Alcohol abuse Mother         Kwabena brother   • Depression Mother    • Alzheimer's disease  Father    • Hyperlipidemia Father    • Asthma Brother    • Heart failure Brother    • Hypertension Brother    • Mental illness Brother    • Cancer Brother    • Heart attack Brother    • Liver disease Brother    • Alcohol abuse Brother    • Anxiety disorder Brother    • COPD Brother    • Depression Brother    • Heart disease Brother    • Alcohol abuse Brother    • Breast cancer Neg Hx    • Ovarian cancer Neg Hx        Social History     Socioeconomic History   • Marital status:    Tobacco Use   • Smoking status: Never   • Smokeless tobacco: Never   Vaping Use   • Vaping status: Never Used   Substance and Sexual Activity   • Alcohol use: Yes     Alcohol/week: 1.0 standard drink of alcohol     Types: 1 Drinks containing 0.5 oz of alcohol per week     Comment: 1 or less a week   • Drug use: Never   • Sexual activity: Not Currently     Birth control/protection: None           Objective   Physical Exam  Vitals and nursing note reviewed.   Constitutional:       Appearance: Normal appearance. She is well-developed. She is not diaphoretic.      Comments: Nontoxic-appearing female   HENT:      Head: Normocephalic and atraumatic.   Eyes:      Pupils: Pupils are equal, round, and reactive to light.   Neck:      Comments: No midline cervical spine tenderness noted, no step-off or deformity present  Cardiovascular:      Rate and Rhythm: Normal rate and regular rhythm.      Heart sounds: Normal heart sounds. No murmur heard.     No friction rub. No gallop.   Pulmonary:      Effort: Pulmonary effort is normal. No respiratory distress.      Breath sounds: Normal breath sounds. No wheezing or rales.   Musculoskeletal:      Comments: Range of motion of left elbow is limited secondary to pain, left upper extremity is held in flexed position of comfort with obvious closed deformity and soft tissue swelling noted to posterior left elbow   Skin:     General: Skin is warm and dry.      Findings: No erythema or rash.   Neurological:       Mental Status: She is alert and oriented to person, place, and time.      Comments: Left upper extremity is neurovascularly intact distally with bounding distal pulses and normal sensation noted   Psychiatric:         Mood and Affect: Mood normal.         Thought Content: Thought content normal.         Judgment: Judgment normal.         Procedural Sedation    Date/Time: 1/20/2025 7:27 PM    Performed by: Osvaldo Small MD  Authorized by: Osvaldo Small MD    Consent:     Consent obtained:  Verbal and written    Consent given by:  Patient    Risks, benefits, and alternatives were discussed: yes      Risks discussed:  Allergic reaction, dysrhythmia, inadequate sedation, nausea, vomiting, respiratory compromise necessitating ventilatory assistance and intubation and prolonged hypoxia resulting in organ damage  Universal protocol:     Procedure explained and questions answered to patient or proxy's satisfaction: yes      Relevant documents present and verified: yes      Imaging studies available: yes      Site/side marked: yes      Immediately prior to procedure, a time out was called: yes      Patient identity confirmed:  Verbally with patient and arm band  Indications:     Procedure performed:  Dislocation reduction    Procedure necessitating sedation performed by:  Physician performing sedation    Intended level of sedation:  Moderate  Pre-sedation assessment:     ASA classification: class 2 - patient with mild systemic disease      Mallampati score:  II - soft palate, uvula, fauces visible    Neck mobility: normal      Pre-sedation assessments completed and reviewed: airway patency, anesthesia/sedation history, cardiovascular function, hydration status, mental status, nausea/vomiting, pain level, respiratory function and temperature      Pre-sedation assessment completed:  1/20/2025 4:02 PM  Immediate pre-procedure details:     Reassessment: Patient reassessed immediately prior to procedure       Reviewed: vital signs, relevant labs/tests and NPO status      Verified: bag valve mask available, emergency equipment available, intubation equipment available, IV patency confirmed and oxygen available    Procedure details (see MAR for exact dosages):     Sedation start time:  1/20/2025 4:12 PM    Preoxygenation:  Nonrebreather mask    Sedation:  Propofol    Intra-procedure monitoring:  Blood pressure monitoring, cardiac monitor, continuous pulse oximetry, continuous capnometry, frequent LOC assessments and frequent vital sign checks    Intra-procedure events: none      Sedation end time:  1/20/2025 4:29 PM    Total sedation time (minutes):  17  Post-procedure details:     Post-sedation assessment completed:  1/20/2025 4:44 PM    Attendance: Constant attendance by certified staff until patient recovered      Recovery: Patient returned to pre-procedure baseline      Estimated blood loss (see I/O flowsheets): no      Complications:  N/a    Post-sedation assessments completed and reviewed: airway patency, cardiovascular function, hydration status, mental status, nausea/vomiting, pain level and respiratory function      Specimens recovered:  None    Patient is stable for discharge or admission: yes      Procedure completion:  Tolerated well, no immediate complications  FX Dislocation    Date/Time: 1/20/2025 7:29 PM    Performed by: Osvaldo Small MD  Authorized by: Osvaldo Small MD    Consent:     Consent obtained:  Verbal and written    Consent given by:  Patient    Risks, benefits, and alternatives were discussed: yes      Risks discussed:  Nerve damage, vascular damage and pain  Universal protocol:     Procedure explained and questions answered to patient or proxy's satisfaction: yes      Relevant documents present and verified: yes      Imaging studies available: yes      Site/side marked: yes      Immediately prior to procedure, a time out was called: yes      Patient identity confirmed:  Verbally  with patient and arm band  Injury:     Injury location:  Elbow    Elbow injury location:  R elbow    Elbow fracture type comment:  Posterior L elbow dislocation  Pre-procedure details:     Distal neurologic exam:  Normal    Distal perfusion: distal pulses strong and brisk capillary refill      Range of motion: reduced    Sedation:     Sedation type:  Moderate sedation  Anesthesia:     Anesthesia method:  None  Procedure details:     Manipulation performed: yes      Reduction successful: yes      X-ray confirmed reduction: yes      Immobilization:  Splint    Splint type:  Long arm    Supplies used:  Cotton padding and fiberglass    Attestation: Splint applied and adjusted personally by me    Post-procedure details:     Distal neurologic exam:  Normal    Distal perfusion: distal pulses strong and brisk capillary refill      Range of motion: not applicable      Procedure completion:  Tolerated well, no immediate complications             ED Course  ED Course as of 01/20/25 1931 Mon Jan 20, 2025   4943 79-year-old female presents for evaluation of left elbow injury.  Just prior to coming to the emergency department today, the patient was walking out of [employer] after a workout when she had a mechanical trip and fall and fell awkwardly onto her left elbow.  She is left-handed.  She suffered immediate pain to her elbow.  She did not hit her head or lose consciousness.  No neck pain.  She is not anticoagulated.  EMS was called to the scene and she was brought here to be evaluated.  On arrival, the patient is complaining of pain to her entire left upper extremity but is primarily complaining of pain to her left elbow.  Left upper extremity is held in a flexed position of comfort.  She has an obvious deformity noted to her left posterior elbow that is closed.  Left upper extremity is neurovascularly intact distally with bounding distal pulses normal sensation noted.  Brought differential diagnosis.  We will obtain  plain films, provide pain control, and we will reassess following initial interventions. [DD]   1554 I personally and independently viewed the patient's x-ray images myself, and I am in agreement with the radiologist's reading for final interpretation, particularly regarding left posterior elbow dislocation/fracture. [DD]   1819 After obtaining informed consent, using propofol for sedation, the patient's elbow dislocation was reduced without complication.  She tolerated the procedure well.  Posterior splint applied.  Sling applied.  Neurovascularly intact following splint placement. [DD]   1819 The patient recovered well from the procedure.  I feel that she can be safely discharged and managed on an outpatient basis at this point.  Prescription for Norco as needed.  She will follow-up with Dr. Miller of orthopedics within the next week.  Agreeable with plan and given appropriate strict return precautions. [DD]      ED Course User Index  [DD] Osvaldo Small MD                                           No results found for this or any previous visit (from the past 24 hours).  Note: In addition to lab results from this visit, the labs listed above may include labs taken at another facility or during a different encounter within the last 24 hours. Please correlate lab times with ED admission and discharge times for further clarification of the services performed during this visit.    XR Elbow 3+ View Left   Final Result   Impression:   1.Successful reduction of elbow joint.   2.Improved alignment of distal radial fracture fragment.            Electronically Signed: Osvaldo Gardiner MD     1/20/2025 5:01 PM EST     Workstation ID: [id number]      XR Wrist 3+ View Left   Final Result   Impression:   1. Posterior elbow dislocation. Acute probably comminuted radial neck fracture with completely dislocated/displaced radial head no longer articulating with the capitellum.   2. No other fractures involving the left shoulder,  more proximal humerus, more distal forearm or wrist.         Electronically Signed: Lester Bledsoe MD     1/20/2025 3:36 PM EST     Workstation ID: [id number]      XR Shoulder 2+ View Left   Final Result   Impression:   1. Posterior elbow dislocation. Acute probably comminuted radial neck fracture with completely dislocated/displaced radial head no longer articulating with the capitellum.   2. No other fractures involving the left shoulder, more proximal humerus, more distal forearm or wrist.         Electronically Signed: Lester Bledsoe MD     1/20/2025 3:36 PM EST     Workstation ID: [id number]      XR Humerus Left   Final Result   Impression:   1. Posterior elbow dislocation. Acute probably comminuted radial neck fracture with completely dislocated/displaced radial head no longer articulating with the capitellum.   2. No other fractures involving the left shoulder, more proximal humerus, more distal forearm or wrist.         Electronically Signed: Lester Bledsoe MD     1/20/2025 3:36 PM EST     Workstation ID: [id number]      XR Forearm 2 View Left   Final Result   Impression:   1. Posterior elbow dislocation. Acute probably comminuted radial neck fracture with completely dislocated/displaced radial head no longer articulating with the capitellum.   2. No other fractures involving the left shoulder, more proximal humerus, more distal forearm or wrist.         Electronically Signed: Lester Bledsoe MD     1/20/2025 3:36 PM EST     Workstation ID: [id number]      XR Elbow 3+ View Left   Final Result   Impression:   1. Posterior elbow dislocation. Acute probably comminuted radial neck fracture with completely dislocated/displaced radial head no longer articulating with the capitellum.   2. No other fractures involving the left shoulder, more proximal humerus, more distal forearm or wrist.         Electronically Signed: Lester Bledsoe MD     1/20/2025 3:36 PM EST     Workstation ID: [id number]        Vitals:    01/20/25  1645 01/20/25 1700 01/20/25 1715 01/20/25 1718   BP: 130/57 106/60 120/69    BP Location:       Patient Position:       Pulse: 86 84 89 89   Resp:       Temp:       TempSrc:       SpO2: 96% 94% 91% 92%   Weight:       Height:         Medications   sodium chloride 0.9 % flush 10 mL (has no administration in time range)   Propofol (DIPRIVAN) injection 150 mg (150 mg Intravenous Not Given 1/20/25 1630)   HYDROmorphone (DILAUDID) injection 0.5 mg (0.5 mg Intravenous Given 1/20/25 1443)   ondansetron (ZOFRAN) injection 4 mg (4 mg Intravenous Given 1/20/25 1443)   HYDROcodone-acetaminophen (NORCO) 5-325 MG per tablet 1 tablet (1 tablet Oral Given 1/20/25 1723)     ECG/EMG Results (last 24 hours)       ** No results found for the last 24 hours. **          No orders to display           THUAN reviewed by Osvaldo Small MD       Medical Decision Making      Final diagnoses:   Closed dislocation of left elbow, initial encounter       ED Disposition  ED Disposition       ED Disposition   Discharge    Condition   Stable    Comment   --               Kwabena Miller MD  [address] [address]  [phone]    In 1 week           Medication List        New Prescriptions      HYDROcodone-acetaminophen 5-325 MG per tablet  Commonly known as: NORCO  Take 1 tablet by mouth Every 6 (Six) Hours As Needed for Moderate Pain.               Where to Get Your Medications        These medications were sent to [facility] [id number] - [place], [place] - [address] AT [facility] [address] - [phone] PH - [phone] FX  [address] [place] [address]      Phone: [phone]   HYDROcodone-acetaminophen 5-325 MG per tablet            Osvaldo Small MD  01/20/25 8807

## 2025-01-21 NOTE — OUTREACH NOTE
SW sent [facility] survey via email.  Lorraine HALLMAN -   Ambulatory Case Management    1/21/2025, 10:36 EST

## 2025-01-23 ENCOUNTER — HOSPITAL ENCOUNTER (OUTPATIENT)
Dept: CARDIOLOGY | Facility: HOSPITAL | Age: 80
Discharge: HOME | End: 2025-01-23
Payer: MEDICARE

## 2025-01-23 ENCOUNTER — TRANSCRIBE ORDERS (OUTPATIENT)
Dept: LAB | Facility: HOSPITAL | Age: 80
End: 2025-01-23
Payer: MEDICARE

## 2025-01-23 ENCOUNTER — TRANSCRIBE ORDERS (OUTPATIENT)
Dept: CARDIOLOGY | Facility: HOSPITAL | Age: 80
End: 2025-01-23
Payer: MEDICARE

## 2025-01-23 ENCOUNTER — LAB (OUTPATIENT)
Dept: LAB | Facility: HOSPITAL | Age: 80
End: 2025-01-23
Payer: MEDICARE

## 2025-01-23 DIAGNOSIS — Z01.818 OTHER SPECIFIED PRE-OPERATIVE EXAMINATION: Primary | ICD-10-CM

## 2025-01-23 DIAGNOSIS — Z01.818 OTHER SPECIFIED PRE-OPERATIVE EXAMINATION: ICD-10-CM

## 2025-01-23 DIAGNOSIS — Z87.898 PERSONAL HISTORY OF OTHER LYMPHATIC AND HEMATOPOIETIC NEOPLASM: ICD-10-CM

## 2025-01-23 DIAGNOSIS — R73.09 IMPAIRED GLUCOSE TOLERANCE TEST: ICD-10-CM

## 2025-01-23 DIAGNOSIS — Z87.898 PERSONAL HISTORY OF OTHER LYMPHATIC AND HEMATOPOIETIC NEOPLASM: Primary | ICD-10-CM

## 2025-01-23 LAB
ANION GAP SERPL CALCULATED.3IONS-SCNC: 7 MMOL/L (ref 5–15)
BUN SERPL-MCNC: 19 MG/DL (ref 8–23)
BUN/CREAT SERPL: 25 (ref 7–25)
CALCIUM SPEC-SCNC: 9.4 MG/DL (ref 8.6–10.5)
CHLORIDE SERPL-SCNC: 101 MMOL/L (ref 98–107)
CO2 SERPL-SCNC: 31 MMOL/L (ref 22–29)
CREAT SERPL-MCNC: 0.76 MG/DL (ref 0.57–1)
DEPRECATED RDW RBC AUTO: 47.1 FL (ref 37–54)
EGFRCR SERPLBLD CKD-EPI 2021: 80.8 ML/MIN/1.73
ERYTHROCYTE [DISTWIDTH] IN BLOOD BY AUTOMATED COUNT: 12.9 % (ref 12.3–15.4)
GLUCOSE SERPL-MCNC: 93 MG/DL (ref 65–99)
HBA1C MFR BLD: 5.7 % (ref 4.8–5.6)
HCT VFR BLD AUTO: 38.6 % (ref 34–46.6)
HGB BLD-MCNC: 12.3 G/DL (ref 12–15.9)
MCH RBC QN AUTO: 31.9 PG (ref 26.6–33)
MCHC RBC AUTO-ENTMCNC: 31.9 G/DL (ref 31.5–35.7)
MCV RBC AUTO: 100 FL (ref 79–97)
PLATELET # BLD AUTO: 231 10*3/MM3 (ref 140–450)
PMV BLD AUTO: 10.7 FL (ref 6–12)
POTASSIUM SERPL-SCNC: 4.5 MMOL/L (ref 3.5–5.2)
QT INTERVAL: 316 MS
QTC INTERVAL: 405 MS
RBC # BLD AUTO: 3.86 10*6/MM3 (ref 3.77–5.28)
SODIUM SERPL-SCNC: 139 MMOL/L (ref 136–145)
WBC NRBC COR # BLD AUTO: 8.39 10*3/MM3 (ref 3.4–10.8)

## 2025-01-23 PROCEDURE — 36415 COLL VENOUS BLD VENIPUNCTURE: CPT

## 2025-01-23 PROCEDURE — 80048 BASIC METABOLIC PNL TOTAL CA: CPT

## 2025-01-23 PROCEDURE — 93005 ELECTROCARDIOGRAM TRACING: CPT | Performed by: ORTHOPAEDIC SURGERY

## 2025-01-23 PROCEDURE — 85027 COMPLETE CBC AUTOMATED: CPT

## 2025-01-23 PROCEDURE — 83036 HEMOGLOBIN GLYCOSYLATED A1C: CPT

## 2025-01-26 ENCOUNTER — ANESTHESIA EVENT (OUTPATIENT)
Dept: PERIOP | Facility: HOSPITAL | Age: 80
End: 2025-01-26
Payer: MEDICARE

## 2025-01-26 RX ORDER — SODIUM CHLORIDE 0.9 % (FLUSH) 0.9 %
10 SYRINGE (ML) INJECTION AS NEEDED
Status: CANCELLED | OUTPATIENT
Start: 2025-01-26

## 2025-01-26 RX ORDER — FAMOTIDINE 10 MG/ML
20 INJECTION, SOLUTION INTRAVENOUS ONCE
Status: CANCELLED | OUTPATIENT
Start: 2025-01-26 | End: 2025-01-26

## 2025-01-26 RX ORDER — SODIUM CHLORIDE 0.9 % (FLUSH) 0.9 %
10 SYRINGE (ML) INJECTION EVERY 12 HOURS SCHEDULED
Status: CANCELLED | OUTPATIENT
Start: 2025-01-26

## 2025-01-27 ENCOUNTER — ANESTHESIA (OUTPATIENT)
Dept: PERIOP | Facility: HOSPITAL | Age: 80
End: 2025-01-27
Payer: MEDICARE

## 2025-01-27 ENCOUNTER — HOSPITAL ENCOUNTER (OUTPATIENT)
Facility: HOSPITAL | Age: 80
Discharge: HOME | End: 2025-01-28
Attending: ORTHOPAEDIC SURGERY | Admitting: ORTHOPAEDIC SURGERY
Payer: MEDICARE

## 2025-01-27 ENCOUNTER — APPOINTMENT (OUTPATIENT)
Dept: GENERAL RADIOLOGY | Facility: HOSPITAL | Age: 80
End: 2025-01-27
Payer: MEDICARE

## 2025-01-27 ENCOUNTER — ANESTHESIA EVENT CONVERTED (OUTPATIENT)
Dept: ANESTHESIOLOGY | Facility: HOSPITAL | Age: 80
End: 2025-01-27
Payer: MEDICARE

## 2025-01-27 DIAGNOSIS — M51.16 LUMBAR DISC HERNIATION WITH RADICULOPATHY: ICD-10-CM

## 2025-01-27 DIAGNOSIS — N81.4 CYSTOCELE WITH PROLAPSE: ICD-10-CM

## 2025-01-27 DIAGNOSIS — N39.0 RECURRENT UTI: ICD-10-CM

## 2025-01-27 DIAGNOSIS — M62.89 PFD (PELVIC FLOOR DYSFUNCTION): ICD-10-CM

## 2025-01-27 DIAGNOSIS — N94.819 VULVODYNIA: ICD-10-CM

## 2025-01-27 DIAGNOSIS — G47.33 OSA (OBSTRUCTIVE SLEEP APNEA): ICD-10-CM

## 2025-01-27 DIAGNOSIS — R53.83 OTHER FATIGUE: ICD-10-CM

## 2025-01-27 DIAGNOSIS — G58.8 PUDENDAL NEURALGIA: ICD-10-CM

## 2025-01-27 DIAGNOSIS — Z98.890 HISTORY OF EPISIOTOMY: ICD-10-CM

## 2025-01-27 DIAGNOSIS — G43.909 NONINTRACTABLE CHRONIC MIGRAINE: Primary | ICD-10-CM

## 2025-01-27 DIAGNOSIS — N95.2 VAGINAL ATROPHY: ICD-10-CM

## 2025-01-27 DIAGNOSIS — E53.8 B12 DEFICIENCY: ICD-10-CM

## 2025-01-27 DIAGNOSIS — M79.10 TRIGGER POINT: ICD-10-CM

## 2025-01-27 DIAGNOSIS — R44.8 SENSATION OF PRESSURE IN FACE: ICD-10-CM

## 2025-01-27 DIAGNOSIS — E61.1 IRON DEFICIENCY: ICD-10-CM

## 2025-01-27 DIAGNOSIS — M46.1 SACROILIITIS: ICD-10-CM

## 2025-01-27 DIAGNOSIS — G47.09 OTHER INSOMNIA: ICD-10-CM

## 2025-01-27 DIAGNOSIS — G24.01 TARDIVE DYSKINESIA: ICD-10-CM

## 2025-01-27 DIAGNOSIS — R25.2 SPASM: ICD-10-CM

## 2025-01-27 DIAGNOSIS — J30.89 SEASONAL ALLERGIC RHINITIS DUE TO OTHER ALLERGIC TRIGGER: ICD-10-CM

## 2025-01-27 DIAGNOSIS — R39.15 URINARY URGENCY: ICD-10-CM

## 2025-01-27 DIAGNOSIS — M81.0 MENOPAUSAL OSTEOPOROSIS: ICD-10-CM

## 2025-01-27 DIAGNOSIS — M48.061 SPINAL STENOSIS OF LUMBAR REGION WITHOUT NEUROGENIC CLAUDICATION: ICD-10-CM

## 2025-01-27 DIAGNOSIS — R10.2 PERINEUM PAIN, FEMALE: ICD-10-CM

## 2025-01-27 DIAGNOSIS — E78.5 DYSLIPIDEMIA: ICD-10-CM

## 2025-01-27 DIAGNOSIS — J30.0 NONALLERGIC VASOMOTOR RHINITIS: ICD-10-CM

## 2025-01-27 DIAGNOSIS — R41.3 MEMORY LOSS: ICD-10-CM

## 2025-01-27 DIAGNOSIS — N32.81 OAB (OVERACTIVE BLADDER): ICD-10-CM

## 2025-01-27 DIAGNOSIS — R35.0 INCREASED FREQUENCY OF URINATION: ICD-10-CM

## 2025-01-27 DIAGNOSIS — R51.9 RECURRENT HEADACHE: ICD-10-CM

## 2025-01-27 DIAGNOSIS — M79.18 MYOFASCIAL PAIN: ICD-10-CM

## 2025-01-27 DIAGNOSIS — N32.81 URGENCY-FREQUENCY SYNDROME: ICD-10-CM

## 2025-01-27 PROBLEM — S52.122A FRACTURE OF RADIAL HEAD, LEFT, CLOSED: Status: ACTIVE | Noted: 2025-01-27

## 2025-01-27 PROCEDURE — 25010000002 BUPIVACAINE (PF) 0.25 % SOLUTION: Performed by: NURSE ANESTHETIST, CERTIFIED REGISTERED

## 2025-01-27 PROCEDURE — 25010000002 ONDANSETRON PER 1 MG: Performed by: ANESTHESIOLOGY

## 2025-01-27 PROCEDURE — 25010000002 ROPIVACAINE HCL-NACL 0.2-0.9 % SOLUTION: Performed by: NURSE ANESTHETIST, CERTIFIED REGISTERED

## 2025-01-27 PROCEDURE — 25010000002 PHENYLEPHRINE 10 MG/ML SOLUTION: Performed by: ANESTHESIOLOGY

## 2025-01-27 PROCEDURE — 25010000002 LIDOCAINE PF 1% 1 % SOLUTION: Performed by: ANESTHESIOLOGY

## 2025-01-27 PROCEDURE — 63710000001 ACETAMINOPHEN 325 MG TABLET: Performed by: INTERNAL MEDICINE

## 2025-01-27 PROCEDURE — C1776 JOINT DEVICE (IMPLANTABLE): HCPCS | Performed by: ORTHOPAEDIC SURGERY

## 2025-01-27 PROCEDURE — 25010000002 FENTANYL CITRATE (PF) 50 MCG/ML SOLUTION: Performed by: NURSE ANESTHETIST, CERTIFIED REGISTERED

## 2025-01-27 PROCEDURE — 25010000002 CEFAZOLIN PER 500 MG: Performed by: ORTHOPAEDIC SURGERY

## 2025-01-27 PROCEDURE — 25810000003 LACTATED RINGERS PER 1000 ML: Performed by: ANESTHESIOLOGY

## 2025-01-27 PROCEDURE — 25010000002 PROPOFOL 10 MG/ML EMULSION: Performed by: ANESTHESIOLOGY

## 2025-01-27 PROCEDURE — 25010000002 DEXAMETHASONE PER 1 MG: Performed by: ANESTHESIOLOGY

## 2025-01-27 PROCEDURE — C1713 ANCHOR/SCREW BN/BN,TIS/BN: HCPCS | Performed by: ORTHOPAEDIC SURGERY

## 2025-01-27 PROCEDURE — 76000 FLUOROSCOPY <1 HR PHYS/QHP: CPT

## 2025-01-27 PROCEDURE — A9270 NON-COVERED ITEM OR SERVICE: HCPCS | Performed by: INTERNAL MEDICINE

## 2025-01-27 DEVICE — FW,BPB #2 SUTR,BLU W/NDL
Type: IMPLANTABLE DEVICE | Site: ELBOW | Status: FUNCTIONAL
Brand: ARTHREX®

## 2025-01-27 DEVICE — SUT/ANCH GRYPHON/P BR W/DYNACORD: Type: IMPLANTABLE DEVICE | Site: ELBOW | Status: FUNCTIONAL

## 2025-01-27 DEVICE — IMPLANTABLE DEVICE
Type: IMPLANTABLE DEVICE | Site: ELBOW | Status: FUNCTIONAL
Brand: ALIGN

## 2025-01-27 DEVICE — ALIGN RADIAL HEAD & LOCK SCREW, 18MM
Type: IMPLANTABLE DEVICE | Site: ELBOW | Status: FUNCTIONAL
Brand: ALIGN

## 2025-01-27 RX ORDER — EPHEDRINE SULFATE 50 MG/ML
INJECTION, SOLUTION INTRAVENOUS AS NEEDED
Status: DISCONTINUED | OUTPATIENT
Start: 2025-01-27 | End: 2025-01-27 | Stop reason: SURG

## 2025-01-27 RX ORDER — ONDANSETRON 2 MG/ML
INJECTION INTRAMUSCULAR; INTRAVENOUS AS NEEDED
Status: DISCONTINUED | OUTPATIENT
Start: 2025-01-27 | End: 2025-01-27 | Stop reason: SURG

## 2025-01-27 RX ORDER — MIDAZOLAM HYDROCHLORIDE 1 MG/ML
0.5 INJECTION, SOLUTION INTRAMUSCULAR; INTRAVENOUS
Status: DISCONTINUED | OUTPATIENT
Start: 2025-01-27 | End: 2025-01-27 | Stop reason: HOSPADM

## 2025-01-27 RX ORDER — PANTOPRAZOLE SODIUM 40 MG/1
40 TABLET, DELAYED RELEASE ORAL DAILY
Status: DISCONTINUED | OUTPATIENT
Start: 2025-01-28 | End: 2025-01-28 | Stop reason: HOSPADM

## 2025-01-27 RX ORDER — FENTANYL CITRATE 50 UG/ML
50 INJECTION, SOLUTION INTRAMUSCULAR; INTRAVENOUS
Status: DISCONTINUED | OUTPATIENT
Start: 2025-01-27 | End: 2025-01-27 | Stop reason: HOSPADM

## 2025-01-27 RX ORDER — MAGNESIUM HYDROXIDE 1200 MG/15ML
LIQUID ORAL AS NEEDED
Status: DISCONTINUED | OUTPATIENT
Start: 2025-01-27 | End: 2025-01-27 | Stop reason: HOSPADM

## 2025-01-27 RX ORDER — LIDOCAINE HYDROCHLORIDE 10 MG/ML
0.5 INJECTION, SOLUTION EPIDURAL; INFILTRATION; INTRACAUDAL; PERINEURAL ONCE AS NEEDED
Status: COMPLETED | OUTPATIENT
Start: 2025-01-27 | End: 2025-01-27

## 2025-01-27 RX ORDER — PHENYLEPHRINE HYDROCHLORIDE 10 MG/ML
INJECTION INTRAVENOUS AS NEEDED
Status: DISCONTINUED | OUTPATIENT
Start: 2025-01-27 | End: 2025-01-27 | Stop reason: SURG

## 2025-01-27 RX ORDER — ACETAMINOPHEN 325 MG/1
650 TABLET ORAL EVERY 4 HOURS PRN
Status: DISCONTINUED | OUTPATIENT
Start: 2025-01-27 | End: 2025-01-28 | Stop reason: HOSPADM

## 2025-01-27 RX ORDER — SODIUM CHLORIDE, SODIUM LACTATE, POTASSIUM CHLORIDE, CALCIUM CHLORIDE 600; 310; 30; 20 MG/100ML; MG/100ML; MG/100ML; MG/100ML
9 INJECTION, SOLUTION INTRAVENOUS CONTINUOUS
Status: ACTIVE | OUTPATIENT
Start: 2025-01-28 | End: 2025-01-28

## 2025-01-27 RX ORDER — SODIUM CHLORIDE 0.9 % (FLUSH) 0.9 %
10 SYRINGE (ML) INJECTION EVERY 12 HOURS SCHEDULED
Status: DISCONTINUED | OUTPATIENT
Start: 2025-01-27 | End: 2025-01-28 | Stop reason: HOSPADM

## 2025-01-27 RX ORDER — ROPIVACAINE HYDROCHLORIDE 2 MG/ML
INJECTION, SOLUTION EPIDURAL; INFILTRATION; PERINEURAL CONTINUOUS
Status: DISCONTINUED | OUTPATIENT
Start: 2025-01-27 | End: 2025-01-28 | Stop reason: HOSPADM

## 2025-01-27 RX ORDER — OXYCODONE HYDROCHLORIDE 5 MG/1
5 TABLET ORAL EVERY 6 HOURS PRN
Status: DISCONTINUED | OUTPATIENT
Start: 2025-01-27 | End: 2025-01-28 | Stop reason: HOSPADM

## 2025-01-27 RX ORDER — PROPOFOL 10 MG/ML
VIAL (ML) INTRAVENOUS AS NEEDED
Status: DISCONTINUED | OUTPATIENT
Start: 2025-01-27 | End: 2025-01-27 | Stop reason: SURG

## 2025-01-27 RX ORDER — DESVENLAFAXINE 50 MG/1
50 TABLET, FILM COATED, EXTENDED RELEASE ORAL DAILY
Status: DISCONTINUED | OUTPATIENT
Start: 2025-01-28 | End: 2025-01-28 | Stop reason: HOSPADM

## 2025-01-27 RX ORDER — SODIUM CHLORIDE 9 MG/ML
40 INJECTION, SOLUTION INTRAVENOUS AS NEEDED
Status: DISCONTINUED | OUTPATIENT
Start: 2025-01-27 | End: 2025-01-28 | Stop reason: HOSPADM

## 2025-01-27 RX ORDER — LIDOCAINE HYDROCHLORIDE 10 MG/ML
INJECTION, SOLUTION EPIDURAL; INFILTRATION; INTRACAUDAL; PERINEURAL AS NEEDED
Status: DISCONTINUED | OUTPATIENT
Start: 2025-01-27 | End: 2025-01-27 | Stop reason: SURG

## 2025-01-27 RX ORDER — MEMANTINE HYDROCHLORIDE 10 MG/1
10 TABLET ORAL EVERY 12 HOURS SCHEDULED
Status: DISCONTINUED | OUTPATIENT
Start: 2025-01-27 | End: 2025-01-28 | Stop reason: HOSPADM

## 2025-01-27 RX ORDER — FAMOTIDINE 20 MG/1
20 TABLET, FILM COATED ORAL ONCE
Status: COMPLETED | OUTPATIENT
Start: 2025-01-27 | End: 2025-01-27

## 2025-01-27 RX ORDER — ONDANSETRON 2 MG/ML
4 INJECTION INTRAMUSCULAR; INTRAVENOUS ONCE AS NEEDED
Status: DISCONTINUED | OUTPATIENT
Start: 2025-01-27 | End: 2025-01-27 | Stop reason: HOSPADM

## 2025-01-27 RX ORDER — BUPIVACAINE HYDROCHLORIDE 2.5 MG/ML
INJECTION, SOLUTION EPIDURAL; INFILTRATION; INTRACAUDAL
Status: COMPLETED | OUTPATIENT
Start: 2025-01-27 | End: 2025-01-27

## 2025-01-27 RX ORDER — DEXAMETHASONE SODIUM PHOSPHATE 4 MG/ML
INJECTION, SOLUTION INTRA-ARTICULAR; INTRALESIONAL; INTRAMUSCULAR; INTRAVENOUS; SOFT TISSUE AS NEEDED
Status: DISCONTINUED | OUTPATIENT
Start: 2025-01-27 | End: 2025-01-27 | Stop reason: SURG

## 2025-01-27 RX ORDER — BUPROPION HYDROCHLORIDE 150 MG/1
300 TABLET ORAL DAILY
Status: DISCONTINUED | OUTPATIENT
Start: 2025-01-28 | End: 2025-01-28 | Stop reason: HOSPADM

## 2025-01-27 RX ORDER — NALOXONE HCL 0.4 MG/ML
0.1 VIAL (ML) INJECTION
Status: DISCONTINUED | OUTPATIENT
Start: 2025-01-27 | End: 2025-01-28 | Stop reason: HOSPADM

## 2025-01-27 RX ORDER — HYDROMORPHONE HYDROCHLORIDE 1 MG/ML
0.5 INJECTION, SOLUTION INTRAMUSCULAR; INTRAVENOUS; SUBCUTANEOUS
Status: DISCONTINUED | OUTPATIENT
Start: 2025-01-27 | End: 2025-01-27 | Stop reason: HOSPADM

## 2025-01-27 RX ORDER — SODIUM CHLORIDE 0.9 % (FLUSH) 0.9 %
10 SYRINGE (ML) INJECTION AS NEEDED
Status: DISCONTINUED | OUTPATIENT
Start: 2025-01-27 | End: 2025-01-28 | Stop reason: HOSPADM

## 2025-01-27 RX ORDER — FENTANYL CITRATE 50 UG/ML
INJECTION, SOLUTION INTRAMUSCULAR; INTRAVENOUS
Status: COMPLETED | OUTPATIENT
Start: 2025-01-27 | End: 2025-01-27

## 2025-01-27 RX ORDER — LABETALOL HYDROCHLORIDE 5 MG/ML
10 INJECTION, SOLUTION INTRAVENOUS EVERY 4 HOURS PRN
Status: DISCONTINUED | OUTPATIENT
Start: 2025-01-27 | End: 2025-01-28 | Stop reason: HOSPADM

## 2025-01-27 RX ORDER — HYDROMORPHONE HYDROCHLORIDE 1 MG/ML
0.5 INJECTION, SOLUTION INTRAMUSCULAR; INTRAVENOUS; SUBCUTANEOUS
Status: DISCONTINUED | OUTPATIENT
Start: 2025-01-27 | End: 2025-01-28 | Stop reason: HOSPADM

## 2025-01-27 RX ADMIN — FENTANYL CITRATE 100 MCG: 50 INJECTION, SOLUTION INTRAMUSCULAR; INTRAVENOUS at 15:27

## 2025-01-27 RX ADMIN — PHENYLEPHRINE HYDROCHLORIDE 100 MCG: 10 INJECTION INTRAVENOUS at 19:31

## 2025-01-27 RX ADMIN — DEXAMETHASONE SODIUM PHOSPHATE 4 MG: 4 INJECTION INTRA-ARTICULAR; INTRALESIONAL; INTRAMUSCULAR; INTRAVENOUS; SOFT TISSUE at 19:05

## 2025-01-27 RX ADMIN — PHENYLEPHRINE HYDROCHLORIDE 100 MCG: 10 INJECTION INTRAVENOUS at 19:15

## 2025-01-27 RX ADMIN — PHENYLEPHRINE HYDROCHLORIDE 100 MCG: 10 INJECTION INTRAVENOUS at 19:21

## 2025-01-27 RX ADMIN — SODIUM CHLORIDE, SODIUM LACTATE, POTASSIUM CHLORIDE, CALCIUM CHLORIDE: 20; 30; 600; 310 INJECTION, SOLUTION INTRAVENOUS at 19:05

## 2025-01-27 RX ADMIN — BUPIVACAINE HYDROCHLORIDE 30 ML: 2.5 INJECTION, SOLUTION EPIDURAL; INFILTRATION; INTRACAUDAL; PERINEURAL at 15:27

## 2025-01-27 RX ADMIN — PROPOFOL 150 MG: 10 INJECTION, EMULSION INTRAVENOUS at 19:05

## 2025-01-27 RX ADMIN — SODIUM CHLORIDE, SODIUM LACTATE, POTASSIUM CHLORIDE, CALCIUM CHLORIDE 9 ML/HR: 20; 30; 600; 310 INJECTION, SOLUTION INTRAVENOUS at 15:22

## 2025-01-27 RX ADMIN — FAMOTIDINE 20 MG: 20 TABLET, FILM COATED ORAL at 15:21

## 2025-01-27 RX ADMIN — EPHEDRINE SULFATE 10 MG: 50 INJECTION INTRAVENOUS at 19:47

## 2025-01-27 RX ADMIN — ONDANSETRON 4 MG: 2 INJECTION INTRAMUSCULAR; INTRAVENOUS at 20:26

## 2025-01-27 RX ADMIN — Medication 1000 MG: at 21:00

## 2025-01-27 RX ADMIN — LIDOCAINE HYDROCHLORIDE 50 MG: 10 INJECTION, SOLUTION EPIDURAL; INFILTRATION; INTRACAUDAL; PERINEURAL at 19:05

## 2025-01-27 RX ADMIN — PHENYLEPHRINE HYDROCHLORIDE 300 MCG: 10 INJECTION INTRAVENOUS at 19:46

## 2025-01-27 RX ADMIN — LIDOCAINE HYDROCHLORIDE 0.5 ML: 10 INJECTION, SOLUTION EPIDURAL; INFILTRATION; INTRACAUDAL; PERINEURAL at 15:17

## 2025-01-27 RX ADMIN — SODIUM CHLORIDE 2000 MG: 900 INJECTION INTRAVENOUS at 19:11

## 2025-01-27 RX ADMIN — ACETAMINOPHEN 650 MG: 325 TABLET ORAL at 23:22

## 2025-01-27 NOTE — ANESTHESIA PREPROCEDURE EVALUATION
Anesthesia Evaluation     Patient summary reviewed and Nursing notes reviewed   no history of anesthetic complications:   NPO Solid Status: > 8 hours  NPO Liquid Status: > 2 hours           Airway   Mallampati: I  TM distance: >3 FB  Neck ROM: full  No difficulty expected  Dental      Pulmonary    (+) ,sleep apnea  Cardiovascular     ECG reviewed    (+) hyperlipidemia      Neuro/Psych  (+) headaches, psychiatric history, dementia  GI/Hepatic/Renal/Endo      Musculoskeletal     (+) neck pain      ROS comment: L posterior elbow dislocation/fracture  Abdominal    Substance History      OB/GYN          Other   arthritis,                   Anesthesia Plan    ASA 3     general     (Ifraclav vs isnb/b  left marked)  intravenous induction     Anesthetic plan, risks, benefits, and alternatives have been provided, discussed and informed consent has been obtained with: patient.    Plan discussed with CRNA.    CODE STATUS:

## 2025-01-27 NOTE — INTERVAL H&P NOTE
[facility] Pre-op    Full history and physical note from office is attached.     VS: /78  HR 92  RR 16  T 98.1 Sat 96%RA    Immunizations:  Influenza:  UTD  Pneumococcal:  UTD  Tetanus:  UTD    Review of Systems:  Constitutional-- No fever, chills or sweats. No fatigue.  CV-- No chest pain, palpitation or syncope  Resp-- No SOB, cough, hemoptysis  Skin--No rashes or lesions    Physical Exam:  Heart:   Regular rate and rhythm, S1 and S2 normal  Lungs: Clear to auscultation bilaterally, respirations unlabored    LAB Results:  Lab Results   Component Value Date    WBC 8.39 01/23/2025    HGB 12.3 01/23/2025    HCT 38.6 01/23/2025    .0 (H) 01/23/2025     01/23/2025    NEUTROABS 3.92 05/01/2024    GLUCOSE 93 01/23/2025    BUN 19 01/23/2025    CREATININE 0.76 01/23/2025    EGFRIFNONA >60 07/18/2022    EGFRIFAFRI >60 07/18/2022     01/23/2025    K 4.5 01/23/2025     01/23/2025    CO2 31.0 (H) 01/23/2025    CALCIUM 9.4 01/23/2025    ALBUMIN 4.4 07/29/2024    AST 23 07/29/2024    ALT 21 07/29/2024    BILITOT 0.4 07/29/2024    PTT 27 10/27/2022    INR 1.0 10/27/2022       Cancer Staging (if applicable)  Cancer Patient: __ yes __no __unknown__N/A; If yes, clinical stage T:__ N:__M:__, stage group or __N/A      Impression: Fracture of left elbow      Plan: LEFT RADIAL HEAD REPAIR; POSSIBLE LATERAL COLLATERAL LIGAMENT REPAIR; OPEN REDUCTION INTERNAL FIXATION CORONOID FRACTURE       Valentina Hollingsworth, REGGIE   1/27/2025   14:51 EST   show

## 2025-01-27 NOTE — ANESTHESIA PROCEDURE NOTES
Peripheral Block      Patient reassessed immediately prior to procedure    Patient location during procedure: pre-op  Reason for block: at surgeon's request and post-op pain management  Performed by  CRNA/CAA: Masoud Gu, CRNA  Assisted by: Essence Diaz RN  Preanesthetic Checklist  Completed: patient identified, IV checked, site marked, risks and benefits discussed, surgical consent, monitors and equipment checked, pre-op evaluation and timeout performed  Prep:  Pt Position: supine  Sterile barriers:cap, gloves, mask and washed/disinfected hands  Prep: ChloraPrep  Patient monitoring: blood pressure monitoring, continuous pulse oximetry and EKG  Procedure    Sedation: yes  Performed under: local infiltration  Guidance:ultrasound guided    ULTRASOUND INTERPRETATION.  Using ultrasound guidance a 20 G gauge needle was placed in close proximity to the brachial plexus nerve, at which point, under ultrasound guidance anesthetic was injected in the area of the nerve and spread of the anesthesia was seen on ultrasound in close proximity thereto.  There were no abnormalities seen on ultrasound; a digital image was taken; and the patient tolerated the procedure with no complications. Images:still images obtained, printed/placed on chart    Laterality:left  Block Type:infraclavicular  Injection Technique:catheter  Needle Type:Tuohy and echogenic  Needle Gauge:18 G  Resistance on Injection: none  Catheter Size:20 G  Cath Depth at skin: 7 cm    Medications Used: fentaNYL citrate (PF) (SUBLIMAZE) injection - Intravenous   100 mcg - 1/27/2025 3:27:00 PM  bupivacaine PF (MARCAINE) 0.25 % injection - Injection   30 mL - 1/27/2025 3:27:00 PM      Post Assessment  Injection Assessment: negative aspiration for heme, no paresthesia on injection and incremental injection  Patient Tolerance:comfortable throughout block  Complications:no  Additional Notes  CATHETER  The affected upper extremity was abducted and rotated 90 degrees at  "the shoulder, within the patients range of motion. A high-frequency linear transducer, with sterile cover, was placed just medial to the coracoid process, distal third of the clavicle, and inferior to the clavicle. Keeping the transducer is in a parasagittal plane (cephalad to caudad), scanning medially to laterally. The Pectoralis major and minor, brachial plexus, axillary artery and vein, rib and pleura where visualized. The insertion site was prepped and draped in sterile fashion. Skin and cutaneous tissue was infiltrated with 2-5 ml of 1% Lidocaine. Using ultrasound-guidance, an 18-gauge Contiplex Ultra 360 Touhy needle was advanced in plane lateral to the axillary artery and lateral cord of the brachial plexus. Preservative-free normal saline was utilized for hydro-dissection of tissue, advancement of Touhy needle, and to confirm final needle placement posterior to the axillary artery and posterior cord of the brachial plexus. Local anesthetic injection spread, in incremental 3-5 ml injections, was confirmed. Aspiration every 5 ml to prevent intravascular injection. Injection was completed with negative aspiration of blood and negative intravascular injection. Injection pressures were normal with minimal resistance. A 20-gauge Contiplex Echo catheter was placed through the needle and advance out the tip of the Touhy 1-3 cm. The Touhy needle was then removed, and final catheter position verified at the 5-6 o'clock position to the axillary artery and posterior cord. The catheter was secured in usual fashion with skin glue, benzoin, steri-strips, CHG tegaderm and Label noting \"Nerve Block Catheter\". Jerk tape applied at yellow connector and catheter connection.    Performed by: Masoud Gu, CRNA            "

## 2025-01-28 ENCOUNTER — TELEPHONE (OUTPATIENT)
Dept: INTERNAL MEDICINE | Facility: CLINIC | Age: 80
End: 2025-01-28
Payer: MEDICARE

## 2025-01-28 VITALS
DIASTOLIC BLOOD PRESSURE: 65 MMHG | TEMPERATURE: 97.7 F | OXYGEN SATURATION: 92 % | WEIGHT: 140 LBS | RESPIRATION RATE: 16 BRPM | BODY MASS INDEX: 23.9 KG/M2 | HEIGHT: 64 IN | HEART RATE: 81 BPM | SYSTOLIC BLOOD PRESSURE: 104 MMHG

## 2025-01-28 LAB
ANION GAP SERPL CALCULATED.3IONS-SCNC: 6 MMOL/L (ref 5–15)
BASOPHILS # BLD AUTO: 0.02 10*3/MM3 (ref 0–0.2)
BASOPHILS NFR BLD AUTO: 0.2 % (ref 0–1.5)
BUN SERPL-MCNC: 21 MG/DL (ref 8–23)
BUN/CREAT SERPL: 26.9 (ref 7–25)
CALCIUM SPEC-SCNC: 8.5 MG/DL (ref 8.6–10.5)
CHLORIDE SERPL-SCNC: 105 MMOL/L (ref 98–107)
CO2 SERPL-SCNC: 30 MMOL/L (ref 22–29)
CREAT SERPL-MCNC: 0.78 MG/DL (ref 0.57–1)
DEPRECATED RDW RBC AUTO: 48 FL (ref 37–54)
EGFRCR SERPLBLD CKD-EPI 2021: 78.3 ML/MIN/1.73
EOSINOPHIL # BLD AUTO: 0 10*3/MM3 (ref 0–0.4)
EOSINOPHIL NFR BLD AUTO: 0 % (ref 0.3–6.2)
ERYTHROCYTE [DISTWIDTH] IN BLOOD BY AUTOMATED COUNT: 13 % (ref 12.3–15.4)
GLUCOSE SERPL-MCNC: 133 MG/DL (ref 65–99)
HCT VFR BLD AUTO: 39.6 % (ref 34–46.6)
HGB BLD-MCNC: 12.7 G/DL (ref 12–15.9)
IMM GRANULOCYTES # BLD AUTO: 0.04 10*3/MM3 (ref 0–0.05)
IMM GRANULOCYTES NFR BLD AUTO: 0.4 % (ref 0–0.5)
LYMPHOCYTES # BLD AUTO: 0.88 10*3/MM3 (ref 0.7–3.1)
LYMPHOCYTES NFR BLD AUTO: 9.4 % (ref 19.6–45.3)
MCH RBC QN AUTO: 32.4 PG (ref 26.6–33)
MCHC RBC AUTO-ENTMCNC: 32.1 G/DL (ref 31.5–35.7)
MCV RBC AUTO: 101 FL (ref 79–97)
MONOCYTES # BLD AUTO: 0.9 10*3/MM3 (ref 0.1–0.9)
MONOCYTES NFR BLD AUTO: 9.6 % (ref 5–12)
NEUTROPHILS NFR BLD AUTO: 7.51 10*3/MM3 (ref 1.7–7)
NEUTROPHILS NFR BLD AUTO: 80.4 % (ref 42.7–76)
NRBC BLD AUTO-RTO: 0 /100 WBC (ref 0–0.2)
PLATELET # BLD AUTO: 314 10*3/MM3 (ref 140–450)
PMV BLD AUTO: 10.3 FL (ref 6–12)
POTASSIUM SERPL-SCNC: 4.2 MMOL/L (ref 3.5–5.2)
RBC # BLD AUTO: 3.92 10*6/MM3 (ref 3.77–5.28)
SODIUM SERPL-SCNC: 141 MMOL/L (ref 136–145)
WBC NRBC COR # BLD AUTO: 9.35 10*3/MM3 (ref 3.4–10.8)

## 2025-01-28 PROCEDURE — 63710000001 OXYCODONE 5 MG TABLET: Performed by: INTERNAL MEDICINE

## 2025-01-28 PROCEDURE — 97161 PT EVAL LOW COMPLEX 20 MIN: CPT

## 2025-01-28 PROCEDURE — A9270 NON-COVERED ITEM OR SERVICE: HCPCS | Performed by: INTERNAL MEDICINE

## 2025-01-28 PROCEDURE — 97165 OT EVAL LOW COMPLEX 30 MIN: CPT | Performed by: OCCUPATIONAL THERAPIST

## 2025-01-28 PROCEDURE — 63710000001 DESVENLAFAXINE 50 MG TABLET SUSTAINED-RELEASE 24 HOUR: Performed by: INTERNAL MEDICINE

## 2025-01-28 PROCEDURE — 97535 SELF CARE MNGMENT TRAINING: CPT | Performed by: OCCUPATIONAL THERAPIST

## 2025-01-28 PROCEDURE — 63710000001 DIPHENHYDRAMINE PER 50 MG: Performed by: INTERNAL MEDICINE

## 2025-01-28 PROCEDURE — 63710000001 BUPROPION XL 150 MG TABLET SUSTAINED-RELEASE 24 HOUR: Performed by: INTERNAL MEDICINE

## 2025-01-28 PROCEDURE — 63710000001 MEMANTINE 10 MG TABLET: Performed by: INTERNAL MEDICINE

## 2025-01-28 PROCEDURE — 63710000001 ACETAMINOPHEN 325 MG TABLET: Performed by: INTERNAL MEDICINE

## 2025-01-28 PROCEDURE — 63710000001 PANTOPRAZOLE 40 MG TABLET DELAYED-RELEASE: Performed by: INTERNAL MEDICINE

## 2025-01-28 PROCEDURE — 25010000002 CEFAZOLIN PER 500 MG: Performed by: ORTHOPAEDIC SURGERY

## 2025-01-28 PROCEDURE — 85025 COMPLETE CBC W/AUTO DIFF WBC: CPT | Performed by: ORTHOPAEDIC SURGERY

## 2025-01-28 PROCEDURE — 80048 BASIC METABOLIC PNL TOTAL CA: CPT | Performed by: ORTHOPAEDIC SURGERY

## 2025-01-28 RX ORDER — ROPIVACAINE HYDROCHLORIDE 2 MG/ML
2 INJECTION, SOLUTION EPIDURAL; INFILTRATION; PERINEURAL CONTINUOUS
Start: 2025-01-28

## 2025-01-28 RX ORDER — OXYCODONE HYDROCHLORIDE 5 MG/1
5 TABLET ORAL EVERY 6 HOURS PRN
Qty: 24 TABLET | Refills: 0 | Status: SHIPPED | OUTPATIENT
Start: 2025-01-28

## 2025-01-28 RX ORDER — DOCUSATE SODIUM 100 MG/1
100 CAPSULE, LIQUID FILLED ORAL 2 TIMES DAILY
Qty: 30 CAPSULE | Refills: 0 | Status: SHIPPED | OUTPATIENT
Start: 2025-01-28 | End: 2025-02-12

## 2025-01-28 RX ORDER — DIPHENHYDRAMINE HCL 25 MG
25 CAPSULE ORAL ONCE
Status: COMPLETED | OUTPATIENT
Start: 2025-01-28 | End: 2025-01-28

## 2025-01-28 RX ADMIN — ACETAMINOPHEN 650 MG: 325 TABLET ORAL at 14:41

## 2025-01-28 RX ADMIN — BUPROPION HYDROCHLORIDE 300 MG: 150 TABLET, EXTENDED RELEASE ORAL at 08:19

## 2025-01-28 RX ADMIN — OXYCODONE 5 MG: 5 TABLET ORAL at 10:34

## 2025-01-28 RX ADMIN — OXYCODONE 5 MG: 5 TABLET ORAL at 04:23

## 2025-01-28 RX ADMIN — DESVENLAFAXINE 50 MG: 50 TABLET, FILM COATED, EXTENDED RELEASE ORAL at 08:19

## 2025-01-28 RX ADMIN — OXYCODONE 5 MG: 5 TABLET ORAL at 16:03

## 2025-01-28 RX ADMIN — SODIUM CHLORIDE 2000 MG: 900 INJECTION INTRAVENOUS at 10:18

## 2025-01-28 RX ADMIN — MEMANTINE 10 MG: 10 TABLET ORAL at 02:05

## 2025-01-28 RX ADMIN — Medication 10 ML: at 02:34

## 2025-01-28 RX ADMIN — PANTOPRAZOLE SODIUM 40 MG: 40 TABLET, DELAYED RELEASE ORAL at 06:06

## 2025-01-28 RX ADMIN — DIPHENHYDRAMINE HYDROCHLORIDE 25 MG: 25 CAPSULE ORAL at 02:08

## 2025-01-28 RX ADMIN — Medication 10 ML: at 08:19

## 2025-01-28 RX ADMIN — SODIUM CHLORIDE 2000 MG: 900 INJECTION INTRAVENOUS at 02:23

## 2025-01-28 NOTE — PLAN OF CARE
Goal Outcome Evaluation:  Plan of Care Reviewed With: patient           Outcome Evaluation: OT educated pt on mariangel-dressing, care of nerve catheter during ADLs, sling application/wear schedule and fit, NWB LUE, digit ROM and home safety. OT issued AE to assist with ADLs at home and educated on use. She completed bed mobility with supervision and in-room mobility with supervision. Pt seen with infraclavicular pump off, no AROM noted at digits and c/o mild pain L posterior elbow. Pt lives alone and will be staying with her daughter who works during the day. Pt limited with pain, NWB/immobilization LUE (dominant), impaired sensation LUE, poor BUE integration and is performing below baseline. Recommend DC with HHOT and assist from family. OT to return in PM to assist her with dressing as her clothes are not at bedside.    Anticipated Discharge Disposition (OT): home with assist, home with home health

## 2025-01-28 NOTE — DISCHARGE INSTRUCTIONS
InfuBLOCK - Patient Information    What is a pain pump?  The InfuBLOCK pump delivers post-operative, non-narcotic, numbing medication to the nerve near the surgical site for pain relief.     Where can I find information about my pain pump?           For more information about your pain pump, scan the QR code.  For additional patient resources, visit [email or web address]/resources-pain-management.                                                                                               While your physician is your primary source for information about your treatment there may be times during your treatment that you need assistance with your infusion pump.     If you need assistance take the following steps:    The [facility] Nursing Hotline is Here for You 24/7.  Please call [phone] for the following concerns or complications:    Answers to questions about your infusion pump                 Tubing disconnect  Assistance with pump alarms                                                      Dislodged catheter  Excessive leakage noted from pump                                         Inadequate pain control    2.   [facility] Anesthesia Acute Pain Service: [phone] is available 24/7 for any further needs or concerns about medication or pain control.     -------------------------------------------------------------------------    Nerve Catheter Removal Instructions  When your device is empty:    Remove your catheter by pulling the dressing off slowly (like you would remove a regular bandage). The catheter should pull right out of the skin.  Check that the BLUE tip is intact.                                                                                     If the catheter is stuck, reposition your   extremity and pull slowly until removed.  *If catheter is HURTING and WON'T come out, stop and call [phone] for further assistance.    Remove medication bag from the black carrying case.  Cut the  tubing on right and left side of pump, and discard the medication bag and tubing into garbage.  Place the pump and black carrying case into the plastic bag and then place this into the return box.  Seal box with blue stickers and return to [employer] postal service. THIS IS PRE-PAID POSTAGE.        -------------------------------------------------------------------------    [facility] COLD THERAPY - PATIENT INSTRUCTION SHEET    Cold Compression Therapy for your comfort and rehabilitation  Your caregivers want you to be productive in your rehab and comfortable during your stay. In keeping with those goals, you will be receiving an SMI Cold Therapy Wrap to help ease post-operative pain and swelling that might keep you from getting back on track! Your SMI Cold Therapy Wrap is effective and simple-to-use, and you will be encouraged to apply it throughout your hospital stay and at home through the duration of your recovery.    When you are ready to go home  Be sure to take your SMI Cold Therapy Wrap and both sets of Gel Bags with you for continued comfort and use throughout your rehabilitation. If you don't already have them, ask your nurse or aide to retrieve your SMI Gel Bags from the patient freezer.    Home use precautions  Always follow your medical professional's application instructions upon discharge. Your SMI Cold Therapy Wrap and Gel Bags are designed to last for months following your surgery. Never heat the Gel Bags unless specified by your healthcare provider. Supervision is advised when using this product on children or geriatric patients. To avoid danger of suffocation, please keep the outer plastic packaging away from children & pets.    Cold Therapy Instructions  Place Gel Bags in a freezer set ¾ of the way to max temperature for at least (4) hours. For best results, lay the Gel Bags flat and [id number] in the freezer. Once frozen, slide Gel Bags into the gel pouch and secure your wrap to the affected area with the  straps.  Gel wraps that have been stored in a freezer for an extended period of time may require a (10) minute period of softening up in a room temperature environment before application.  The gel pouch acts as a protective barrier. NEVER place frozen bags directly onto skin, as this may cause frostbite injury.  The [facility] Cold Therapy Wrap is designed to be able to be worm while ambulating. The compression straps can be secured well enough so that the Wrap won't fall off while moving.  Wrap Application Videos can be viewed at [email or web address].  An additional protective barrier such as clothing, a washcloth, hand-towel or pillowcase may be used during prolonged treatment applications.  The Gel-Pouch and Wrap are both Latex-Free and the Gel Bag ingredients are non toxic.    [facility] Wrap care instructions  The [facility] Cold Therapy Wrap may be hand washed and hung to dry when needed.    [facility] re-order information  Additional [facility] body specific wraps and/or Gel Bags can be re-ordered from [email or web address] or call [email or web address] ([phone])

## 2025-01-28 NOTE — PLAN OF CARE
Goal Outcome Evaluation:  Plan of Care Reviewed With: patient        Progress: no change  Outcome Evaluation: PT initial eval completed. Pt presents near baseline with deficits related to NWB status of LUE. Ambulation of 640' with CGA and no AD was well tolerated. Pt also navigated steps with good stability. No further IPPT needs at this time, PT signing off. Rec d/c home with assist when medically appropriate.    Anticipated Discharge Disposition (PT): home with assist

## 2025-01-28 NOTE — ANESTHESIA PROCEDURE NOTES
Airway  Urgency: elective    Date/Time: 1/27/2025 7:06 PM  Airway not difficult    General Information and Staff    Patient location during procedure: OR    Indications and Patient Condition  Indications for airway management: airway protection    Preoxygenated: yes  Mask difficulty assessment: 1 - vent by mask    Final Airway Details  Final airway type: supraglottic airway      Successful airway: I-gel  Size 4     Number of attempts at approach: 1  Assessment: lips, teeth, and gum same as pre-op    Additional Comments  LMA placed without difficulty, ventilation with assist, equal breath sounds and symmetric chest rise and fall

## 2025-01-28 NOTE — PROGRESS NOTES
"Orthopedic Daily Progress Note      CC: s/p left radial head replacement, LCL repair POD #1    Pain is controlled  General: no fevers, chills  Abdomen: no nausea, vomiting, or diarrhea  Still needs to work with therapy   Block working well - no motor or sensory returned yet  No other complaints    Physical Exam:  I have reviewed the vital signs.  Temp:  [97.5 °F (36.4 °C)-98.5 °F (36.9 °C)] 97.9 °F (36.6 °C)  Heart Rate:  [74-92] 84  Resp:  [16] 16  BP: ()/(56-81) 108/65    Objective:  Vital signs: (most recent): Blood pressure 101/55, pulse 81, temperature 97.8 °F (36.6 °C), temperature source Oral, resp. rate 16, height 162.6 cm (64\"), weight 63.5 kg (140 lb), SpO2 92%, not currently breastfeeding.              General Appearance:    Alert, cooperative, no distress  Extremities: No clubbing, cyanosis, or edema to lower extremities  Pulses:  2+ in distal surgical extremity  Skin: Dressing Clean/dry/intact      Results Review:    I have reviewed the labs, radiology results and diagnostic studies: none     Results from last 7 days   Lab Units 01/23/25  1331   WBC 10*3/mm3 8.39   HEMOGLOBIN g/dL 12.3   PLATELETS 10*3/mm3 231     Results from last 7 days   Lab Units 01/23/25  1331   SODIUM mmol/L 139   POTASSIUM mmol/L 4.5   CO2 mmol/L 31.0*   CREATININE mg/dL 0.76   GLUCOSE mg/dL 93       I have reviewed the medications.    Assessment/Problem List  POD# 1 S/p left radial head replacement, LCL repair     Plan  MASOOD HOGUE  Remain in splint until follow-up  Keep splint clean, dry, and intact  Discussed how to perform hygiene  F/U in clinic on 2/4/25    Discharge Planning: I expect patient to be discharged to home in 1 days.    Bret Ruiz MD  01/28/25  07:05 EST            "

## 2025-01-28 NOTE — PLAN OF CARE
Goal Outcome Evaluation:  Plan of Care Reviewed With: patient, daughter           Outcome Evaluation: OT returned per nurse request as pain decreased and pt anticipating DC home. Daughter at bedside, reviewed NBW status, ADL retraining and sling management. Attempted UB dressing, however shirt sleeve would not accommodate dressing and pt to DC home in gown. She demo AROM L hand all digits and pre/post pain level 8/10 LUE. Recommend DC home with initial 24/7 assist and HHOT.    Anticipated Discharge Disposition (OT): home with 24/7 care, home with home health

## 2025-01-28 NOTE — CASE MANAGEMENT/SOCIAL WORK
Case Management Discharge Note      Final Note: I spoke with patient in regards to discharge planning.   She plans home with her daughter in [place]. Normally, she is independent with ADL's. Only DME are grab bars. She requested assistance with bath aide which I have arranged with [facility] after receiving the okay from Dr. Muro. Patient has [employer] Medicare. She has advanced directives.         Selected Continued Care - Admitted Since 1/27/2025       Destination    No services have been selected for the patient.                Durable Medical Equipment    No services have been selected for the patient.                Dialysis/Infusion    No services have been selected for the patient.                Home Medical Care Coordination complete.      Service Provider Services Address Phone Fax Patient Preferred    formerly [facility], Home Nursing [address], [address] [address] [phone] --              Therapy    No services have been selected for the patient.                Community Resources    No services have been selected for the patient.                Community & DME    No services have been selected for the patient.                         Final Discharge Disposition Code: 06 - home with home health care

## 2025-01-28 NOTE — THERAPY EVALUATION
Patient Name: Maura Barry  : 1945    MRN: [id number]                              Today's Date: 2025       Admit Date: 2025    Visit Dx:     ICD-10-CM ICD-9-CM   1. Nonintractable chronic migraine  G43.909 346.70   2. Vulvodynia  N94.819 625.70   3. Vaginal atrophy  N95.2 627.3   4. Urinary urgency  R39.15 788.63   5. Trigger point  M79.10 729.1   6. Spasm  R25.2 781.0   7. Spinal stenosis of lumbar region without neurogenic claudication  M48.061 724.02   8. Sensation of pressure in face  R44.8 782.0   9. Sacroiliitis  M46.1 720.2   10. Recurrent UTI  N39.0 599.0   11. Recurrent headache  R51.9 784.0   12. Pudendal neuralgia  G58.8 355.8   13. Perineum pain, female  R10.2 625.9   14. Nonallergic vasomotor rhinitis  J30.0 477.9   15. Myofascial pain  M79.18 729.1   16. Lumbar disc herniation with radiculopathy  M51.16 722.10     724.4   17. Increased frequency of urination  R35.0 788.41   18. History of episiotomy  Z98.890 V45.89   19. Urgency-frequency syndrome  N32.81 596.51   20. PFD (pelvic floor dysfunction)  M62.89 618.83   21. Cystocele with prolapse  N81.4 618.4   22. Tardive dyskinesia  G24.01 333.85   23. Seasonal allergic rhinitis due to other allergic trigger  J30.89 477.8   24. Other insomnia  G47.09 780.52   25. Memory loss  R41.3 780.93   26. OAB (overactive bladder)  N32.81 596.51   27. Menopausal osteoporosis  M81.0 733.01   28. Other fatigue  R53.83 780.79   29. Dyslipidemia  E78.5 272.4   30. Iron deficiency  E61.1 280.9   31. B12 deficiency  E53.8 266.2   32. KRISTY (obstructive sleep apnea)  G47.33 327.23     Patient Active Problem List   Diagnosis    KRISTY (obstructive sleep apnea)    B12 deficiency    Iron deficiency    Dyslipidemia    Other fatigue    Menopausal osteoporosis    OAB (overactive bladder)    Memory loss    Depression    Other insomnia    Allergic rhinitis due to allergen    Tardive dyskinesia    Chronic back pain    Cystocele with prolapse    PFD (pelvic  floor dysfunction)    Urgency-frequency syndrome    DDD (degenerative disc disease), lumbar    Dementia    History of episiotomy    Increased frequency of urination    Lumbar disc herniation with radiculopathy    Myofascial pain    Nonallergic vasomotor rhinitis    Perineum pain, female    Pudendal neuralgia    Recurrent headache    Recurrent UTI    Sacroiliitis    Sensation of pressure in face    Spinal stenosis of lumbar region without neurogenic claudication    Spasm    Trigger point    Urinary urgency    Vaginal atrophy    Vulvodynia    Insomnia    Nonintractable chronic migraine    Fracture of radial head, left, closed     Past Medical History:   Diagnosis Date    Allergic     Arthritis     Backache     Depression     Depression     Dysuria     GERD (gastroesophageal reflux disease) 5 years ago    Hyperlipidemia 202s    Neck pain     Osteopenia 2022    Pelvic floor dysfunction     Pelvic pain     Scoliosis 2008    Vaginitis      Past Surgical History:   Procedure Laterality Date    ANAL FISSURECTOMY      BLADDER SUSPENSION      COLONOSCOPY  [id number]?    Clear    EYE SURGERY  2012    Cataract removal    RESECTION OF NASAL TURBINATES      SINUS SURGERY  1960 nimo    Turbinate shrinkage    SPINE SURGERY  2022    Laminectomy L2L3    WISDOM TOOTH EXTRACTION        General Information       Row Name 01/28/25 1126          OT Time and Intention    Document Type evaluation  -AR     Mode of Treatment individual therapy;occupational therapy  -AR       Row Name 01/28/25 1126          General Information    Patient Profile Reviewed yes  -AR     Prior Level of Function independent:;all household mobility;community mobility;gait;transfer;ADL's;driving  -AR     Existing Precautions/Restrictions fall;other (see comments);left;non-weight bearing  simple sling, L infraclavicular nerve catheter  -AR     Barriers to Rehab none identified  -AR       Row Name 01/28/25 1126          Living Environment    People in Home alone;other  (see comments)  staying at her daughter's home since injury  -AR       Row Name 01/28/25 1126          Home Main Entrance    Number of Stairs, Main Entrance seven  -AR     Stair Railings, Main Entrance none  -AR       Row Name 01/28/25 1126          Stairs Within Home, Primary    Stairs, Within Home, Primary Pt is staying on main level of daughter's home  -AR       Row Name 01/28/25 1126          Cognition    Orientation Status (Cognition) oriented x 4  -AR       Row Name 01/28/25 1126          Safety Issues/Impairments Affecting Functional Mobility    Safety Issues Affecting Function (Mobility) awareness of need for assistance;insight into deficits/self-awareness  -AR     Impairments Affecting Function (Mobility) motor control;pain;range of motion (ROM);sensation/sensory awareness;strength  -AR               User Key  (r) = Recorded By, (t) = Taken By, (c) = Cosigned By      Initials Name Provider Type    Valentina Javier, OT Occupational Therapist                     Mobility/ADL's       Row Name 01/28/25 1216          Bed Mobility    Bed Mobility scooting/bridging;supine-sit  -AR     Scooting/Bridging Bell (Bed Mobility) supervision  -AR     Supine-Sit Bell (Bed Mobility) supervision  -AR     Assistive Device (Bed Mobility) head of bed elevated  -AR     Comment, (Bed Mobility) Educated pt on importance of maintaining NWB LUE and safe sleeping position.  -AR       Row Name 01/28/25 1216          Transfers    Transfers sit-stand transfer;stand-sit transfer  -AR     Comment, (Transfers) Cues to attend to location of nerve catheter.  -AR       Row Name 01/28/25 1216          Sit-Stand Transfer    Sit-Stand Bell (Transfers) supervision;verbal cues  -AR       Row Name 01/28/25 1216          Stand-Sit Transfer    Stand-Sit Bell (Transfers) supervision;verbal cues  -AR       Row Name 01/28/25 1216          Functional Mobility    Functional Mobility- Ind. Level supervision  required;verbal cues required  -AR       Row Name 01/28/25 1216          Activities of Daily Living    BADL Assessment/Intervention bathing;upper body dressing;lower body dressing;feeding  -AR       Row Name 01/28/25 1216          Mobility    Extremity Weight-bearing Status left upper extremity  -AR     Left Upper Extremity (Weight-bearing Status) non weight-bearing (NWB)   -AR       Row Name 01/28/25 1216          Bathing Assessment/Intervention    Comment, (Bathing) Educated pt that dressing and nerve catheter cannot get wet. Educated her on axilla care via pendulum technique as needed for comfort and to assist while she has significant residual numbness from block.  -AR       Row Name 01/28/25 1216          Upper Body Dressing Assessment/Training    Griggs Level (Upper Body Dressing) don;doff;dependent (less than 25% patient effort)  sling  -AR     Comment, (Upper Body Dressing) Educated pt on mariangel-dressing technique, care of nerve catheter to avoid dislodgement and sling wear/fit. Pt c/o neck pain, discussed that she may open neck strap with LUE supported while seated and awake.  -AR       Row Name 01/28/25 1216          Lower Body Dressing Assessment/Training    Griggs Level (Lower Body Dressing) don;socks;maximum assist (25% patient effort)  -AR     Position (Lower Body Dressing) edge of bed sitting  -AR     Comment, (Lower Body Dressing) Issued reacher, shoe horn and zipper pull/button hook to assist, educated on use.  -AR       Row Name 01/28/25 1216          Self-Feeding Assessment/Training    Comment, (Feeding) Issued scoop plate and discussed use of kitchen shelf paper lining under plate to assist with self-feeding as she is L hand dominant.  -AR               User Key  (r) = Recorded By, (t) = Taken By, (c) = Cosigned By      Initials Name Provider Type    Valentina Javier, OT Occupational Therapist                   Obj/Interventions       Row Name 01/28/25 1221          Sensory  Assessment (Somatosensory)    Sensory Assessment (Somatosensory) left UE  -AR     Sensory Subjective Reports insensate  -AR       Row Name 01/28/25 1221          Range of Motion Comprehensive    Comment, General Range of Motion RUE WNL, deferred shoulder d/t pain, no AROM digits and educated on SROM. Elbow/FA/wrist ROM not indicated d/t splint.  -AR       Row Name 01/28/25 1221          Strength Comprehensive (MMT)    Comment, General Manual Muscle Testing (MMT) Assessment RUE WFL, LUE deferred  -AR       Row Name 01/28/25 1221          Hand (Therapeutic Exercise)    Hand (Therapeutic Exercise) PROM (passive range of motion)  -AR     Hand PROM (Therapeutic Exercise) left;finger flexion;finger extension;10 repetitions  -AR       Row Name 01/28/25 1221          Motor Skills    Therapeutic Exercise hand  -AR       Row Name 01/28/25 1221          Balance    Balance Assessment sitting static balance;sitting dynamic balance;standing static balance;standing dynamic balance  -AR     Static Sitting Balance independent  -AR     Dynamic Sitting Balance independent  -AR     Position, Sitting Balance unsupported;sitting edge of bed  -AR     Static Standing Balance supervision  -AR     Dynamic Standing Balance supervision  -AR     Position/Device Used, Standing Balance supported  -AR               User Key  (r) = Recorded By, (t) = Taken By, (c) = Cosigned By      Initials Name Provider Type    Valentina Javier, KRISTINA Occupational Therapist                   Goals/Plan       Row Name 01/28/25 1228          Bathing Goal 1 (OT)    Time Frame (Bathing Goal 1, OT) short term goal (STG);1 day  -AR     Strategies/Barriers (Bathing Goal 1, OT) Pt will verbalize/demo axilla care LUE with max 2 vc  -AR     Progress/Outcomes (Bathing Goal 1, OT) goal met  -AR       Row Name 01/28/25 1228          Dressing Goal 1 (OT)    Time Frame (Dressing Goal 1, OT) long term goal (LTG);2 days  -AR     Strategies/Barriers (Dressing Goal 1, OT) Pt  will verbalize/demo shirt application, sling application and care of nerve catheter during UB ADLs with max 3 vc  -AR     Progress/Outcome (Dressing Goal 1, OT) goal ongoing  -AR       Row Name 01/28/25 1228          Problem Specific Goal 1 (OT)    Problem Specific Goal 1 (OT) Pt will maintain NWB LUE during all ADL activity with max 2 vc  -AR     Time Frame (Problem Specific Goal 1, OT) long term goal (LTG);2 days  -AR     Progress/Outcome (Problem Specific Goal 1, OT) goal ongoing  -AR       Row Name 01/28/25 1228          Therapy Assessment/Plan (OT)    Planned Therapy Interventions (OT) adaptive equipment training;BADL retraining;edema control/reduction;functional balance retraining;IADL retraining;occupation/activity based interventions;orthotic fabrication/fitting/training;patient/caregiver education/training;ROM/therapeutic exercise;transfer/mobility retraining  -AR               User Key  (r) = Recorded By, (t) = Taken By, (c) = Cosigned By      Initials Name Provider Type    AR Valentina Stewart, KRISTINA Occupational Therapist                   Clinical Impression       Row Name 01/28/25 1222          Pain Assessment    Pretreatment Pain Rating 0/10 - no pain  -AR     Posttreatment Pain Rating 2/10  -AR     Pain Location elbow  -AR     Pain Side/Orientation left;posterior  -AR     Pain Management Interventions activity modification encouraged;positioning techniques utilized  -AR     Response to Pain Interventions activity participation with tolerable pain  -AR       Row Name 01/28/25 1222          Plan of Care Review    Plan of Care Reviewed With patient  -AR     Outcome Evaluation OT educated pt on mariangel-dressing, care of nerve catheter during ADLs, sling application/wear schedule and fit, NWB LUE, digit ROM and home safety. OT issued AE to assist with ADLs at home and educated on use. She completed bed mobility with supervision and in-room mobility with supervision. Pt seen with infraclavicular pump off, no  AROM noted at digits and c/o mild pain L posterior elbow. Pt lives alone and will be staying with her daughter who works during the day. Pt limited with pain, NWB/immobilization LUE (dominant), impaired sensation LUE, poor BUE integration and is performing below baseline. Recommend DC with HHOT and assist from family. OT to return in PM to assist her with dressing as her clothes are not at bedside.  -AR       Row Name 01/28/25 1222          Therapy Assessment/Plan (OT)    Rehab Potential (OT) good  -AR     Criteria for Skilled Therapeutic Interventions Met (OT) yes  -AR     Therapy Frequency (OT) daily  -AR       Row Name 01/28/25 1222          Therapy Plan Review/Discharge Plan (OT)    Anticipated Discharge Disposition (OT) home with assist;home with home health  -AR       Row Name 01/28/25 1222          Vital Signs    Pre Patient Position Supine  -AR     Intra Patient Position Standing  -AR     Post Patient Position Supine  -AR       Row Name 01/28/25 1222          Positioning and Restraints    Pre-Treatment Position in bed  -AR     Post Treatment Position bed  -AR     In Bed notified nsg;supine;call light within reach;encouraged to call for assist;exit alarm on;with brace;LUE elevated  -AR               User Key  (r) = Recorded By, (t) = Taken By, (c) = Cosigned By      Initials Name Provider Type    Valentina Javier, OT Occupational Therapist                   Outcome Measures       Row Name 01/28/25 1229          How much help from another is currently needed...    Putting on and taking off regular lower body clothing? 2  -AR     Bathing (including washing, rinsing, and drying) 2  -AR     Toileting (which includes using toilet bed pan or urinal) 3  -AR     Putting on and taking off regular upper body clothing 2  -AR     Taking care of personal grooming (such as brushing teeth) 3  -AR     Eating meals 3  -AR     AM-PAC 6 Clicks Score (OT) 15  -AR       Row Name 01/28/25 0936 01/28/25 0819       How much  help from another person do you currently need...    Turning from your back to your side while in flat bed without using bedrails? 4  -AB 4  -RD    Moving from lying on back to sitting on the side of a flat bed without bedrails? 4  -AB 4  -RD    Moving to and from a bed to a chair (including a wheelchair)? 3  -AB 3  -RD    Standing up from a chair using your arms (e.g., wheelchair, bedside chair)? 3  -AB 3  -RD    Climbing 3-5 steps with a railing? 3  -AB 3  -RD    To walk in hospital room? 3  -AB 3  -RD    AM-PAC 6 Clicks Score (PT) 20  -AB 20  -RD    Highest Level of Mobility Goal 6 --> Walk 10 steps or more  -AB 6 --> Walk 10 steps or more  -RD      Row Name 01/28/25 1229 01/28/25 0936       Functional Assessment    Outcome Measure Options AM-PAC 6 Clicks Daily Activity (OT)  -AR AM-PAC 6 Clicks Basic Mobility (PT)  -AB              User Key  (r) = Recorded By, (t) = Taken By, (c) = Cosigned By      Initials Name Provider Type    Valentina Javier, OT Occupational Therapist    Hamida Maya, PT Physical Therapist    Nova Tobias, RN Registered Nurse                    Occupational Therapy Education       Title: PT OT SLP Therapies (In Progress)       Topic: Occupational Therapy (Done)       Point: ADL training (Done)       Description:   Instruct learner(s) on proper safety adaptation and remediation techniques during self care or transfers.   Instruct in proper use of assistive devices.                  Learning Progress Summary            Patient NARINDER Lyn TB,RAMAN, RENAE by AR at 1/28/2025 1230                      Point: Home exercise program (Done)       Description:   Instruct learner(s) on appropriate technique for monitoring, assisting and/or progressing therapeutic exercises/activities.                  Learning Progress Summary            Patient NARINDER Lyn,BRENDEN,RAMAN, VU by AR at 1/28/2025 1230                      Point: Precautions (Done)       Description:   Instruct learner(s) on prescribed  precautions during self-care and functional transfers.                  Learning Progress Summary            Patient Eboni, NARINDER,TB,D, VU by AR at 1/28/2025 1230                      Point: Body mechanics (Done)       Description:   Instruct learner(s) on proper positioning and spine alignment during self-care, functional mobility activities and/or exercises.                  Learning Progress Summary            Patient NARINDER Lyn,TB,D, VU by AR at 1/28/2025 1230                                      User Key       Initials Effective Dates Name Provider Type Discipline    AR 07/11/23 -  Valentina Stewart OT Occupational Therapist OT                  OT Recommendation and Plan  Planned Therapy Interventions (OT): adaptive equipment training, BADL retraining, edema control/reduction, functional balance retraining, IADL retraining, occupation/activity based interventions, orthotic fabrication/fitting/training, patient/caregiver education/training, ROM/therapeutic exercise, transfer/mobility retraining  Therapy Frequency (OT): daily  Plan of Care Review  Plan of Care Reviewed With: patient  Outcome Evaluation: OT educated pt on mariangel-dressing, care of nerve catheter during ADLs, sling application/wear schedule and fit, NWB LUE, digit ROM and home safety. OT issued AE to assist with ADLs at home and educated on use. She completed bed mobility with supervision and in-room mobility with supervision. Pt seen with infraclavicular pump off, no AROM noted at digits and c/o mild pain L posterior elbow. Pt lives alone and will be staying with her daughter who works during the day. Pt limited with pain, NWB/immobilization LUE (dominant), impaired sensation LUE, poor BUE integration and is performing below baseline. Recommend DC with HHOT and assist from family. OT to return in PM to assist her with dressing as her clothes are not at bedside.     Time Calculation:   Evaluation Complexity (OT)  Review Occupational  Profile/Medical/Therapy History Complexity: brief/low complexity  Assessment, Occupational Performance/Identification of Deficit Complexity: 1-3 performance deficits  Clinical Decision Making Complexity (OT): problem focused assessment/low complexity  Overall Complexity of Evaluation (OT): low complexity     Time Calculation- OT       Row Name 01/28/25 1230             Time Calculation- OT    OT Start Time 0919  -AR      OT Received On 01/28/25  -AR      OT Goal Re-Cert Due Date 02/07/25  -AR         Timed Charges    82710 - OT Self Care/Mgmt Minutes 14  -AR         Untimed Charges    OT Eval/Re-eval Minutes 55  -AR         Total Minutes    Timed Charges Total Minutes 14  -AR      Untimed Charges Total Minutes 55  -AR       Total Minutes 69  -AR                User Key  (r) = Recorded By, (t) = Taken By, (c) = Cosigned By      Initials Name Provider Type    Valentina Javier OT Occupational Therapist                  Therapy Charges for Today       Code Description Service Date Service Provider Modifiers Qty    47855914069 HC OT SELF CARE/MGMT/TRAIN EA 15 MIN 1/28/2025 Valentina Stewart OT GO 1    09728711223 HC OT EVAL LOW COMPLEXITY 4 1/28/2025 Valentina Stewart OT GO 1                 Valentina Stewart OT  1/28/2025

## 2025-01-28 NOTE — TELEPHONE ENCOUNTER
"Caller: Maura Barry \"Dorota\"    Relationship: Self    Best call back number: [phone]    What is the best time to reach you: ANY    Who are you requesting to speak with (clinical staff, provider,  specific staff member):     CLINICAL    What was the call regarding:     PATIENT HAD SUBSEQUENT MEDICARE WELLNESS TOMORROW.     SHE IS IN [facility] SHE FELL AND SHATTERED ELBOW.  THEY HAVE DONE ALL SORTS OF BLOOD WORK.  IF DR THRASHER WANTS TO DO WELLNESS APPOINTMENT ONLINE, SHE CAN.      PLEASE CALL PATIENT TO DISCUSS     "

## 2025-01-28 NOTE — PLAN OF CARE
Problem: Adult Inpatient Plan of Care  Goal: Absence of Hospital-Acquired Illness or Injury  1/28/2025 0637 by Yael Bocanegra RN  Outcome: Progressing  1/28/2025 0615 by Yael Bocanegra RN  Outcome: Progressing  Intervention: Identify and Manage Fall Risk  Recent Flowsheet Documentation  Taken 1/28/2025 0445 by Yael Bocanegra RN  Safety Promotion/Fall Prevention: activity supervised  Taken 1/28/2025 0045 by Yael Bocanegra RN  Safety Promotion/Fall Prevention:   activity supervised   assistive device/personal items within reach   clutter free environment maintained   fall prevention program maintained   room organization consistent   safety round/check completed  Taken 1/27/2025 2151 by Yael Bocanegra RN  Safety Promotion/Fall Prevention:   assistive device/personal items within reach   clutter free environment maintained   fall prevention program maintained   room organization consistent   safety round/check completed  Intervention: Prevent Skin Injury  Recent Flowsheet Documentation  Taken 1/28/2025 0445 by Yael Bocanegra RN  Body Position: position changed independently  Taken 1/28/2025 0045 by Yael Bocanegra RN  Skin Protection: incontinence pads utilized  Taken 1/27/2025 2151 by Yael Bocanegra RN  Body Position: supine  Intervention: Prevent and Manage VTE (Venous Thromboembolism) Risk  Recent Flowsheet Documentation  Taken 1/27/2025 2145 by Yael Bocanegra RN  VTE Prevention/Management:   bilateral   SCDs (sequential compression devices) on   Goal Outcome Evaluation:

## 2025-01-28 NOTE — PROGRESS NOTES
[facility]    Acute pain service Inpatient Progress Note    Patient Name: Maura Barry  :  1945  MRN:  [id number]        Acute Pain  Service Inpatient Progress Note:    Analgesia:Excellent  Pain Score:0/10  LOC: alert and awake  Resp Status: room air  Cardiac: VS stable  Side Effects:None  Catheter Site:clean, dressing intact and dry  Cath type: peripheral nerve cath(InfuSystem)  Volume: 1mL,5ml, 5ml InfuSystem Pump.  Catheter Plan:Catheter to remain Insitu and Continue catheter infusion rate unchanged  Comments: Completely numb hand. Paused pump. Instructed nurse to assess after 1 hr and 2 hr if necessary

## 2025-01-28 NOTE — PLAN OF CARE
Goal Outcome Evaluation:              Outcome Evaluation: Patient discharged home with daughter. Infu block stopped during shift by anesthesia due to numbness in hand, but restarted when feeling returned. PRN PO pain medication administered. Ace wrap surgical dressing C/D/I. Sling in place on LUE.

## 2025-01-28 NOTE — DISCHARGE SUMMARY
Patient Name: Maura Barry  MRN: [id number]  : 1945  DOS: 2025    Attending: De Muro MD    Primary Care Provider: Bronwyn Bird MD    Date of Admission:.2025  1:29 PM    Date of Discharge:  2025    Discharge Diagnosis:    S/p left radial head replacement, LCL repair      Fracture of radial head, left, closed    KRISTY (obstructive sleep apnea)    Dyslipidemia    Depression    Tardive dyskinesia    Dementia      Hospital Course    At admit:  Patient is a pleasant 79 y.o. female presented for scheduled surgery by .  Patient had an injury at the gym, was diagnosed with left radial head fracture and left elbow dislocation.  She opted proceed with surgery.     I saw preoperatively, expected pain in the left elbow.  Reviewed with patient her past medical history and home medications.     Subsequent notes indicate she later underwent left radial head replacement and lateral collateral ligament repair.  Surgery was done under general anesthesia and a block, was tolerated well, she was admitted for further management.    After admit:  Patient was provided pain medications as needed for pain control, along with peripheral nerve block infusion of Ropivacaine    Adjustments were made to pain medications to optimize postop pain management.   Risks and benefits of opiate medications discussed with patient. DAINEL report on chart was reviewed.    The patient was seen by OT and PT .   The patient used an IS for atelectasis prophylaxis and mechanicals for DVT prophylaxis.    Home medications were resumed as appropriate, and labs were monitored and remained fairly stable.     With the progress she has made, pt is ready for DC home today.      The patient will have an Infupump ( instructed on it during this admit)  Discussed with patient regarding plan and she shows understanding and agreement.        Procedures Performed  Procedure(s):  RADIAL HEAD REPLACEMENT LEFT  LATERAL  "COLLATERAL LIGAMENT REPAIR LEFT       Pertinent Test Results:    I reviewed the patient's new clinical results.   Results from last 7 days   Lab Units 01/28/25  0626 01/23/25  1331   WBC 10*3/mm3 9.35 8.39   HEMOGLOBIN g/dL 12.7 12.3   HEMATOCRIT % 39.6 38.6   PLATELETS 10*3/mm3 314 231     Results from last 7 days   Lab Units 01/28/25  0626 01/23/25  1331   SODIUM mmol/L 141 139   POTASSIUM mmol/L 4.2 4.5   CHLORIDE mmol/L 105 101   CO2 mmol/L 30.0* 31.0*   BUN mg/dL 21 19   CREATININE mg/dL 0.78 0.76   CALCIUM mg/dL 8.5* 9.4   GLUCOSE mg/dL 133* 93     I reviewed the patient's new imaging including images and reports.      Occupational Therapy     Signed         Goal Outcome Evaluation:  Plan of Care Reviewed With: patient, daughter  Outcome Evaluation: OT returned per nurse request as pain decreased and pt anticipating DC home. Daughter at bedside, reviewed NBW status, ADL retraining and sling management. Attempted UB dressing, however shirt sleeve would not accommodate dressing and pt to DC home in gown. She demo AROM L hand all digits and pre/post pain level 8/10 LUE. Recommend DC home with initial 24/7 assist and HHOT.     Anticipated Discharge Disposition (OT): home with 24/7 care, home with home health                   Physical therapy  Signed         Goal Outcome Evaluation:  Plan of Care Reviewed With: patient  Progress: no change  Outcome Evaluation: PT initial eval completed. Pt presents near baseline with deficits related to NWB status of LUE. Ambulation of 640' with CGA and no AD was well tolerated. Pt also navigated steps with good stability. No further IPPT needs at this time, PT signing off. Rec d/c home with assist when medically appropriate.     Anticipated Discharge Disposition (PT): home with assist             Discharge Assessment:       Visit Vitals  /65 (BP Location: Right arm, Patient Position: Lying)   Pulse 81   Temp 97.7 °F (36.5 °C) (Oral)   Resp 16   Ht 162.6 cm (64\")   Wt 63.5 " kg (140 lb)   SpO2 92%   BMI 24.03 kg/m²     Temp (24hrs), Av.8 °F (36.6 °C), Min:97.5 °F (36.4 °C), Max:98.5 °F (36.9 °C)      General Appearance:    Alert, cooperative, in no acute distress   Lungs:     Clear to auscultation,respirations regular, even and   unlabored    Heart:    Regular rhythm and normal rate, normal S1 and S2    Abdomen:     Normal bowel sounds, no masses, no organomegaly, soft        non-tender, non-distended, no guarding, no rebound                 tenderness   Extremities:   LUE in a sling, CDI  dressing  Peripheral nerve block cath present.  Distal pulses, cap refill,  intact. Movement and sensation decreased of the hand and wrist due to block.      Pulses:   Pulses palpable and equal bilaterally   Skin:   No bleeding, bruising or rash        Discharge Disposition:home.           Discharge Medications        New Medications        Instructions Start Date   docusate sodium 100 MG capsule  Commonly known as: COLACE   100 mg, Oral, 2 Times Daily      oxyCODONE 5 MG immediate release tablet  Commonly known as: ROXICODONE   5 mg, Oral, Every 6 Hours PRN      ropivacaine 0.2 % infusion (INFUSYSTEM)  Commonly known as: NAROPIN   2 mL/hr (4 mg/hr), Peripheral Nerve, Continuous             Continue These Medications        Instructions Start Date   ALLERGY SERUM INJECTION   Weekly      azelastine 0.1 % nasal spray  Commonly known as: ASTELIN       buPROPion  MG 24 hr tablet  Commonly known as: WELLBUTRIN XL   1 tablet, Daily      celecoxib 200 MG capsule  Commonly known as: CeleBREX   200 mg, Oral, Daily PRN      Cyanocobalamin 1000 MCG sublingual tablet   1 tablet, Sublingual, Daily      desvenlafaxine 50 MG 24 hr tablet  Commonly known as: PRISTIQ   No dose, route, or frequency recorded.      estradiol 0.1 MG/GM vaginal cream  Commonly known as: ESTRACE   INSERT ONE GRAM VAGINALLY TWO TO THREE TIMES PER WEEK AS DIRECTED      fluticasone 50 MCG/ACT nasal spray  Commonly known as: FLONASE    2 sprays, Nasal, Daily      Glucosamine HCl 1000 MG tablet   2,000 mg, Daily      magnesium oxide 400 (241.3 Mg) MG tablet tablet  Commonly known as: MAGOX   400 mg, Daily      memantine 28 MG capsule sustained-release 24 hr extended release capsule  Commonly known as: NAMENDA XR   No dose, route, or frequency recorded.      Myrbetriq 50 MG tablet sustained-release 24 hour 24 hr tablet  Generic drug: Mirabegron ER   50 mg, Oral, Daily      pantoprazole 40 MG EC tablet  Commonly known as: PROTONIX   40 mg, Oral, Daily      triamcinolone 0.025 % cream  Commonly known as: KENALOG   1 Application, Topical, 2 Times Daily      Vitamin C 500 MG capsule   1,000 mg, Daily      vitamin D3 125 MCG (5000 UT) capsule capsule   5,000 Units, Daily      Wal-phed PE 10 MG tablet  Generic drug: phenylephrine   1 tablet, Every 12 Hours Scheduled      zoledronic acid 5 MG/100ML solution infusion  Commonly known as: Reclast   5 mg, Intravenous, Once             Stop These Medications      HYDROcodone-acetaminophen 5-325 MG per tablet  Commonly known as: NORCO              Discharge Diet: Resume prior.     Activity at Discharge:   NWB BETSYE, keep splint/dressing CDI.         Future Appointments   Date Time Provider Department Center   2/6/2025 10:30 AM Bronwyn Bird MD [facility]   3/10/2025  9:30 AM [facility] Muro MD per his orders.          Derrick Gilliam MD  01/28/25  12:51 EST

## 2025-01-28 NOTE — H&P
Patient Name: Maura Barry  MRN: [id number]  : 1945  DOS: 2025    Attending: De Muro MD    Primary Care Provider: Bronwyn Bird MD      Chief complaint: Left radial head fracture, left elbow dislocation    Subjective   Patient is a pleasant 79 y.o. female presented for scheduled surgery by .  Patient had an injury at the gym, was diagnosed with left radial head fracture and left elbow dislocation.  She opted proceed with surgery.    I saw preoperatively, expected pain in the left elbow.  Reviewed with patient her past medical history and home medications.    Subsequent notes indicate she later underwent left radial head replacement and lateral collateral ligament repair.  Surgery was done under general anesthesia and a block, was tolerated well, she was admitted for further management.       Allergies   Allergen Reactions    Penicillins Hives    Methocarbamol Itching      Medications Prior to Admission   Medication Sig Dispense Refill Last Dose/Taking    ALLERGY SERUM INJECTION Inject  under the skin 1 (One) Time Per Week.   Past Week    Ascorbic Acid (VITAMIN C) 500 MG capsule Take 1,000 mg by mouth Daily.   2025    azelastine (ASTELIN) 0.1 % nasal spray    Past Month    buPROPion XL (WELLBUTRIN XL) 300 MG 24 hr tablet Take 1 tablet by mouth Daily.  2 2025    celecoxib (CeleBREX) 200 MG capsule Take 1 capsule by mouth Daily As Needed for Mild Pain. 90 capsule 1 Past Week    Cholecalciferol (VITAMIN D3) 5000 units capsule capsule Take 1 capsule by mouth Daily.   2025    Cyanocobalamin 1000 MCG sublingual tablet Place 1 tablet under the tongue Daily. 30 each 5 2025    desvenlafaxine (PRISTIQ) 50 MG 24 hr tablet    2025    estradiol (ESTRACE) 0.1 MG/GM vaginal cream INSERT ONE GRAM VAGINALLY TWO TO THREE TIMES PER WEEK AS DIRECTED 42 g 1 Past Month    fluticasone (FLONASE) 50 MCG/ACT nasal spray 2 sprays into the nostril(s) as directed by  provider Daily. 16 g 1 1/27/2025    Glucosamine HCl 1000 MG tablet Take 2,000 mg by mouth Daily.   1/26/2025    magnesium oxide (MAGOX) 400 (241.3 Mg) MG tablet tablet Take 1 tablet by mouth Daily.   1/26/2025    memantine (NAMENDA XR) 28 MG capsule sustained-release 24 hr extended release capsule    1/26/2025    Myrbetriq 50 MG tablet sustained-release 24 hour 24 hr tablet Take 50 mg by mouth Daily. 30 tablet 3 1/26/2025    pantoprazole (PROTONIX) 40 MG EC tablet TAKE 1 TABLET BY MOUTH DAILY 90 tablet 1 1/26/2025    triamcinolone (KENALOG) 0.025 % cream Apply 1 Application topically to the appropriate area as directed 2 (Two) Times a Day. 80 g 1 1/27/2025    HYDROcodone-acetaminophen (NORCO) 5-325 MG per tablet Take 1 tablet by mouth Every 6 (Six) Hours As Needed for Moderate Pain. 12 tablet 0 1/23/2025    Wal-phed PE 10 MG tablet Take 1 tablet by mouth Every 12 (Twelve) Hours.   More than a month    zoledronic acid (Reclast) 5 MG/100ML solution infusion Infuse 100 mL into a venous catheter 1 (One) Time for 1 dose.   4/15/2024       Past Medical History:   Diagnosis Date    Allergic     Arthritis     Backache     Depression     Depression     Dysuria     GERD (gastroesophageal reflux disease) 5 years ago    Hyperlipidemia 202s    Neck pain     Osteopenia 2022    Pelvic floor dysfunction     Pelvic pain     Scoliosis 2008    Vaginitis      Past Surgical History:   Procedure Laterality Date    ANAL FISSURECTOMY      BLADDER SUSPENSION      COLONOSCOPY  [id number]?    Clear    EYE SURGERY  2012    Cataract removal    RESECTION OF NASAL TURBINATES      SINUS SURGERY  1960 nimo    Turbinate shrinkage    SPINE SURGERY  2022    Laminectomy L2L3    WISDOM TOOTH EXTRACTION       Family History   Problem Relation Age of Onset    Stroke Mother     Arthritis Mother     Mental illness Mother     Osteoporosis Mother     Alcohol abuse Mother         Kwabena brother    Depression Mother     Alzheimer's disease Father      "Hyperlipidemia Father     Asthma Brother     Heart failure Brother     Hypertension Brother     Mental illness Brother     Cancer Brother     Heart attack Brother     Liver disease Brother     Alcohol abuse Brother     Anxiety disorder Brother     COPD Brother     Depression Brother     Heart disease Brother     Alcohol abuse Brother     Breast cancer Neg Hx     Ovarian cancer Neg Hx      Social History     Tobacco Use    Smoking status: Never    Smokeless tobacco: Never   Vaping Use    Vaping status: Never Used   Substance Use Topics    Alcohol use: Yes     Alcohol/week: 1.0 standard drink of alcohol     Types: 1 Drinks containing 0.5 oz of alcohol per week     Comment: 1 or less a week    Drug use: Never       Review of Systems  Pertinent items are noted in HPI    Vital Signs  /60 (BP Location: Right arm, Patient Position: Lying)   Pulse 84   Temp 97.6 °F (36.4 °C) (Oral)   Resp 16   Ht 162.6 cm (64\")   Wt 63.5 kg (140 lb)   SpO2 91%   BMI 24.03 kg/m²     Physical Exam:    General Appearance:    Alert, cooperative, in no acute distress   Head:    Normocephalic, without obvious abnormality, atraumatic   Eyes:            Lids and lashes normal, conjunctivae and sclerae normal, no   icterus, no pallor, corneas clear,    Ears:    Ears appear intact with no abnormalities noted   Throat:   No oral lesions, no thrush, oral mucosa moist   Neck:   No adenopathy, supple, trachea midline, no thyromegaly         Lungs:     Clear to auscultation,respirations regular, even and                   unlabored    Heart:    Regular rhythm and normal rate, normal S1 and S2, no            murmur, no gallop   Abdomen:     Normal bowel sounds, no masses, no organomegaly, soft,        nontender, nondistended, no guarding, no rebound                 tenderness   Genitalia:    Deferred   Extremities: Mild swelling over left elbow when seen preop.   Pulses:   Pulses palpable and equal bilaterally   Skin:   No bleeding, bruising " or rash          I reviewed the patient's new clinical results.       Results from last 7 days   Lab Units 01/23/25  1331   WBC 10*3/mm3 8.39   HEMOGLOBIN g/dL 12.3   HEMATOCRIT % 38.6   PLATELETS 10*3/mm3 231     Results from last 7 days   Lab Units 01/23/25  1331   SODIUM mmol/L 139   POTASSIUM mmol/L 4.5   CHLORIDE mmol/L 101   CO2 mmol/L 31.0*   BUN mg/dL 19   CREATININE mg/dL 0.76   CALCIUM mg/dL 9.4   GLUCOSE mg/dL 93     Lab Results   Component Value Date    HGBA1C 5.70 (H) 01/23/2025           Assessment and Plan:   S/p LEFT RADIAL HEAD REPLACEMENT  LATERAL COLLATERAL LIGAMENT REPAIR       Fracture of radial head, left, closed    KRISTY (obstructive sleep apnea)    Dyslipidemia    Depression    Tardive dyskinesia    Dementia      Plan  1. PT/OT  2. Pain control-prns, peripheral nerve block catheter, multimodal approach   3. IS-encourage  4. DVT proph- mechanicals  5. Bowel regimen  6. Resume home medications as appropriate  7. Monitor post-op labs  8. Discharge planning for home    -Depression: Maintained on home regimen.    -Dementia: Resume Namenda.      Dragon disclaimer:  Part of this encounter note is an electronic transcription/translation of spoken language to printed text. The electronic translation of spoken language may permit erroneous, or at times, nonsensical words or phrases to be inadvertently transcribed; Although I have reviewed the note for such errors, some may still exist.    Derrick Gilliam MD  01/27/25  21:29 EST

## 2025-01-28 NOTE — BRIEF OP NOTE
RADIAL HEAD ARTHROPLASTY, ELBOW COLLATERAL LIGAMENT RECONSTRUCTION  Progress Note    Maura Barry  1/27/2025    Pre-op Diagnosis:   Fracture of radial head, left, closed [083025]       Post-Op Diagnosis Codes:     * Fracture of radial head, left, closed [S52.122A]     * Closed dislocation of left elbow, initial encounter [S53.105A]    Procedure/CPT® Codes:      Procedure(s):  LEFT RADIAL HEAD REPLACEMENT  LATERAL COLLATERAL LIGAMENT REPAIR              Surgeon(s):  De Muro MD Schweller, Eric W, DO    Anesthesia: Choice    Staff:   Circulator: Abril Garcia RN  Scrub Person: Briseida Nugent; Kobe Waters         Estimated Blood Loss: minimal    Urine Voided: * No values recorded between 1/27/2025  7:01 PM and 1/27/2025  8:13 PM *    Specimens:                None          Drains: * No LDAs found *    Findings: per dict        Complications: none          De Muro MD     Date: 1/27/2025  Time: 20:21 EST

## 2025-01-28 NOTE — PLAN OF CARE
Problem: Adult Inpatient Plan of Care  Goal: Absence of Hospital-Acquired Illness or Injury  Outcome: Progressing  Intervention: Identify and Manage Fall Risk  Recent Flowsheet Documentation  Taken 1/28/2025 0445 by Yael Bocanegra RN  Safety Promotion/Fall Prevention: activity supervised  Taken 1/28/2025 0045 by Yael Bocanegra RN  Safety Promotion/Fall Prevention:   activity supervised   assistive device/personal items within reach   clutter free environment maintained   fall prevention program maintained   room organization consistent   safety round/check completed  Taken 1/27/2025 2151 by Yael Bocanegra RN  Safety Promotion/Fall Prevention:   assistive device/personal items within reach   clutter free environment maintained   fall prevention program maintained   room organization consistent   safety round/check completed  Intervention: Prevent Skin Injury  Recent Flowsheet Documentation  Taken 1/28/2025 0445 by Yael Bocanegra RN  Body Position: position changed independently  Taken 1/28/2025 0045 by Yael Bocangera RN  Skin Protection: incontinence pads utilized  Taken 1/27/2025 2151 by Yael Bocanegra RN  Body Position: supine  Intervention: Prevent and Manage VTE (Venous Thromboembolism) Risk  Recent Flowsheet Documentation  Taken 1/27/2025 2145 by Yael Bocanegra RN  VTE Prevention/Management:   bilateral   SCDs (sequential compression devices) on   Goal Outcome Evaluation:

## 2025-01-28 NOTE — TELEPHONE ENCOUNTER
LM for pt not to worry about tomorrows appt, to rest and feel better.  Shoud be sched for tcm soon, can discuss homa of wellness at that time.

## 2025-01-28 NOTE — THERAPY TREATMENT NOTE
Patient Name: Maura Barry  : 1945    MRN: [id number]                              Today's Date: 2025       Admit Date: 2025    Visit Dx:     ICD-10-CM ICD-9-CM   1. Nonintractable chronic migraine  G43.909 346.70   2. Vulvodynia  N94.819 625.70   3. Vaginal atrophy  N95.2 627.3   4. Urinary urgency  R39.15 788.63   5. Trigger point  M79.10 729.1   6. Spasm  R25.2 781.0   7. Spinal stenosis of lumbar region without neurogenic claudication  M48.061 724.02   8. Sensation of pressure in face  R44.8 782.0   9. Sacroiliitis  M46.1 720.2   10. Recurrent UTI  N39.0 599.0   11. Recurrent headache  R51.9 784.0   12. Pudendal neuralgia  G58.8 355.8   13. Perineum pain, female  R10.2 625.9   14. Nonallergic vasomotor rhinitis  J30.0 477.9   15. Myofascial pain  M79.18 729.1   16. Lumbar disc herniation with radiculopathy  M51.16 722.10     724.4   17. Increased frequency of urination  R35.0 788.41   18. History of episiotomy  Z98.890 V45.89   19. Urgency-frequency syndrome  N32.81 596.51   20. PFD (pelvic floor dysfunction)  M62.89 618.83   21. Cystocele with prolapse  N81.4 618.4   22. Tardive dyskinesia  G24.01 333.85   23. Seasonal allergic rhinitis due to other allergic trigger  J30.89 477.8   24. Other insomnia  G47.09 780.52   25. Memory loss  R41.3 780.93   26. OAB (overactive bladder)  N32.81 596.51   27. Menopausal osteoporosis  M81.0 733.01   28. Other fatigue  R53.83 780.79   29. Dyslipidemia  E78.5 272.4   30. Iron deficiency  E61.1 280.9   31. B12 deficiency  E53.8 266.2   32. KRISTY (obstructive sleep apnea)  G47.33 327.23     Patient Active Problem List   Diagnosis    KRISTY (obstructive sleep apnea)    B12 deficiency    Iron deficiency    Dyslipidemia    Other fatigue    Menopausal osteoporosis    OAB (overactive bladder)    Memory loss    Depression    Other insomnia    Allergic rhinitis due to allergen    Tardive dyskinesia    Chronic back pain    Cystocele with prolapse    PFD (pelvic  floor dysfunction)    Urgency-frequency syndrome    DDD (degenerative disc disease), lumbar    Dementia    History of episiotomy    Increased frequency of urination    Lumbar disc herniation with radiculopathy    Myofascial pain    Nonallergic vasomotor rhinitis    Perineum pain, female    Pudendal neuralgia    Recurrent headache    Recurrent UTI    Sacroiliitis    Sensation of pressure in face    Spinal stenosis of lumbar region without neurogenic claudication    Spasm    Trigger point    Urinary urgency    Vaginal atrophy    Vulvodynia    Insomnia    Nonintractable chronic migraine    Fracture of radial head, left, closed     Past Medical History:   Diagnosis Date    Allergic     Arthritis     Backache     Depression     Depression     Dysuria     GERD (gastroesophageal reflux disease) 5 years ago    Hyperlipidemia 202s    Neck pain     Osteopenia 2022    Pelvic floor dysfunction     Pelvic pain     Scoliosis 2008    Vaginitis      Past Surgical History:   Procedure Laterality Date    ANAL FISSURECTOMY      BLADDER SUSPENSION      COLONOSCOPY  [id number]?    Clear    EYE SURGERY  2012    Cataract removal    RESECTION OF NASAL TURBINATES      SINUS SURGERY  1960 nimo    Turbinate shrinkage    SPINE SURGERY  2022    Laminectomy L2L3    WISDOM TOOTH EXTRACTION        General Information       Row Name 01/28/25 1642 01/28/25 1126       OT Time and Intention    Document Type other (see comments)  OT arrived at 1530 as requested to assist pt w/dressing, no clothes at bedside and Pt reporting 10/10 pain LUE. OT notified RN.OT returned at 1630 as clothes at bedside and pt states she should not DC d/t 10/10 pain. RN notified. OT to f/u  -AR evaluation  -AR    Mode of Treatment -- individual therapy;occupational therapy  -AR      Row Name 01/28/25 1125          General Information    Patient Profile Reviewed yes  -AR     Prior Level of Function independent:;all household mobility;community mobility;gait;transfer;ADL's;driving   -AR     Existing Precautions/Restrictions fall;other (see comments);left;non-weight bearing  simple sling, L infraclavicular nerve catheter  -AR     Barriers to Rehab none identified  -AR       Row Name 01/28/25 1126          Living Environment    People in Home alone;other (see comments)  staying at her daughter's home since injury  -AR       Row Name 01/28/25 1126          Home Main Entrance    Number of Stairs, Main Entrance seven  -AR     Stair Railings, Main Entrance none  -AR       Row Name 01/28/25 1126          Stairs Within Home, Primary    Stairs, Within Home, Primary Pt is staying on main level of daughter's home  -AR       Row Name 01/28/25 1126          Cognition    Orientation Status (Cognition) oriented x 4  -AR       Row Name 01/28/25 1126          Safety Issues/Impairments Affecting Functional Mobility    Safety Issues Affecting Function (Mobility) awareness of need for assistance;insight into deficits/self-awareness  -AR     Impairments Affecting Function (Mobility) motor control;pain;range of motion (ROM);sensation/sensory awareness;strength  -AR               User Key  (r) = Recorded By, (t) = Taken By, (c) = Cosigned By      Initials Name Provider Type    Valentina Javier OT Occupational Therapist                     Mobility/ADL's       Row Name 01/28/25 1216          Bed Mobility    Bed Mobility scooting/bridging;supine-sit  -AR     Scooting/Bridging [place] (Bed Mobility) supervision  -AR     Supine-Sit [place] (Bed Mobility) supervision  -AR     Assistive Device (Bed Mobility) head of bed elevated  -AR     Comment, (Bed Mobility) Educated pt on importance of maintaining NWB LUE and safe sleeping position.  -AR       Row Name 01/28/25 1216          Transfers    Transfers sit-stand transfer;stand-sit transfer  -AR     Comment, (Transfers) Cues to attend to location of nerve catheter.  -AR       Row Name 01/28/25 1216          Sit-Stand Transfer    Sit-Stand [place]  (Transfers) supervision;verbal cues  -AR       Row Name 01/28/25 1216          Stand-Sit Transfer    Stand-Sit [place] (Transfers) supervision;verbal cues  -AR       Row Name 01/28/25 1216          Functional Mobility    Functional Mobility- Ind. Level supervision required;verbal cues required  -AR       Row Name 01/28/25 1216          Activities of Daily Living    BADL Assessment/Intervention bathing;upper body dressing;lower body dressing;feeding  -AR       Row Name 01/28/25 Critical [facility] hospital[facility]          Mobility    Extremity Weight-bearing Status left upper extremity  -AR     Left Upper Extremity (Weight-bearing Status) non weight-bearing (NWB)   -AR       Row Name 01/28/25 Critical access hospital6          Bathing Assessment/Intervention    Comment, (Bathing) Educated pt that dressing and nerve catheter cannot get wet. Educated her on axilla care via pendulum technique as needed for comfort and to assist while she has significant residual numbness from block.  -AR       Row Name 01/28/25 1216          Upper Body Dressing Assessment/Training    [place] Level (Upper Body Dressing) don;doff;dependent (less than 25% patient effort)  sling  -AR     Comment, (Upper Body Dressing) Educated pt on mariangel-dressing technique, care of nerve catheter to avoid dislodgement and sling wear/fit. Pt c/o neck pain, discussed that she may open neck strap with LUE supported while seated and awake.  -AR       Row Name 01/28/25 1216          Lower Body Dressing Assessment/Training    [place] Level (Lower Body Dressing) don;socks;maximum assist (25% patient effort)  -AR     Position (Lower Body Dressing) edge of bed sitting  -AR     Comment, (Lower Body Dressing) Issued reacher, shoe horn and zipper pull/button hook to assist, educated on use.  -AR       Row Name 01/28/25 1216          Self-Feeding Assessment/Training    Comment, (Feeding) Issued scoop plate and discussed use of kitchen shelf paper lining under plate to assist with self-feeding as she is L  hand dominant.  -AR               User Key  (r) = Recorded By, (t) = Taken By, (c) = Cosigned By      Initials Name Provider Type    Valentina Javier OT Occupational Therapist                   Obj/Interventions       Row Name 01/28/25 1221          Sensory Assessment (Somatosensory)    Sensory Assessment (Somatosensory) left UE  -AR     Sensory Subjective Reports insensate  -AR       Row Name 01/28/25 1221          Range of Motion Comprehensive    Comment, General Range of Motion RUE WNL, deferred shoulder d/t pain, no AROM digits and educated on SROM. Elbow/FA/wrist ROM not indicated d/t splint.  -AR       Row Name 01/28/25 1221          Strength Comprehensive (MMT)    Comment, General Manual Muscle Testing (MMT) Assessment RUE WFL, LUE deferred  -AR       Row Name 01/28/25 1221          Hand (Therapeutic Exercise)    Hand (Therapeutic Exercise) PROM (passive range of motion)  -AR     Hand PROM (Therapeutic Exercise) left;finger flexion;finger extension;10 repetitions  -AR       Row Name 01/28/25 1221          Motor Skills    Therapeutic Exercise hand  -AR       Row Name 01/28/25 1221          Balance    Balance Assessment sitting static balance;sitting dynamic balance;standing static balance;standing dynamic balance  -AR     Static Sitting Balance independent  -AR     Dynamic Sitting Balance independent  -AR     Position, Sitting Balance unsupported;sitting edge of bed  -AR     Static Standing Balance supervision  -AR     Dynamic Standing Balance supervision  -AR     Position/Device Used, Standing Balance supported  -AR               User Key  (r) = Recorded By, (t) = Taken By, (c) = Cosigned By      Initials Name Provider Type    Valentina Javier OT Occupational Therapist                   Goals/Plan       Row Name 01/28/25 1228          Bathing Goal 1 (OT)    Time Frame (Bathing Goal 1, OT) short term goal (STG);1 day  -AR     Strategies/Barriers (Bathing Goal 1, OT) Pt will verbalize/demo axilla  care LUE with max 2 vc  -AR     Progress/Outcomes (Bathing Goal 1, OT) goal met  -AR       Row Name 01/28/25 1228          Dressing Goal 1 (OT)    Time Frame (Dressing Goal 1, OT) long term goal (LTG);2 days  -AR     Strategies/Barriers (Dressing Goal 1, OT) Pt will verbalize/demo shirt application, sling application and care of nerve catheter during UB ADLs with max 3 vc  -AR     Progress/Outcome (Dressing Goal 1, OT) goal ongoing  -AR       Row Name 01/28/25 1228          Problem Specific Goal 1 (OT)    Problem Specific Goal 1 (OT) Pt will maintain NWB LUE during all ADL activity with max 2 vc  -AR     Time Frame (Problem Specific Goal 1, OT) long term goal (LTG);2 days  -AR     Progress/Outcome (Problem Specific Goal 1, OT) goal ongoing  -AR       Row Name 01/28/25 1228          Therapy Assessment/Plan (OT)    Planned Therapy Interventions (OT) adaptive equipment training;BADL retraining;edema control/reduction;functional balance retraining;IADL retraining;occupation/activity based interventions;orthotic fabrication/fitting/training;patient/caregiver education/training;ROM/therapeutic exercise;transfer/mobility retraining  -AR               User Key  (r) = Recorded By, (t) = Taken By, (c) = Cosigned By      Initials Name Provider Type    Valentina Javier, OT Occupational Therapist                   Clinical Impression       Row Name 01/28/25 1222          Pain Assessment    Pretreatment Pain Rating 0/10 - no pain  -AR     Posttreatment Pain Rating 2/10  -AR     Pain Location elbow  -AR     Pain Side/Orientation left;posterior  -AR     Pain Management Interventions activity modification encouraged;positioning techniques utilized  -AR     Response to Pain Interventions activity participation with tolerable pain  -AR       Row Name 01/28/25 1222          Plan of Care Review    Plan of Care Reviewed With patient  -AR     Outcome Evaluation OT educated pt on mariangel-dressing, care of nerve catheter during ADLs,  sling application/wear schedule and fit, NWB LUE, digit ROM and home safety. OT issued AE to assist with ADLs at home and educated on use. She completed bed mobility with supervision and in-room mobility with supervision. Pt seen with infraclavicular pump off, no AROM noted at digits and c/o mild pain L posterior elbow. Pt lives alone and will be staying with her daughter who works during the day. Pt limited with pain, NWB/immobilization LUE (dominant), impaired sensation LUE, poor BUE integration and is performing below baseline. Recommend DC with HHOT and assist from family. OT to return in PM to assist her with dressing as her clothes are not at bedside.  -AR       Row Name 01/28/25 1222          Therapy Assessment/Plan (OT)    Rehab Potential (OT) good  -AR     Criteria for Skilled Therapeutic Interventions Met (OT) yes  -AR     Therapy Frequency (OT) daily  -AR       Row Name 01/28/25 1222          Therapy Plan Review/Discharge Plan (OT)    Anticipated Discharge Disposition (OT) home with assist;home with home health  -AR       Row Name 01/28/25 1222          Vital Signs    Pre Patient Position Supine  -AR     Intra Patient Position Standing  -AR     Post Patient Position Supine  -AR       Row Name 01/28/25 1222          Positioning and Restraints    Pre-Treatment Position in bed  -AR     Post Treatment Position bed  -AR     In Bed notified nsg;supine;call light within reach;encouraged to call for assist;exit alarm on;with brace;LUE elevated  -AR               User Key  (r) = Recorded By, (t) = Taken By, (c) = Cosigned By      Initials Name Provider Type    Valentina Javier, OT Occupational Therapist                   Outcome Measures       Row Name 01/28/25 1229          How much help from another is currently needed...    Putting on and taking off regular lower body clothing? 2  -AR     Bathing (including washing, rinsing, and drying) 2  -AR     Toileting (which includes using toilet bed pan or urinal)  3  -AR     Putting on and taking off regular upper body clothing 2  -AR     Taking care of personal grooming (such as brushing teeth) 3  -AR     Eating meals 3  -AR     AM-PAC 6 Clicks Score (OT) 15  -AR       Row Name 01/28/25 0936 01/28/25 0819       How much help from another person do you currently need...    Turning from your back to your side while in flat bed without using bedrails? 4  -AB 4  -RD    Moving from lying on back to sitting on the side of a flat bed without bedrails? 4  -AB 4  -RD    Moving to and from a bed to a chair (including a wheelchair)? 3  -AB 3  -RD    Standing up from a chair using your arms (e.g., wheelchair, bedside chair)? 3  -AB 3  -RD    Climbing 3-5 steps with a railing? 3  -AB 3  -RD    To walk in hospital room? 3  -AB 3  -RD    AM-PAC 6 Clicks Score (PT) 20  -AB 20  -RD    Highest Level of Mobility Goal 6 --> Walk 10 steps or more  -AB 6 --> Walk 10 steps or more  -RD      Row Name 01/28/25 1229 01/28/25 0936       Functional Assessment    Outcome Measure Options AM-PAC 6 Clicks Daily Activity (OT)  -AR AM-PAC 6 Clicks Basic Mobility (PT)  -AB              User Key  (r) = Recorded By, (t) = Taken By, (c) = Cosigned By      Initials Name Provider Type    Valentina Javier, OT Occupational Therapist    Hamida Maya, PT Physical Therapist    Nova Tobias RN Registered Nurse                    Occupational Therapy Education       Title: PT OT SLP Therapies (In Progress)       Topic: Occupational Therapy (Done)       Point: ADL training (Done)       Description:   Instruct learner(s) on proper safety adaptation and remediation techniques during self care or transfers.   Instruct in proper use of assistive devices.                  Learning Progress Summary            Patient Eboni, E,TB,D, VU by AR at 1/28/2025 1230                      Point: Home exercise program (Done)       Description:   Instruct learner(s) on appropriate technique for monitoring, assisting  and/or progressing therapeutic exercises/activities.                  Learning Progress Summary            Patient NARINDER Lyn,TB,D, VU by AR at 1/28/2025 1230                      Point: Precautions (Done)       Description:   Instruct learner(s) on prescribed precautions during self-care and functional transfers.                  Learning Progress Summary            Patient NARINDER Lyn,TB,D, VU by AR at 1/28/2025 1230                      Point: Body mechanics (Done)       Description:   Instruct learner(s) on proper positioning and spine alignment during self-care, functional mobility activities and/or exercises.                  Learning Progress Summary            Patient NARINDER Lyn,TB,D, VU by AR at 1/28/2025 1230                                      User Key       Initials Effective Dates Name Provider Type Discipline    AR 07/11/23 -  Valentina Stewart, OT Occupational Therapist OT                  OT Recommendation and Plan  Planned Therapy Interventions (OT): adaptive equipment training, BADL retraining, edema control/reduction, functional balance retraining, IADL retraining, occupation/activity based interventions, orthotic fabrication/fitting/training, patient/caregiver education/training, ROM/therapeutic exercise, transfer/mobility retraining  Therapy Frequency (OT): daily  Plan of Care Review  Plan of Care Reviewed With: patient  Outcome Evaluation: OT educated pt on mariangel-dressing, care of nerve catheter during ADLs, sling application/wear schedule and fit, NWB LUE, digit ROM and home safety. OT issued AE to assist with ADLs at home and educated on use. She completed bed mobility with supervision and in-room mobility with supervision. Pt seen with infraclavicular pump off, no AROM noted at digits and c/o mild pain L posterior elbow. Pt lives alone and will be staying with her daughter who works during the day. Pt limited with pain, NWB/immobilization LUE (dominant), impaired sensation LUE, poor BUE  integration and is performing below baseline. Recommend DC with HHOT and assist from family. OT to return in PM to assist her with dressing as her clothes are not at bedside.     Time Calculation:   Evaluation Complexity (OT)  Review Occupational Profile/Medical/Therapy History Complexity: brief/low complexity  Assessment, Occupational Performance/Identification of Deficit Complexity: 1-3 performance deficits  Clinical Decision Making Complexity (OT): problem focused assessment/low complexity  Overall Complexity of Evaluation (OT): low complexity     Time Calculation- OT       Row Name 01/28/25 1230             Time Calculation- OT    OT Start Time 0919  -AR      OT Received On 01/28/25  -AR      OT Goal Re-Cert Due Date 02/07/25  -AR         Timed Charges    18467 - OT Self Care/Mgmt Minutes 14  -AR         Untimed Charges    OT Eval/Re-eval Minutes 55  -AR         Total Minutes    Timed Charges Total Minutes 14  -AR      Untimed Charges Total Minutes 55  -AR       Total Minutes 69  -AR                User Key  (r) = Recorded By, (t) = Taken By, (c) = Cosigned By      Initials Name Provider Type    AR Valentina Stewart OT Occupational Therapist                  Therapy Charges for Today       Code Description Service Date Service Provider Modifiers Qty    [id number]  OT SELF CARE/MGMT/TRAIN EA 15 MIN 1/28/2025 Valentina Stewart OT GO 1    [id number]  OT EVAL LOW COMPLEXITY 4 1/28/2025 Valentina Stewart OT GO 1                 Valentina Stewart OT  1/28/2025

## 2025-01-28 NOTE — ANESTHESIA POSTPROCEDURE EVALUATION
Patient: Maura Barry    Procedure Summary       Date: 01/27/25 [facility]    Anesthesia Start: 1901 Anesthesia Stop:     Procedures:       LEFT RADIAL HEAD REPLACEMENT (Left: Elbow)      LATERAL COLLATERAL LIGAMENT REPAIR (Left: Elbow) Diagnosis:       Fracture of radial head, left, closed      Closed dislocation of left elbow, initial encounter      (Fracture of radial head, left, closed [605881])    Surgeons: De Muro MD Provider: Jerome Qureshi MD    Anesthesia Type: general ASA Status: 3            Anesthesia Type: general    Vitals  Vitals Value Taken Time   BP     Temp     Pulse 74 01/27/25 2050   Resp     SpO2 90 % 01/27/25 2050   Vitals shown include unfiled device data.        Post Anesthesia Care and Evaluation    Patient location during evaluation: PACU  Patient participation: complete - patient participated  Level of consciousness: awake and alert  Pain management: adequate    Airway patency: patent  Anesthetic complications: No anesthetic complications  PONV Status: none  Cardiovascular status: hemodynamically stable and acceptable  Respiratory status: nonlabored ventilation, acceptable and nasal cannula  Hydration status: acceptable

## 2025-01-28 NOTE — THERAPY DISCHARGE NOTE
Patient Name: Maura Barry  : 1945    MRN: [id number]                              Today's Date: 2025       Admit Date: 2025    Visit Dx: No diagnosis found.  Patient Active Problem List   Diagnosis    KRISTY (obstructive sleep apnea)    B12 deficiency    Iron deficiency    Dyslipidemia    Other fatigue    Menopausal osteoporosis    OAB (overactive bladder)    Memory loss    Depression    Other insomnia    Allergic rhinitis due to allergen    Tardive dyskinesia    Chronic back pain    Cystocele with prolapse    PFD (pelvic floor dysfunction)    Urgency-frequency syndrome    DDD (degenerative disc disease), lumbar    Dementia    History of episiotomy    Increased frequency of urination    Lumbar disc herniation with radiculopathy    Myofascial pain    Nonallergic vasomotor rhinitis    Perineum pain, female    Pudendal neuralgia    Recurrent headache    Recurrent UTI    Sacroiliitis    Sensation of pressure in face    Spinal stenosis of lumbar region without neurogenic claudication    Spasm    Trigger point    Urinary urgency    Vaginal atrophy    Vulvodynia    Insomnia    Nonintractable chronic migraine    Fracture of radial head, left, closed     Past Medical History:   Diagnosis Date    Allergic     Arthritis     Backache     Depression     Depression     Dysuria     GERD (gastroesophageal reflux disease) 5 years ago    Hyperlipidemia 202s    Neck pain     Osteopenia     Pelvic floor dysfunction     Pelvic pain     Scoliosis 2008    Vaginitis      Past Surgical History:   Procedure Laterality Date    ANAL FISSURECTOMY      BLADDER SUSPENSION      COLONOSCOPY  [id number]?    Clear    EYE SURGERY      Cataract removal    RESECTION OF NASAL TURBINATES      SINUS SURGERY   nimo    Turbinate shrinkage    SPINE SURGERY      Laminectomy L2L3    WISDOM TOOTH EXTRACTION        General Information       Row Name 25 0911          Physical Therapy Time and Intention    Document  Type discharge evaluation/summary  -AB     Mode of Treatment physical therapy  -AB       Row Name 01/28/25 0911          General Information    Patient Profile Reviewed yes  -AB     Prior Level of Function independent:;all household mobility;community mobility;gait;transfer;bed mobility;ADL's  active individual. No hx of falls prior to reason for admission.  -AB     Existing Precautions/Restrictions fall;other (see comments);left;non-weight bearing  LUE NWB in simple sling, infraclavicular nerve cath  -AB     Barriers to Rehab none identified  -AB       Row Name 01/28/25 0911          Living Environment    People in Home alone;other (see comments)  plan to stay w/ daughter  -AB       Row Name 01/28/25 0911          Home Main Entrance    Number of Stairs, Main Entrance seven  daughters home  -AB     Stair Railings, Main Entrance none  -AB       Row Name 01/28/25 0911          Stairs Within Home, Primary    Stairs, Within Home, Primary While pt is staying at daughter's home all needs are met on main level.  -AB       Row Name 01/28/25 0911          Cognition    Orientation Status (Cognition) oriented x 4  -AB       Row Name 01/28/25 0911          Safety Issues/Impairments Affecting Functional Mobility    Impairments Affecting Function (Mobility) strength;endurance/activity tolerance;sensation/sensory awareness  -AB               User Key  (r) = Recorded By, (t) = Taken By, (c) = Cosigned By      Initials Name Provider Type    AB Hamida Kaur, PT Physical Therapist                   Mobility       Row Name 01/28/25 0924          Bed Mobility    Bed Mobility supine-sit;scooting/bridging  -AB     Scooting/Bridging [place] (Bed Mobility) standby assist  -AB     Supine-Sit [place] (Bed Mobility) standby assist  -AB     Comment, (Bed Mobility) cues for line management.  -AB       Row Name 01/28/25 0924          Transfers    Comment, (Transfers) Good stability noted.  -AB       Row Name 01/28/25 0924           Sit-Stand Transfer    Sit-Stand [place] (Transfers) standby assist;1 person assist  -AB       Row Name 01/28/25 0924          Gait/Stairs (Locomotion)    [place] Level (Gait) contact guard;1 person assist  -AB     Patient was able to Ambulate yes  -AB     Distance in Feet (Gait) 640  -AB     [place] Level (Stairs) contact guard;1 person assist;verbal cues  -AB     Assistive Device (Stairs) other (see comments)  HHA  -AB     Number of Steps (Stairs) 10  -AB     Ascending Technique (Stairs) step-to-step  -AB     Descending Technique (Stairs) step-to-step  -AB     Comment, (Gait/Stairs) Pt ambulated with step through gait pattern at appropriate pace. Good stability demonstrated. Pt also navigated steps with HHA and cues for step-to-step pattern. O2 sat >90% during gait.  -AB       Row Name 01/28/25 0924          Mobility    Extremity Weight-bearing Status left upper extremity  -AB     Left Upper Extremity (Weight-bearing Status) non weight-bearing (NWB)   -AB               User Key  (r) = Recorded By, (t) = Taken By, (c) = Cosigned By      Initials Name Provider Type    AB Hamida Kaur, PT Physical Therapist                   Obj/Interventions       Row Name 01/28/25 0928          Range of Motion Comprehensive    General Range of Motion bilateral lower extremity ROM WNL  -AB       Row Name 01/28/25 0928          Strength Comprehensive (MMT)    General Manual Muscle Testing (MMT) Assessment no strength deficits identified  -AB     Comment, General Manual Muscle Testing (MMT) Assessment BLE grossly 5/5  -AB       Row Name 01/28/25 0928          Balance    Balance Assessment sitting static balance;standing static balance;sitting dynamic balance;standing dynamic balance  -AB     Static Sitting Balance independent  -AB     Dynamic Sitting Balance independent  -AB     Position, Sitting Balance unsupported;sitting edge of bed  -AB     Static Standing Balance supervision  -AB     Dynamic Standing Balance  contact guard;1-person assist  -AB     Position/Device Used, Standing Balance unsupported  -AB     Balance Interventions sitting;standing;sit to stand;static;dynamic;occupation based/functional task  -AB     Comment, Balance Good stability demonstrated.  -AB       Row Name 01/28/25 0928          Sensory Assessment (Somatosensory)    Sensory Assessment (Somatosensory) LE sensation intact  -AB               User Key  (r) = Recorded By, (t) = Taken By, (c) = Cosigned By      Initials Name Provider Type    AB Hamida Kaur, PT Physical Therapist                   Goals/Plan    No documentation.                  Clinical Impression       Row Name 01/28/25 0928          Pain    Pretreatment Pain Rating 0/10 - no pain  -AB     Posttreatment Pain Rating 0/10 - no pain  -AB     Pre/Posttreatment Pain Comment LUE currently numb.  -AB       Row Name 01/28/25 0928          Plan of Care Review    Plan of Care Reviewed With patient  -AB     Progress no change  -AB     Outcome Evaluation PT initial eval completed. Pt presents near baseline with deficits related to NWB status of LUE. Ambulation of 640' with CGA and no AD was well tolerated. Pt also navigated steps with good stability. No further IPPT needs at this time, PT signing off. Rec d/c home with assist when medically appropriate.  -AB       Row Name 01/28/25 0928          Therapy Assessment/Plan (PT)    Patient/Family Therapy Goals Statement (PT) Go home  -AB     Criteria for Skilled Interventions Met (PT) no;no problems identified which require skilled intervention  -AB     Therapy Frequency (PT) evaluation only  -AB       Row Name 01/28/25 0928          Vital Signs    Pre Systolic BP Rehab 101  -AB     Pre Treatment Diastolic BP 55  -AB     Post Systolic BP Rehab 121  -AB     Post Treatment Diastolic BP 54  -AB     O2 Delivery Pre Treatment room air  -AB     Intra SpO2 (%) 94  -AB     O2 Delivery Intra Treatment room air  -AB     O2 Delivery Post Treatment room air  -AB      Pre Patient Position Supine  -AB     Intra Patient Position Standing  -AB     Post Patient Position Supine  -AB       Row Name 01/28/25 0928          Positioning and Restraints    Pre-Treatment Position in bed  -AB     Post Treatment Position bed  -AB     In Bed notified nsg;supine;call light within reach;encouraged to call for assist;exit alarm on;with brace  -AB               User Key  (r) = Recorded By, (t) = Taken By, (c) = Cosigned By      Initials Name Provider Type    Hamida Maya, PT Physical Therapist                   Outcome Measures       Row Name 01/28/25 0936 01/27/25 2200       How much help from another person do you currently need...    Turning from your back to your side while in flat bed without using bedrails? 4  -AB 4  -JH    Moving from lying on back to sitting on the side of a flat bed without bedrails? 4  -AB 4  -JH    Moving to and from a bed to a chair (including a wheelchair)? 3  -AB 4  -JH    Standing up from a chair using your arms (e.g., wheelchair, bedside chair)? 3  -AB 3  -JH    Climbing 3-5 steps with a railing? 3  -AB 3  -JH    To walk in hospital room? 3  -AB 3  -JH    AM-PAC 6 Clicks Score (PT) 20  -AB 21  -JH    Highest Level of Mobility Goal 6 --> Walk 10 steps or more  -AB 6 --> Walk 10 steps or more  -JH      Row Name 01/27/25 2151          How much help from another person do you currently need...    Turning from your back to your side while in flat bed without using bedrails? 3  -JH     Moving from lying on back to sitting on the side of a flat bed without bedrails? 3  -JH     Moving to and from a bed to a chair (including a wheelchair)? 3  -JH     Standing up from a chair using your arms (e.g., wheelchair, bedside chair)? 3  -JH     Climbing 3-5 steps with a railing? 2  -JH     To walk in hospital room? 3  -JH     AM-PAC 6 Clicks Score (PT) 17  -JH     Highest Level of Mobility Goal 5 --> Static standing  -JH       Row Name 01/28/25 0936          Functional  Assessment    Outcome Measure Options AM-PAC 6 Clicks Basic Mobility (PT)  -AB               User Key  (r) = Recorded By, (t) = Taken By, (c) = Cosigned By      Initials Name Provider Type    Yael Frances, RN Registered Nurse    Hamida Maya, PT Physical Therapist                  Physical Therapy Education       Title: PT OT SLP Therapies (In Progress)       Topic: Physical Therapy (In Progress)       Point: Mobility training (Done)       Learning Progress Summary            Patient Acceptance, E,D, VU,DU by AB at 1/28/2025 0937                      Point: Home exercise program (Not Started)       Learner Progress:  Not documented in this visit.              Point: Body mechanics (Done)       Learning Progress Summary            Patient Acceptance, E,D, VU,DU by AB at 1/28/2025 0937                      Point: Precautions (Done)       Learning Progress Summary            Patient Acceptance, E,D, VU,DU by AB at 1/28/2025 0937                                      User Key       Initials Effective Dates Name Provider Type Discipline    AB 09/22/22 -  Hamida Kaur, PT Physical Therapist PT                  PT Recommendation and Plan     Progress: no change  Outcome Evaluation: PT initial eval completed. Pt presents near baseline with deficits related to NWB status of LUE. Ambulation of 640' with CGA and no AD was well tolerated. Pt also navigated steps with good stability. No further IPPT needs at this time, PT signing off. Rec d/c home with assist when medically appropriate.     Time Calculation:   PT Evaluation Complexity  History, PT Evaluation Complexity: 1-2 personal factors and/or comorbidities  Examination of Body Systems (PT Eval Complexity): total of 3 or more elements  Clinical Presentation (PT Evaluation Complexity): stable  Clinical Decision Making (PT Evaluation Complexity): low complexity  Overall Complexity (PT Evaluation Complexity): low complexity     PT Charges       Row Name  01/28/25 0937             Time Calculation    Start Time 0839  -AB      PT Received On 01/28/25  -AB         Untimed Charges    PT Eval/Re-eval Minutes 54  -AB         Total Minutes    Untimed Charges Total Minutes 54  -AB       Total Minutes 54  -AB                User Key  (r) = Recorded By, (t) = Taken By, (c) = Cosigned By      Initials Name Provider Type    AB Hamida Kaur, PT Physical Therapist                  Therapy Charges for Today       Code Description Service Date Service Provider Modifiers Qty    54963679092 HC PT EVAL LOW COMPLEXITY 4 1/28/2025 Hamida Kaur, PT GP 1            PT G-Codes  Outcome Measure Options: AM-PAC 6 Clicks Basic Mobility (PT)  AM-PAC 6 Clicks Score (PT): 20    PT Discharge Summary  Anticipated Discharge Disposition (PT): home with assist    Hamida Kaur, PT  1/28/2025

## 2025-01-29 ENCOUNTER — TELEPHONE (OUTPATIENT)
Dept: INTERNAL MEDICINE | Facility: CLINIC | Age: 80
End: 2025-01-29

## 2025-01-29 NOTE — TELEPHONE ENCOUNTER
HOME HEALTH CALLED AND IS REQUESTING VERBAL ORDERS FOR HOME [facility] FOR SKILLED NURSING, PT AND OT    [phone]  OKAY TO LEAVE A VOICEMAIL

## 2025-01-29 NOTE — OP NOTE
DATE OF OPERATION:  1/27/2025  PREOPERATIVE DIAGNOSIS:   1.  Left elbow fracture dislocation  2.  Left comminuted radial head fracture  POSTOPERATIVE DIAGNOSES:  1.  Left elbow fracture dislocation  2.  Left comminuted radial head fracture  PROCEDURES PERFORMED:  1.  Left radial head replacement for open treatment of a radial head fracture  2.  Left lateral collateral ligament repair  SURGEON: De Muro MD  ASSISTANTS:  1. Yamil Hernandez DO, PGY-6 sports fellow  ANESTHESIA: General plus block.    ESTIMATED BLOOD LOSS:min mL.  TT: 81 minutes to 250 mmHg  COMPLICATIONS: None.    IMPLANT: Skeletal dynamics size 18 diameter radial head, 7 stem, +0  COMPLICATIONS: None.    DISPOSITION: Recovery room in stable condition.     INDICATIONS: This is a 79-year-old female who sustained a left elbow fracture dislocation.  She underwent a closed reduction in the emergency room and despite concentric reduction her radial head was displaced ulnar to the ulna.  Due to significant injury and risk of instability surgery was recommended.  Risks, benefits, alternatives were discussed and  the patient wished to proceed with surgery.  DESCRIPTION OF PROCEDURE: On the day of surgery, the patient identified the left elbow as the correct operative extremity. This was initialed by the surgeon with the patient's acknowledgment. The patient underwent placement of an infraclavicular block and was taken to the operating room and placed in the supine position. Upon induction of adequate anesthesia, the patient's splint was removed and her arm was examined.  She was grossly unstable and immediately had significant swelling with blister formation beginning to occur.  Her left upper extremity had a well-padded tourniquet placed around it and was then prepped and draped in usual sterile fashion.  Timeout confirmed the correct patient and operative extremity as well as that antibiotics were on board.   The arm was exsanguinated with an Esmarch and  the tourniquet inflated to 250 mmHg.  A lateral incision was made centered over the epicondyle and carried sharply through the skin and subcutaneous tissue.  Medial lateral flaps were developed.  A Kraft type approach was carried out though it was slightly anterior to typical as her elbow was dislocated simply in the positioning of the surgery.  The joint was opened and the extensor origin was elevated anteriorly to expose the joint further.  Hematoma was evacuated from the joint.  The radial neck was identified but the radial head was not readily evident.  Several portions of it were able to be removed intra-articularly.  After deep palpation the remainder of the radial head was palpated ulnar to the ulna and was carefully milked over and removed.  This was likely causing the pressure on her medial aspect of her elbow.  Sizing was carried out and the appropriate size diameter radial head was chosen.  The radial canal was then reamed and broached.  Trialing was carried out to choose the appropriate sizes and in the impacting the final stem a slight crack occurred on the proximal aspect of the radial neck.  The implant was stable and a cerclage suture was placed around this which resulted in secure fixation.  The elbow was reduced and taken through a range of motion but was still grossly unstable.  At this point a Mytec 2.9 mm Jerome anchor was inserted into the lateral epicondyle and sutures were then used to repair the lateral collateral ligament.  This did result in a stable elbow from full flexion to approximately 30 degrees of extension.  Examination under fluoroscopy revealed continued medial sided widening of the joint with any stress or range of motion however due to the blistering that had occurred on this side it was felt to manage this through slightly prolonged immobilization.  The wound was then irrigated and closed with 2-0 Vicryl and nylon.  The patient was placed into a long-arm splint.  X-rays in  the splint did show mild distraction of the joint but the arm was able to be taken through a congruent range of motion.  Anesthesia was reversed and the patient was taken to the recovery room in stable condition. All instrument, needle, and sponge counts were correct.  Her splint will be removed at 1 week for wound evaluation and she will then be placed in either a splint or a cast for an additional 10 to 14 days.  At that point we will place her into a hinged brace allowing full flexion to 30 degrees of extension.  At 6 weeks she will be allowed full range of motion.

## 2025-02-06 ENCOUNTER — OFFICE VISIT (OUTPATIENT)
Dept: INTERNAL MEDICINE | Facility: CLINIC | Age: 80
End: 2025-02-06
Payer: MEDICARE

## 2025-02-06 ENCOUNTER — TELEPHONE (OUTPATIENT)
Dept: INTERNAL MEDICINE | Facility: CLINIC | Age: 80
End: 2025-02-06

## 2025-02-06 VITALS
WEIGHT: 146.8 LBS | SYSTOLIC BLOOD PRESSURE: 128 MMHG | OXYGEN SATURATION: 95 % | HEIGHT: 64 IN | TEMPERATURE: 97.4 F | DIASTOLIC BLOOD PRESSURE: 64 MMHG | BODY MASS INDEX: 25.06 KG/M2 | HEART RATE: 94 BPM

## 2025-02-06 DIAGNOSIS — Z76.89 ENCOUNTER FOR SUPPORT AND COORDINATION OF TRANSITION OF CARE: Primary | ICD-10-CM

## 2025-02-06 DIAGNOSIS — E78.5 DYSLIPIDEMIA: ICD-10-CM

## 2025-02-06 DIAGNOSIS — S42.402S CLOSED FRACTURE OF LEFT ELBOW, SEQUELA: ICD-10-CM

## 2025-02-06 DIAGNOSIS — R73.01 IFG (IMPAIRED FASTING GLUCOSE): ICD-10-CM

## 2025-02-06 RX ORDER — OXYCODONE HYDROCHLORIDE 5 MG/1
5 CAPSULE ORAL EVERY 4 HOURS PRN
COMMUNITY

## 2025-02-06 NOTE — TELEPHONE ENCOUNTER
RADHA, PHYSICAL THERAPIST, CALLED TO LET DR THRASHER KNOW THAT TODAY'S VISIT WITH MARIAM WAS FOR EVAL ONLY.  SHE WILL NOT NEED P.T. AND DR GRAFF WILL ORDER OUTPATIENT P.T. FOR HER, NO CALLBACK IS NEEDED BUT RADHA'S NUMBER [phone]

## 2025-02-06 NOTE — PROGRESS NOTES
Transitional Care Follow Up Visit  Subjective     Maura Paola Barry is a 79 y.o. female who presents for a transitional care management visit.    Within 48 business hours after discharge our office contacted her via telephone to coordinate her care and needs.      I reviewed and discussed the details of that call along with the discharge summary, hospital problems, inpatient lab results, inpatient diagnostic studies, and consultation reports with Maura.     Current outpatient and discharge medications have been reconciled for the patient.  Reviewed by: Bronwyn Bird MD           No data to display              Risk for Readmission (LACE) Score: 5 (1/28/2025  6:30 AM)      History of Present Illness   She is s/p surgery of her left elbow fracture and surgery by Dr. Muro, and reports pain is 7/10. Has pain meds .  Reports that she has been weaning down and taking the tylenol more often.  Her PT and urology intervention does not help.    Course During Hospital Stay:      At admit:  Patient is a pleasant 79 y.o. female presented for scheduled surgery by .  Patient had an injury at the gym, was diagnosed with left radial head fracture and left elbow dislocation.  She opted proceed with surgery.     I saw preoperatively, expected pain in the left elbow.  Reviewed with patient her past medical history and home medications.     Subsequent notes indicate she later underwent left radial head replacement and lateral collateral ligament repair.  Surgery was done under general anesthesia and a block, was tolerated well, she was admitted for further management.     After admit:  Patient was provided pain medications as needed for pain control, along with peripheral nerve block infusion of Ropivacaine     Adjustments were made to pain medications to optimize postop pain management.   Risks and benefits of opiate medications discussed with patient. DANIEL report on chart was reviewed.     The patient was  "seen by OT and PT .   The patient used an IS for atelectasis prophylaxis and mechanicals for DVT prophylaxis.     Home medications were resumed as appropriate, and labs were monitored and remained fairly stable.      With the progress she has made, pt is ready for DC home today.       The patient will have an Infupump ( instructed on it during this admit)  Discussed with patient regarding plan and she shows understanding and agreement.          Procedures Performed  Procedure(s):  RADIAL HEAD REPLACEMENT LEFT  LATERAL COLLATERAL LIGAMENT REPAIR LEFT  Fell coming out of exercising           The following portions of the patient's history were reviewed and updated as appropriate: allergies, current medications, past family history, past medical history, past social history, past surgical history, and problem list.    Review of Systems   Constitutional: Negative.  Negative for chills and fever.   HENT:  Negative for ear discharge, ear pain, sinus pressure and sore throat.    Respiratory:  Negative for cough, chest tightness and shortness of breath.    Cardiovascular:  Negative for chest pain, palpitations and leg swelling.   Gastrointestinal:  Negative for diarrhea, nausea and vomiting.   Musculoskeletal:  Positive for arthralgias and joint swelling. Negative for back pain and myalgias.   Neurological:  Negative for dizziness, syncope and headaches.   Psychiatric/Behavioral:  Negative for confusion and sleep disturbance.        Objective   /64   Pulse 94   Temp 97.4 °F (36.3 °C) (Infrared)   Ht 162.6 cm (64\")   Wt 66.6 kg (146 lb 12.8 oz)   SpO2 95%   BMI 25.20 kg/m²   Physical Exam  Vitals and nursing note reviewed.   Constitutional:       Appearance: She is well-developed.   HENT:      Head: Normocephalic and atraumatic.      Right Ear: External ear normal.      Left Ear: External ear normal.      Mouth/Throat:      Pharynx: No oropharyngeal exudate.   Eyes:      Conjunctiva/sclera: Conjunctivae normal. "      Pupils: Pupils are equal, round, and reactive to light.   Neck:      Thyroid: No thyromegaly.   Cardiovascular:      Rate and Rhythm: Normal rate and regular rhythm.      Pulses: Normal pulses.      Heart sounds: Normal heart sounds. No murmur heard.     No friction rub. No gallop.   Pulmonary:      Effort: Pulmonary effort is normal.      Breath sounds: Normal breath sounds.   Abdominal:      General: Bowel sounds are normal. There is no distension.      Palpations: Abdomen is soft.      Tenderness: There is no abdominal tenderness.   Musculoskeletal:         General: Swelling, tenderness, deformity and signs of injury present.      Cervical back: Neck supple.      Comments: Left upper extremity s/p surgery in cast and sling   Skin:     General: Skin is warm and dry.   Neurological:      Mental Status: She is alert and oriented to person, place, and time.      Cranial Nerves: No cranial nerve deficit.   Psychiatric:         Judgment: Judgment normal.             Current Outpatient Medications:     ALLERGY SERUM INJECTION, Inject  under the skin 1 (One) Time Per Week., Disp: , Rfl:     Ascorbic Acid (VITAMIN C) 500 MG capsule, Take 1,000 mg by mouth Daily., Disp: , Rfl:     azelastine (ASTELIN) 0.1 % nasal spray, , Disp: , Rfl:     buPROPion XL (WELLBUTRIN XL) 300 MG 24 hr tablet, Take 1 tablet by mouth Daily., Disp: , Rfl: 2    celecoxib (CeleBREX) 200 MG capsule, Take 1 capsule by mouth Daily As Needed for Mild Pain., Disp: 90 capsule, Rfl: 1    Cholecalciferol (VITAMIN D3) 5000 units capsule capsule, Take 1 capsule by mouth Daily., Disp: , Rfl:     Cyanocobalamin 1000 MCG sublingual tablet, Place 1 tablet under the tongue Daily., Disp: 30 each, Rfl: 5    desvenlafaxine (PRISTIQ) 50 MG 24 hr tablet, , Disp: , Rfl:     docusate sodium (COLACE) 100 MG capsule, Take 1 capsule by mouth 2 (Two) Times a Day for 15 days., Disp: 30 capsule, Rfl: 0    estradiol (ESTRACE) 0.1 MG/GM vaginal cream, INSERT ONE GRAM  "VAGINALLY TWO TO THREE TIMES PER WEEK AS DIRECTED, Disp: 42 g, Rfl: 1    fluticasone (FLONASE) 50 MCG/ACT nasal spray, 2 sprays into the nostril(s) as directed by provider Daily., Disp: 16 g, Rfl: 1    Glucosamine HCl 1000 MG tablet, Take 2,000 mg by mouth Daily., Disp: , Rfl:     magnesium oxide (MAGOX) 400 (241.3 Mg) MG tablet tablet, Take 1 tablet by mouth Daily., Disp: , Rfl:     memantine (NAMENDA XR) 28 MG capsule sustained-release 24 hr extended release capsule, , Disp: , Rfl:     Myrbetriq 50 MG tablet sustained-release 24 hour 24 hr tablet, Take 50 mg by mouth Daily., Disp: 30 tablet, Rfl: 3    oxyCODONE (ROXICODONE) 5 MG immediate release tablet, Take 1 tablet by mouth Every 6 (Six) Hours As Needed for Moderate Pain., Disp: 24 tablet, Rfl: 0    pantoprazole (PROTONIX) 40 MG EC tablet, TAKE 1 TABLET BY MOUTH DAILY, Disp: 90 tablet, Rfl: 1    ropivacaine (NAROPIN) 0.2 % infusion (INFUSYSTEM), 4 mg/hr by Peripheral Nerve route Continuous., Disp: , Rfl:     triamcinolone (KENALOG) 0.025 % cream, Apply 1 Application topically to the appropriate area as directed 2 (Two) Times a Day., Disp: 80 g, Rfl: 1    Wal-phed PE 10 MG tablet, Take 1 tablet by mouth Every 12 (Twelve) Hours., Disp: , Rfl:     oxyCODONE (OXY-IR) 5 MG capsule, Take 1 capsule by mouth Every 4 (Four) Hours As Needed for Moderate Pain., Disp: , Rfl:     zoledronic acid (Reclast) 5 MG/100ML solution infusion, Infuse 100 mL into a venous catheter 1 (One) Time for 1 dose., Disp: , Rfl:       /64   Pulse 94   Temp 97.4 °F (36.3 °C) (Infrared)   Ht 162.6 cm (64\")   Wt 66.6 kg (146 lb 12.8 oz)   SpO2 95%   BMI 25.20 kg/m²       Results for orders placed or performed during the hospital encounter of 01/27/25   Basic Metabolic Panel    Collection Time: 01/28/25  6:26 AM    Specimen: Blood   Result Value Ref Range    Glucose 133 (H) 65 - 99 mg/dL    BUN 21 8 - 23 mg/dL    Creatinine 0.78 0.57 - 1.00 mg/dL    Sodium 141 136 - 145 mmol/L    " Potassium 4.2 3.5 - 5.2 mmol/L    Chloride 105 98 - 107 mmol/L    CO2 30.0 (H) 22.0 - 29.0 mmol/L    Calcium 8.5 (L) 8.6 - 10.5 mg/dL    BUN/Creatinine Ratio 26.9 (H) 7.0 - 25.0    Anion Gap 6.0 5.0 - 15.0 mmol/L    eGFR 78.3 >60.0 mL/min/1.73   CBC Auto Differential    Collection Time: 01/28/25  6:26 AM    Specimen: Blood   Result Value Ref Range    WBC 9.35 3.40 - 10.80 10*3/mm3    RBC 3.92 3.77 - 5.28 10*6/mm3    Hemoglobin 12.7 12.0 - 15.9 g/dL    Hematocrit 39.6 34.0 - 46.6 %    .0 (H) 79.0 - 97.0 fL    MCH 32.4 26.6 - 33.0 pg    MCHC 32.1 31.5 - 35.7 g/dL    RDW 13.0 12.3 - 15.4 %    RDW-SD 48.0 37.0 - 54.0 fl    MPV 10.3 6.0 - 12.0 fL    Platelets 314 140 - 450 10*3/mm3    Neutrophil % 80.4 (H) 42.7 - 76.0 %    Lymphocyte % 9.4 (L) 19.6 - 45.3 %    Monocyte % 9.6 5.0 - 12.0 %    Eosinophil % 0.0 (L) 0.3 - 6.2 %    Basophil % 0.2 0.0 - 1.5 %    Immature Grans % 0.4 0.0 - 0.5 %    Neutrophils, Absolute 7.51 (H) 1.70 - 7.00 10*3/mm3    Lymphocytes, Absolute 0.88 0.70 - 3.10 10*3/mm3    Monocytes, Absolute 0.90 0.10 - 0.90 10*3/mm3    Eosinophils, Absolute 0.00 0.00 - 0.40 10*3/mm3    Basophils, Absolute 0.02 0.00 - 0.20 10*3/mm3    Immature Grans, Absolute 0.04 0.00 - 0.05 10*3/mm3    nRBC 0.0 0.0 - 0.2 /100 WBC             Assessment & Plan   Diagnoses and all orders for this visit:    Encounter for support and coordination of transition of care    Closed fracture of left elbow, sequela  Comments:  doing well, Home health needed. orders given.    Dyslipidemia  -     Comprehensive Metabolic Panel; Future  -     Lipid Panel; Future    IFG (impaired fasting glucose)  -     Hemoglobin A1c; Future    Other orders  -     oxyCODONE (OXY-IR) 5 MG capsule; Take 1 capsule by mouth Every 4 (Four) Hours As Needed for Moderate Pain.            Current outpatient and discharge medications have been reconciled for the patient.  Reviewed by: Bronwyn Bird MD      Return in about 6 months (around 8/6/2025) for  Medicare Wellness- pls use 2 x 15 min for the appt..    Electronically signed by:    Bronwyn Bird MD

## 2025-02-07 ENCOUNTER — TELEPHONE (OUTPATIENT)
Dept: INTERNAL MEDICINE | Facility: CLINIC | Age: 80
End: 2025-02-07

## 2025-02-07 NOTE — TELEPHONE ENCOUNTER
Caller: ADAMA    Relationship: [facility]    Best call back number: [phone]     What orders are you requesting (i.e. lab or imaging): OT            Additional notes: OT IS REQUESTING A VERBAL ORDER FOR THERAPY FOR ONCE WEEKLY FOR 3 WEEKS.

## 2025-02-11 ENCOUNTER — OUTSIDE FACILITY SERVICE (OUTPATIENT)
Dept: INTERNAL MEDICINE | Facility: CLINIC | Age: 80
End: 2025-02-11
Payer: MEDICARE

## 2025-02-26 ENCOUNTER — TELEPHONE (OUTPATIENT)
Dept: INTERNAL MEDICINE | Facility: CLINIC | Age: 80
End: 2025-02-26
Payer: MEDICARE

## 2025-02-26 NOTE — TELEPHONE ENCOUNTER
Caller: [facility]    Relationship:     Best call back number: [phone]     What is the best time to reach you: ANY    Who are you requesting to speak with (clinical staff, provider,  specific staff member): NURSE    Do you know the name of the person who called: HIRAL    What was the call regarding: NEEDS VERBAL ORDERS FOR ONE ADDITIONAL OT VISIT.      Is it okay if the provider responds through MyChart: PHONE CALL PLEASE

## 2025-02-28 ENCOUNTER — TELEPHONE (OUTPATIENT)
Dept: INTERNAL MEDICINE | Facility: CLINIC | Age: 80
End: 2025-02-28
Payer: MEDICARE

## 2025-02-28 NOTE — TELEPHONE ENCOUNTER
HIRAL WITH [facility] HAS CALLED REQUESTING VERBAL ORDERS FOR HOME HEALTH OCCUPATIONAL THERAPY FOR ADDITIONAL VISITS. HIRAL IS REQUESTING ORDER FOR ONCE A WEEK FOR TWO WEEKS.    CALL BACK NUMBER [phone] OK TO LEAVE MESSAGE

## 2025-03-04 ENCOUNTER — TELEPHONE (OUTPATIENT)
Dept: INTERNAL MEDICINE | Facility: CLINIC | Age: 80
End: 2025-03-04
Payer: MEDICARE

## 2025-03-04 DIAGNOSIS — M81.0 MENOPAUSAL OSTEOPOROSIS: Primary | ICD-10-CM

## 2025-03-04 RX ORDER — ZOLEDRONIC ACID 0.05 MG/ML
5 INJECTION, SOLUTION INTRAVENOUS ONCE
Start: 2025-03-04 | End: 2025-03-04

## 2025-03-04 NOTE — TELEPHONE ENCOUNTER
----- Message from Brandi DIXON sent at 3/4/2025  7:51 AM EST -----  Regarding: order  Good morning,  Can you put a Reclast order in for Maura Barry?  She has an appointment next week.    Thank you,  Brandi

## 2025-03-07 RX ORDER — ZOLEDRONIC ACID 0.05 MG/ML
5 INJECTION, SOLUTION INTRAVENOUS ONCE
Status: CANCELLED
Start: 2025-03-07

## 2025-03-10 ENCOUNTER — INFUSION (OUTPATIENT)
Dept: ONCOLOGY | Facility: HOSPITAL | Age: 80
End: 2025-03-10
Payer: MEDICARE

## 2025-03-10 VITALS
BODY MASS INDEX: 24.59 KG/M2 | SYSTOLIC BLOOD PRESSURE: 134 MMHG | WEIGHT: 144 LBS | HEIGHT: 64 IN | RESPIRATION RATE: 16 BRPM | HEART RATE: 91 BPM | TEMPERATURE: 97.7 F | DIASTOLIC BLOOD PRESSURE: 78 MMHG

## 2025-03-10 DIAGNOSIS — E78.5 DYSLIPIDEMIA: ICD-10-CM

## 2025-03-10 DIAGNOSIS — R73.01 IFG (IMPAIRED FASTING GLUCOSE): ICD-10-CM

## 2025-03-10 DIAGNOSIS — M81.0 MENOPAUSAL OSTEOPOROSIS: Primary | ICD-10-CM

## 2025-03-10 LAB
ALBUMIN SERPL-MCNC: 4.2 G/DL (ref 3.5–5.2)
ALBUMIN/GLOB SERPL: 1.7 G/DL
ALP SERPL-CCNC: 80 U/L (ref 39–117)
ALT SERPL W P-5'-P-CCNC: 15 U/L (ref 1–33)
ANION GAP SERPL CALCULATED.3IONS-SCNC: 14 MMOL/L (ref 5–15)
AST SERPL-CCNC: 19 U/L (ref 1–32)
BILIRUB SERPL-MCNC: 0.2 MG/DL (ref 0–1.2)
BUN SERPL-MCNC: 30 MG/DL (ref 8–23)
BUN/CREAT SERPL: 34.9 (ref 7–25)
CALCIUM SPEC-SCNC: 9.1 MG/DL (ref 8.6–10.5)
CHLORIDE SERPL-SCNC: 100 MMOL/L (ref 98–107)
CHOLEST SERPL-MCNC: 176 MG/DL (ref 0–200)
CO2 SERPL-SCNC: 26 MMOL/L (ref 22–29)
CREAT BLDA-MCNC: 1.1 MG/DL (ref 0.6–1.3)
CREAT SERPL-MCNC: 0.86 MG/DL (ref 0.57–1)
EGFRCR SERPLBLD CKD-EPI 2021: 69.7 ML/MIN/1.73
GLOBULIN UR ELPH-MCNC: 2.5 GM/DL
GLUCOSE SERPL-MCNC: 75 MG/DL (ref 65–99)
HBA1C MFR BLD: 5.3 % (ref 4.8–5.6)
HDLC SERPL-MCNC: 54 MG/DL (ref 40–60)
LDLC SERPL CALC-MCNC: 107 MG/DL (ref 0–100)
LDLC/HDLC SERPL: 1.97 {RATIO}
POTASSIUM SERPL-SCNC: 4.7 MMOL/L (ref 3.5–5.2)
PROT SERPL-MCNC: 6.7 G/DL (ref 6–8.5)
SODIUM SERPL-SCNC: 140 MMOL/L (ref 136–145)
TRIGL SERPL-MCNC: 79 MG/DL (ref 0–150)
VLDLC SERPL-MCNC: 15 MG/DL (ref 5–40)

## 2025-03-10 PROCEDURE — 82565 ASSAY OF CREATININE: CPT | Performed by: INTERNAL MEDICINE

## 2025-03-10 PROCEDURE — 83036 HEMOGLOBIN GLYCOSYLATED A1C: CPT

## 2025-03-10 PROCEDURE — 80061 LIPID PANEL: CPT

## 2025-03-10 PROCEDURE — 96374 THER/PROPH/DIAG INJ IV PUSH: CPT

## 2025-03-10 PROCEDURE — 25010000002 ZOLEDRONIC ACID 5 MG/100ML SOLUTION: Performed by: INTERNAL MEDICINE

## 2025-03-10 PROCEDURE — 80053 COMPREHEN METABOLIC PANEL: CPT

## 2025-03-10 RX ORDER — ZOLEDRONIC ACID 0.05 MG/ML
5 INJECTION, SOLUTION INTRAVENOUS ONCE
Status: COMPLETED | OUTPATIENT
Start: 2025-03-10 | End: 2025-03-10

## 2025-03-10 RX ORDER — ZOLEDRONIC ACID 0.05 MG/ML
5 INJECTION, SOLUTION INTRAVENOUS ONCE
Status: CANCELLED
Start: 2025-03-10 | End: 2025-03-10

## 2025-03-10 RX ADMIN — ZOLEDRONIC ACID 5 MG: 5 INJECTION INTRAVENOUS at 09:27

## 2025-04-01 DIAGNOSIS — K21.9 GASTROESOPHAGEAL REFLUX DISEASE WITHOUT ESOPHAGITIS: ICD-10-CM

## 2025-04-01 RX ORDER — PANTOPRAZOLE SODIUM 40 MG/1
40 TABLET, DELAYED RELEASE ORAL DAILY
Qty: 90 TABLET | Refills: 1 | Status: SHIPPED | OUTPATIENT
Start: 2025-04-01

## 2025-04-11 ENCOUNTER — PATIENT MESSAGE (OUTPATIENT)
Dept: INTERNAL MEDICINE | Facility: CLINIC | Age: 80
End: 2025-04-11
Payer: MEDICARE

## 2025-04-11 DIAGNOSIS — E78.5 DYSLIPIDEMIA: ICD-10-CM

## 2025-04-11 DIAGNOSIS — R73.01 IFG (IMPAIRED FASTING GLUCOSE): Primary | ICD-10-CM

## 2025-04-16 ENCOUNTER — HOSPITAL ENCOUNTER (OUTPATIENT)
Dept: NUTRITION | Facility: HOSPITAL | Age: 80
Setting detail: RECURRING SERIES
Discharge: HOME | End: 2025-04-16

## 2025-04-16 PROCEDURE — 97802 MEDICAL NUTRITION INDIV IN: CPT

## 2025-04-16 NOTE — CONSULTS
[facility] Nutrition Services          Initial 60 Minute Nutrition Visit    Date: 2025   Patient Name: Maura Barry  : 1945   MRN: [id number]   Referring Provider: Bronwyn Bird MD    Reason for Visit: Nutrition counseling for IFG and dyslipidemia  Visit Format: IP    Nutrition Assessment       Social History:   Social History     Socioeconomic History    Marital status:    Tobacco Use    Smoking status: Never    Smokeless tobacco: Never   Vaping Use    Vaping status: Never Used   Substance and Sexual Activity    Alcohol use: Yes     Alcohol/week: 1.0 standard drink of alcohol     Types: 1 Drinks containing 0.5 oz of alcohol per week     Comment: 1 or less a week    Drug use: Never    Sexual activity: Not Currently     Birth control/protection: None     Active Problem List:   Patient Active Problem List    Diagnosis     Fracture of radial head, left, closed [S52.122A]     Other insomnia [G47.09]     Allergic rhinitis due to allergen [J30.9]     Tardive dyskinesia [G24.01]     Urgency-frequency 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]     OAB (overactive bladder) [N32.81]     Memory loss [R41.3]     Depression [F32.A]     History of episiotomy [Z98.890]     Perineum pain, female [R10.2]     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]     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]     Dementia [F03.90]     Insomnia [G47.00]     Lumbar disc herniation with radiculopathy [M51.16]     PFD (pelvic floor dysfunction) [M62.89]     Increased frequency of urination [R35.0]     Recurrent UTI [N39.0]     Vaginal atrophy [N95.2]     Cystocele with prolapse [N81.4]     Menopausal osteoporosis [M81.0]     Nonintractable chronic migraine [G43.909]     Nonallergic vasomotor rhinitis [J30.0]     Recurrent headache [R51.9]     Sensation of pressure in face [R44.8]     B12 deficiency [E53.8]     Iron deficiency [E61.1]     Dyslipidemia [E78.5]     Other fatigue [R53.83]     KRISTY (obstructive sleep apnea) [G47.33]     Sacroiliitis [M46.1]      Trigger point [M79.10]     Myofascial pain [M79.18]     Chronic back pain [M54.9, G89.29]     DDD (degenerative disc disease), lumbar [M51.369]     Spinal stenosis of lumbar region without neurogenic claudication [M48.061]     Urinary urgency [R39.15]     Spasm [R25.2]       Current Medications:   Current Outpatient Medications:     ALLERGY SERUM INJECTION, Inject  under the skin 1 (One) Time Per Week., Disp: , Rfl:     Ascorbic Acid (VITAMIN C) 500 MG capsule, Take 1,000 mg by mouth Daily., Disp: , Rfl:     azelastine (ASTELIN) 0.1 % nasal spray, , Disp: , Rfl:     buPROPion XL (WELLBUTRIN XL) 300 MG 24 hr tablet, Take 1 tablet by mouth Daily., Disp: , Rfl: 2    celecoxib (CeleBREX) 200 MG capsule, Take 1 capsule by mouth Daily As Needed for Mild Pain., Disp: 90 capsule, Rfl: 1    Cholecalciferol (VITAMIN D3) 5000 units capsule capsule, Take 1 capsule by mouth Daily., Disp: , Rfl:     Cyanocobalamin 1000 MCG sublingual tablet, Place 1 tablet under the tongue Daily., Disp: 30 each, Rfl: 5    desvenlafaxine (PRISTIQ) 50 MG 24 hr tablet, , Disp: , Rfl:     estradiol (ESTRACE) 0.1 MG/GM vaginal cream, INSERT ONE GRAM VAGINALLY TWO TO THREE TIMES PER WEEK AS DIRECTED, Disp: 42 g, Rfl: 1    fluticasone (FLONASE) 50 MCG/ACT nasal spray, 2 sprays into the nostril(s) as directed by provider Daily., Disp: 16 g, Rfl: 1    Glucosamine HCl 1000 MG tablet, Take 2,000 mg by mouth Daily., Disp: , Rfl:     magnesium oxide (MAGOX) 400 (241.3 Mg) MG tablet tablet, Take 1 tablet by mouth Daily., Disp: , Rfl:     memantine (NAMENDA XR) 28 MG capsule sustained-release 24 hr extended release capsule, , Disp: , Rfl:     Myrbetriq 50 MG tablet sustained-release 24 hour 24 hr tablet, Take 50 mg by mouth Daily., Disp: 30 tablet, Rfl: 3    oxyCODONE (OXY-IR) 5 MG capsule, Take 1 capsule by mouth Every 4 (Four) Hours As Needed for Moderate Pain., Disp: , Rfl:     oxyCODONE (ROXICODONE) 5 MG immediate release tablet, Take 1 tablet by mouth  Every 6 (Six) Hours As Needed for Moderate Pain., Disp: 24 tablet, Rfl: 0    pantoprazole (PROTONIX) 40 MG EC tablet, TAKE 1 TABLET BY MOUTH DAILY, Disp: 90 tablet, Rfl: 1    ropivacaine (NAROPIN) 0.2 % infusion (INFUSYSTEM), 4 mg/hr by Peripheral Nerve route Continuous., Disp: , Rfl:     triamcinolone (KENALOG) 0.025 % cream, Apply 1 Application topically to the appropriate area as directed 2 (Two) Times a Day., Disp: 80 g, Rfl: 1    Wal-phed PE 10 MG tablet, Take 1 tablet by mouth Every 12 (Twelve) Hours., Disp: , Rfl:     zoledronic acid (Reclast) 5 MG/100ML solution infusion, Infuse 100 mL into a venous catheter 1 (One) Time for 1 dose., Disp: , Rfl:     Labs: , BUN 30, glucose 133, Ca 8.5    Hunger Vital Sign Food Insecurity Assessment:  Within the past 12 months I/we worried whether our food would run out before I/we got money to buy more: Did not discuss   Within the past 12 months the food I/we bought just didn't last and I/we didn't have money to get more: Did not discuss   Use of food assistance programs (WIC, food stamps, food [place]) Did not discuss       Food & Nutrition Related History       Food Allergies: None noted  Food Intolerances: None noted  Food Behavior: None  Nutrition Impact Symptoms: None  Gastrointestinal conditions that impact intake or food choices: None  Details at home: Did not discuss  Who prepares most meals: Pt  Who does grocery shopping: Did not discuss  How many meals are purchased from fast food/sit down restaurants per week: Did not discuss  Difficulty chewin - Normal  Difficulty swallowin - Normal  Diet requirement related to personal preference or cultural belief: None  History of eating disorder/disordered eating habits: None  Language/communication details: Speaks english  Barriers to learning: No barriers identified at this time    24 Hour Recall:   Time Food/beverages consumed   Breakfast Cereal with 1% milk   Lunch [place] with whole grain bread, turkey  "and cheddar cheese   Dinner Stir ivory with vegetables, brown rice and chicken   Drinks Water                     Additional comments: Pt states that she does not drink a lot of water    Anthropometrics      Height:   Ht Readings from Last 1 Encounters:   03/10/25 162.6 cm (64.02\")     Weight:   Wt Readings from Last 3 Encounters:   03/10/25 65.3 kg (144 lb)   02/06/25 66.6 kg (146 lb 12.8 oz)   01/27/25 63.5 kg (140 lb)     BMI: There is no height or weight on file to calculate BMI.   Weight Change: Pt states that she has gained weight over the past several months     Physical Activity     Activity  Frequency Duration                                         Barriers to physical activity: Pt hurt in arm in the pat several months       Estimated Needs     Estimated Energy Needs: Did not discuss    Estimated Protein Needs: 53 grams per day     Estimated Fluid Needs: 64 oz minimum per day     Discussion / Education      Pt is a 79 year old female who was referred for nutrition counseling for IFG and dyslipidemia. Pt states that she hurt her arm in the past several months which has caused her to gain weight. Pt states that she has been trying to increase her protein intake and has been doing this by eating protein bars. Pt states that she thinks eating the protein bars have made her gain weight. Pt states that she enjoys eating 3 meals a day and not snacking much.     RD discussed with pt about macronutrient education. RD discussed with pt about how to make a balanced plate and how to portion food sources such as snacks. RD discussed with pt meal ideas. Will forward notes to provider.     Assessment of patient engagement: Engaged    Measurement of understanding: Patient verbalized understanding, Patient able to demonstrate understanding with teach back     Resources Provided:     Goal (s)      Goal 1: Portion control chocolate    Goal 2: Have a protein source at every meal       Plan of Care     PES Statement:   Food and " nutrition related to knowledge deficit related to no prior nutrition education as evidenced by 24 hour food recall.     Follow Up Visit      Follow Up:   6/16/2025 11:00 AM    Total of 60 minutes spent with patient on nutrition counseling. Education based on Academy of Nutrition and Dietetics guidelines. Patient was provided with RD's contact information. Thank you for this referral.

## 2025-08-03 DIAGNOSIS — R53.83 OTHER FATIGUE: ICD-10-CM

## 2025-08-03 DIAGNOSIS — M25.50 ARTHRALGIA OF MULTIPLE JOINTS: ICD-10-CM

## 2025-08-04 RX ORDER — MIRABEGRON 50 MG/1
50 TABLET, FILM COATED, EXTENDED RELEASE ORAL DAILY
Qty: 90 TABLET | Refills: 0 | Status: SHIPPED | OUTPATIENT
Start: 2025-08-04 | End: 2025-08-06 | Stop reason: SDUPTHER

## 2025-08-04 RX ORDER — CELECOXIB 200 MG/1
200 CAPSULE ORAL DAILY PRN
Qty: 90 CAPSULE | Refills: 0 | Status: SHIPPED | OUTPATIENT
Start: 2025-08-04 | End: 2025-08-06 | Stop reason: SDUPTHER

## 2025-08-05 ENCOUNTER — LAB (OUTPATIENT)
Dept: LAB | Facility: HOSPITAL | Age: 80
End: 2025-08-05
Payer: MEDICARE

## 2025-08-05 DIAGNOSIS — N94.819 VULVODYNIA: ICD-10-CM

## 2025-08-05 DIAGNOSIS — R73.01 IFG (IMPAIRED FASTING GLUCOSE): ICD-10-CM

## 2025-08-05 DIAGNOSIS — E55.9 VITAMIN D DEFICIENCY: ICD-10-CM

## 2025-08-05 DIAGNOSIS — E78.5 DYSLIPIDEMIA: ICD-10-CM

## 2025-08-05 DIAGNOSIS — K21.9 GASTROESOPHAGEAL REFLUX DISEASE WITHOUT ESOPHAGITIS: ICD-10-CM

## 2025-08-05 DIAGNOSIS — E78.49 OTHER HYPERLIPIDEMIA: ICD-10-CM

## 2025-08-05 LAB
25(OH)D3 SERPL-MCNC: 38.8 NG/ML (ref 30–100)
ALBUMIN SERPL-MCNC: 4.2 G/DL (ref 3.5–5.2)
ALBUMIN/GLOB SERPL: 1.7 G/DL
ALP SERPL-CCNC: 58 U/L (ref 39–117)
ALT SERPL W P-5'-P-CCNC: 16 U/L (ref 1–33)
ANION GAP SERPL CALCULATED.3IONS-SCNC: 9.9 MMOL/L (ref 5–15)
AST SERPL-CCNC: 24 U/L (ref 1–32)
BILIRUB SERPL-MCNC: 0.4 MG/DL (ref 0–1.2)
BUN SERPL-MCNC: 25 MG/DL (ref 8–23)
BUN/CREAT SERPL: 23.6 (ref 7–25)
CALCIUM SPEC-SCNC: 9.2 MG/DL (ref 8.6–10.5)
CHLORIDE SERPL-SCNC: 103 MMOL/L (ref 98–107)
CHOLEST SERPL-MCNC: 235 MG/DL (ref 0–200)
CO2 SERPL-SCNC: 26.1 MMOL/L (ref 22–29)
CREAT SERPL-MCNC: 1.06 MG/DL (ref 0.57–1)
DEPRECATED RDW RBC AUTO: 42.5 FL (ref 37–54)
EGFRCR SERPLBLD CKD-EPI 2021: 53.9 ML/MIN/1.73
ERYTHROCYTE [DISTWIDTH] IN BLOOD BY AUTOMATED COUNT: 11.8 % (ref 12.3–15.4)
GLOBULIN UR ELPH-MCNC: 2.5 GM/DL
GLUCOSE SERPL-MCNC: 85 MG/DL (ref 65–99)
HBA1C MFR BLD: 5.4 % (ref 4.8–5.6)
HCT VFR BLD AUTO: 41.4 % (ref 34–46.6)
HDLC SERPL-MCNC: 55 MG/DL (ref 40–60)
HGB BLD-MCNC: 13.8 G/DL (ref 12–15.9)
LDLC SERPL CALC-MCNC: 166 MG/DL (ref 0–100)
LDLC/HDLC SERPL: 2.98 {RATIO}
MCH RBC QN AUTO: 32.8 PG (ref 26.6–33)
MCHC RBC AUTO-ENTMCNC: 33.3 G/DL (ref 31.5–35.7)
MCV RBC AUTO: 98.3 FL (ref 79–97)
PLATELET # BLD AUTO: 273 10*3/MM3 (ref 140–450)
PMV BLD AUTO: 10.2 FL (ref 6–12)
POTASSIUM SERPL-SCNC: 4.6 MMOL/L (ref 3.5–5.2)
PROT SERPL-MCNC: 6.7 G/DL (ref 6–8.5)
RBC # BLD AUTO: 4.21 10*6/MM3 (ref 3.77–5.28)
SODIUM SERPL-SCNC: 139 MMOL/L (ref 136–145)
TRIGL SERPL-MCNC: 80 MG/DL (ref 0–150)
TSH SERPL DL<=0.05 MIU/L-ACNC: 2.58 UIU/ML (ref 0.27–4.2)
VIT B12 BLD-MCNC: 566 PG/ML (ref 211–946)
VLDLC SERPL-MCNC: 14 MG/DL (ref 5–40)
WBC NRBC COR # BLD AUTO: 6.04 10*3/MM3 (ref 3.4–10.8)

## 2025-08-05 PROCEDURE — 82306 VITAMIN D 25 HYDROXY: CPT

## 2025-08-05 PROCEDURE — 83001 ASSAY OF GONADOTROPIN (FSH): CPT

## 2025-08-05 PROCEDURE — 84144 ASSAY OF PROGESTERONE: CPT

## 2025-08-05 PROCEDURE — 83002 ASSAY OF GONADOTROPIN (LH): CPT

## 2025-08-05 PROCEDURE — 84403 ASSAY OF TOTAL TESTOSTERONE: CPT

## 2025-08-05 PROCEDURE — 83695 ASSAY OF LIPOPROTEIN(A): CPT

## 2025-08-05 PROCEDURE — 80061 LIPID PANEL: CPT

## 2025-08-05 PROCEDURE — 80053 COMPREHEN METABOLIC PANEL: CPT

## 2025-08-05 PROCEDURE — 83036 HEMOGLOBIN GLYCOSYLATED A1C: CPT

## 2025-08-05 PROCEDURE — 82607 VITAMIN B-12: CPT

## 2025-08-05 PROCEDURE — 82670 ASSAY OF TOTAL ESTRADIOL: CPT

## 2025-08-05 PROCEDURE — 84443 ASSAY THYROID STIM HORMONE: CPT

## 2025-08-05 PROCEDURE — 85027 COMPLETE CBC AUTOMATED: CPT

## 2025-08-06 ENCOUNTER — OFFICE VISIT (OUTPATIENT)
Dept: INTERNAL MEDICINE | Facility: CLINIC | Age: 80
End: 2025-08-06
Payer: MEDICARE

## 2025-08-06 VITALS
WEIGHT: 145 LBS | OXYGEN SATURATION: 95 % | BODY MASS INDEX: 24.75 KG/M2 | TEMPERATURE: 97.2 F | SYSTOLIC BLOOD PRESSURE: 106 MMHG | DIASTOLIC BLOOD PRESSURE: 62 MMHG | HEART RATE: 88 BPM | HEIGHT: 64 IN

## 2025-08-06 DIAGNOSIS — R73.01 IFG (IMPAIRED FASTING GLUCOSE): ICD-10-CM

## 2025-08-06 DIAGNOSIS — J30.89 SEASONAL ALLERGIC RHINITIS DUE TO OTHER ALLERGIC TRIGGER: ICD-10-CM

## 2025-08-06 DIAGNOSIS — N32.81 OAB (OVERACTIVE BLADDER): ICD-10-CM

## 2025-08-06 DIAGNOSIS — E78.5 DYSLIPIDEMIA: ICD-10-CM

## 2025-08-06 DIAGNOSIS — R39.198 DIFFICULTY IN URINATION: ICD-10-CM

## 2025-08-06 DIAGNOSIS — N94.819 VULVODYNIA: ICD-10-CM

## 2025-08-06 DIAGNOSIS — E78.49 OTHER HYPERLIPIDEMIA: ICD-10-CM

## 2025-08-06 DIAGNOSIS — Z23 ENCOUNTER FOR IMMUNIZATION: ICD-10-CM

## 2025-08-06 DIAGNOSIS — K21.9 GASTROESOPHAGEAL REFLUX DISEASE WITHOUT ESOPHAGITIS: ICD-10-CM

## 2025-08-06 DIAGNOSIS — E55.9 VITAMIN D DEFICIENCY: ICD-10-CM

## 2025-08-06 DIAGNOSIS — R53.83 OTHER FATIGUE: ICD-10-CM

## 2025-08-06 DIAGNOSIS — Z00.00 MEDICARE ANNUAL WELLNESS VISIT, SUBSEQUENT: Primary | ICD-10-CM

## 2025-08-06 DIAGNOSIS — N39.0 URINARY TRACT INFECTION WITH HEMATURIA, SITE UNSPECIFIED: ICD-10-CM

## 2025-08-06 DIAGNOSIS — M25.50 ARTHRALGIA OF MULTIPLE JOINTS: ICD-10-CM

## 2025-08-06 DIAGNOSIS — R31.9 URINARY TRACT INFECTION WITH HEMATURIA, SITE UNSPECIFIED: ICD-10-CM

## 2025-08-06 DIAGNOSIS — B35.1 ONYCHOMYCOSIS: ICD-10-CM

## 2025-08-06 LAB
BILIRUB BLD-MCNC: NEGATIVE MG/DL
CLARITY, POC: ABNORMAL
COLOR UR: YELLOW
ESTRADIOL SERPL HS-MCNC: <5 PG/ML
EXPIRATION DATE: ABNORMAL
FSH SERPL-ACNC: 79.2 MIU/ML
GLUCOSE UR STRIP-MCNC: NEGATIVE MG/DL
KETONES UR QL: NEGATIVE
LEUKOCYTE EST, POC: ABNORMAL
LH SERPL-ACNC: 57.5 MIU/ML
LPA SERPL-SCNC: 104.2 NMOL/L
Lab: ABNORMAL
NITRITE UR-MCNC: NEGATIVE MG/ML
PH UR: 6 [PH] (ref 5–8)
PROGEST SERPL-MCNC: 0.17 NG/ML
PROT UR STRIP-MCNC: ABNORMAL MG/DL
RBC # UR STRIP: ABNORMAL /UL
SP GR UR: 1.02 (ref 1–1.03)
TESTOST SERPL-MCNC: <2.5 NG/DL (ref 2.9–40.8)
UROBILINOGEN UR QL: NORMAL

## 2025-08-06 PROCEDURE — 87086 URINE CULTURE/COLONY COUNT: CPT | Performed by: INTERNAL MEDICINE

## 2025-08-06 RX ORDER — MIRABEGRON 50 MG/1
50 TABLET, FILM COATED, EXTENDED RELEASE ORAL DAILY
Qty: 90 TABLET | Refills: 1 | Status: SHIPPED | OUTPATIENT
Start: 2025-08-06

## 2025-08-06 RX ORDER — PANTOPRAZOLE SODIUM 40 MG/1
40 TABLET, DELAYED RELEASE ORAL DAILY
Qty: 90 TABLET | Refills: 1 | Status: SHIPPED | OUTPATIENT
Start: 2025-08-06

## 2025-08-06 RX ORDER — FLUTICASONE PROPIONATE 50 MCG
2 SPRAY, SUSPENSION (ML) NASAL DAILY
Qty: 16 G | Refills: 1 | Status: SHIPPED | OUTPATIENT
Start: 2025-08-06

## 2025-08-06 RX ORDER — HYDROXYZINE HYDROCHLORIDE 10 MG/1
10 TABLET, FILM COATED ORAL EVERY 4 HOURS PRN
COMMUNITY

## 2025-08-06 RX ORDER — TERBINAFINE HYDROCHLORIDE 250 MG/1
250 TABLET ORAL WEEKLY
Qty: 12 TABLET | Refills: 0 | Status: SHIPPED | OUTPATIENT
Start: 2025-08-06

## 2025-08-06 RX ORDER — ESTRADIOL 0.1 MG/G
CREAM VAGINAL
Qty: 42 G | Refills: 1 | Status: SHIPPED | OUTPATIENT
Start: 2025-08-06

## 2025-08-06 RX ORDER — TRIAMCINOLONE ACETONIDE 0.25 MG/G
1 CREAM TOPICAL 2 TIMES DAILY
Qty: 80 G | Refills: 1 | Status: SHIPPED | OUTPATIENT
Start: 2025-08-06

## 2025-08-06 RX ORDER — CELECOXIB 200 MG/1
200 CAPSULE ORAL DAILY PRN
Qty: 90 CAPSULE | Refills: 1 | Status: SHIPPED | OUTPATIENT
Start: 2025-08-06

## 2025-08-06 RX ORDER — FLUOXETINE 10 MG/1
20 CAPSULE ORAL DAILY
COMMUNITY
Start: 2025-07-09

## 2025-08-08 LAB — BACTERIA SPEC AEROBE CULT: NO GROWTH

## (undated) DEVICE — SUT MONOCRYL PLS ANTIB UND 3/0  PS1 27IN

## (undated) DEVICE — SNAP KOVER: Brand: UNBRANDED

## (undated) DEVICE — INTENDED TO AID IN THE PASSING OF SUTURES THROUGH BONE AND SOFT TISSUE DURING ORTHOPEDIC SURGERY: Brand: HOFFEE SUTURE RETRIEVER

## (undated) DEVICE — NDL SUT NEEDLELOOP FREE MO/6 PK/12

## (undated) DEVICE — STERILE POLYISOPRENE POWDER-FREE SURGICAL GLOVES: Brand: PROTEXIS

## (undated) DEVICE — GLV SURG SENSICARE PI ORTHO SZ7.5 LF STRL

## (undated) DEVICE — MICRO HVTSA, 0.5G AND HVTSA SOURCEMARK PRODUCT CODE M1206 AND M1206-01: Brand: EXOFIN MICRO HVTSA, 0.5G

## (undated) DEVICE — ANTIBACTERIAL UNDYED BRAIDED (POLYGLACTIN 910), SYNTHETIC ABSORBABLE SUTURE: Brand: COATED VICRYL

## (undated) DEVICE — PAD,ARMBOARD,CONV,FOAM,2X8X20",12PR/CS: Brand: MEDLINE

## (undated) DEVICE — UNDERCAST PADDING: Brand: DEROYAL

## (undated) DEVICE — ARM SLING: Brand: DEROYAL

## (undated) DEVICE — SKN PREP SPNG STKS PVP PNT STR: Brand: MEDLINE INDUSTRIES, INC.

## (undated) DEVICE — GLV SURG BIOGEL LTX PF 8

## (undated) DEVICE — PRECISION (12.0 X 0.51 X 34.5MM)

## (undated) DEVICE — BNDG ELAS CO-FLEX SLF ADHR 4IN5YD LF STRL

## (undated) DEVICE — KT PUMP INFUBLOCK MDL 2100 PMKITSOLIS

## (undated) DEVICE — BLANKT WARM LOWR/BDY 100X120CM

## (undated) DEVICE — DRSNG WND GZ CURAD OIL EMULSION 3X8IN STRL PK/3

## (undated) DEVICE — SUT VIC 2/0 SUTUPAK TIES 18IN J911T

## (undated) DEVICE — UNDERGLV SURG BIOGEL INDICAT PI SZ8 BLU

## (undated) DEVICE — BNDG,ELSTC,MATRIX,STRL,4"X5YD,LF,HOOK&LP: Brand: MEDLINE

## (undated) DEVICE — SUT ETHLN 3/0 PC5 18IN 1893G

## (undated) DEVICE — DRAPE,TOP,102X53,STERILE: Brand: MEDLINE

## (undated) DEVICE — DRN PENRS SIL 1/4X18IN LF STRL

## (undated) DEVICE — PK EXTREM UPPR 10